# Patient Record
Sex: MALE | Race: WHITE | NOT HISPANIC OR LATINO | Employment: FULL TIME | URBAN - METROPOLITAN AREA
[De-identification: names, ages, dates, MRNs, and addresses within clinical notes are randomized per-mention and may not be internally consistent; named-entity substitution may affect disease eponyms.]

---

## 2017-04-20 DIAGNOSIS — M10.9 ACUTE GOUTY ARTHRITIS: ICD-10-CM

## 2017-04-20 NOTE — TELEPHONE ENCOUNTER
Pt is calling and states that he is having a gout flare up in his left foot and asking if he can get med's over the phone, he did set up appt for tomorrow with clifford, but looking to get something today, please triage.     Karen Cronin, Station

## 2017-04-20 NOTE — TELEPHONE ENCOUNTER
Patient would like medication today still .   Sairasting request to SAUMYA Delaney as patient is requesting another provider look at the request .     Once ordered please call patient on [phone] or with any other questions.     Marah Carmona [facility]

## 2017-04-20 NOTE — TELEPHONE ENCOUNTER
Routing refill request to provider for review/approval because:[employer]  Drug not on the [facility] refill protocol or controlled substance  GABRIEL Henson  Clinic  RN/Joel Canales

## 2017-04-21 ENCOUNTER — TELEPHONE (OUTPATIENT)
Dept: LAB | Facility: CLINIC | Age: 52
End: 2017-04-21

## 2017-04-21 DIAGNOSIS — M1A.9XX0 CHRONIC GOUT WITHOUT TOPHUS, UNSPECIFIED CAUSE, UNSPECIFIED SITE: Primary | ICD-10-CM

## 2017-04-21 RX ORDER — HYDROCODONE BITARTRATE AND ACETAMINOPHEN 5; 325 MG/1; MG/1
1 TABLET ORAL EVERY 6 HOURS PRN
Qty: 25 TABLET | Refills: 0 | Status: CANCELLED | OUTPATIENT
Start: 2017-04-21

## 2017-04-21 NOTE — TELEPHONE ENCOUNTER
Appointment with Dr. Urena canceled. Now has appointment with Dr. Stringer 4/26/17.    Rama Donato CMA

## 2017-04-21 NOTE — PROGRESS NOTES
SUBJECTIVE:     CC: Favio Jacinto is an 51 year old male who presents for preventative health visit.     Healthy Habits:    Do you get at least three servings of calcium containing foods daily (dairy, green leafy vegetables, etc.)? yes    Amount of exercise or daily activities, outside of work: 2 day(s) per week    Problems taking medications regularly not applicable    Medication side effects: No    Have you had an eye exam in the past two years? no    Do you see a dentist twice per year? yes    Do you have sleep apnea, excessive snoring or daytime drowsiness?no    - Brother passed away 2 weeks ago, he had liver cancer. I had seen his brother in clinic. Reviewed the course of his illness, discussed grieving and coping with loss.    - Check spot on lip. Patient chews tobacco. He notes a white spot on the mid lower lip, left side that comes and goes, typically monthly. He will scrape off the lesion, it turns dark, then seems to resolve. No bleeding.    - Gout flare up. History of gout. Reviewed preventative treatment of gout using allopurinol, this is been deferred by the patient in the past.    -  Requesting PSA drawn today.    -  Patient says that he has numbness on both feet on the outside toes. The patient thinks it is neuropathy. He has seen podiatry in the past.    - History of pulmonary nodules, last CT was done 3 years ago. Denies any hemoptysis, chronic cough.    - History of a bicuspid aortic valve, an incidental finding on echocardiogram. Denies any symptoms consistent with heart failure.    - I reviewed stress testing for ischemic heart disease. His CT angiographic showed no evidence of calcification, 0%. Stress echocardiogram showed no evidence of ischemic heart disease. Advised that these tests were excellent and or further tests chest stress test be done.          Today's PHQ-2 Score:   PHQ-2 ( 1999 Pfizer) 4/26/2017 10/31/2016   Q1: Little interest or pleasure in doing things 1 0   Q2: Feeling  down, depressed or hopeless 0 0   PHQ-2 Score 1 0       Abuse: Current or Past(Physical, Sexual or Emotional)- No  Do you feel safe in your environment - Yes    Social History   Substance Use Topics     Smoking status: Former Smoker     Types: Dip, chew, snus or snuff     Quit date: 1/1/2007     Smokeless tobacco: Former User     Types: Chew     Alcohol use Yes      Comment: occasional     The patient does not drink >3 drinks per day nor >7 drinks per week.    Last PSA: No results found for: PSA    Recent Labs   Lab Test  02/15/13   1108  04/13/10   0615   CHOL  222*  183   HDL  64  57   LDL  139*  96   TRIG  96  148   CHOLHDLRATIO  3.0  3.0       Reviewed orders with patient. Reviewed health maintenance and updated orders accordingly - Yes    Reviewed and updated as needed this visit by clinical staff  Tobacco  Allergies  Meds  Med Hx  Surg Hx  Fam Hx  Soc Hx        Reviewed and updated as needed this visit by Provider            ROS:  C: NEGATIVE for fever, chills, change in weight  I: NEGATIVE for worrisome rashes, moles or lesions  E: NEGATIVE for vision changes or irritation  ENT: NEGATIVE for ear, mouth and throat problems  R: NEGATIVE for significant cough or SOB  CV: NEGATIVE for chest pain, palpitations or peripheral edema  GI: NEGATIVE for nausea, abdominal pain, heartburn, or change in bowel habits   male: NEGATIVE for dysuria, hematuria, decreased urinary stream, erectile dysfunction, urethral discharge  M: POSITIVE for gout in bilateral feet. NEGATIVE for other significant arthralgias or myalgia  N: POSITIVE for lateral feet numbness. NEGATIVE for other weakness, dizziness or paresthesias  P: NEGATIVE for changes in mood or affect    This document serves as a record of the services and decisions personally performed and made by Leah Stringer MD. It was created on his behalf by Andrea Garcia, a trained medical scribe. The creation of this document is based the provider's statements to the  medical scribe.  Andrea Garcia 8:02 AM April 26, 2017    Problem list, Medication list, Allergies, and Medical/Social/Surgical histories reviewed in [facility] and updated as appropriate.  OBJECTIVE:     BP 90/70  Pulse 68  Temp 98.4  F (36.9  C) (Tympanic)  Ht 6' (1.829 m)  Wt 185 lb 4 oz (84 kg)  BMI 25.12 kg/m2     EXAM:  GENERAL: healthy, alert and no distress  EYES: Eyes grossly normal to inspection, conjunctivae and sclerae normal  HENT: ear canals and TM's normal, nose and mouth without ulcers or lesions  RESP: lungs clear to auscultation - no rales, rhonchi or wheezes  CV: regular rate and rhythm, normal S1 S2, no murmur  ABDOMEN: soft, nontender  MS: no gross musculoskeletal defects noted, no edema  NEURO: Normal strength and tone, mentation intact and speech normal  PSYCH: mentation appears normal, affect normal/bright      Results for orders placed or performed in visit on 04/26/17   Hemoglobin A1c   Result Value Ref Range    Hemoglobin A1C 5.2 4.3 - 6.0 %       ASSESSMENT/PLAN:       (Z00.00) Routine general medical examination at a health care facility  (primary encounter diagnosis)  Comment: Reviewed lifestyle, current conditions, medications, routine screenings, labs.  Plan: Annual physical in 1 year, sooner for other health maintenance.     (Z13.6) CARDIOVASCULAR SCREENING; LDL GOAL LESS THAN 160  Comment: Routine check. Lab results pending.   Plan: CANCELED: Lipid Profile with reflex to direct         LDL          (R73.09) Elevated glucose  Comment: A1c is 5.2 today. Patient is not on any medications.   Plan: Hemoglobin A1c            (Z12.5) Screening for prostate cancer  Comment: Per patient request, will screen today.  Plan: PSA, screen    (Z11.59) Need for hepatitis C screening test  Comment: Routine screen based off age.   Plan: **Hepatitis C Screen Reflex to RNA FUTURE         Anytime      (Q23.1) Bicuspid aortic valve  Comment: Discussed best way to manage bicuspid aortic valve. I told the  patient the best way would be to get ECG.   Plan: Echocardiogram Complete      (M1A.9XX0) Chronic gout without tophus, unspecified cause, unspecified site  Comment: Recent flare up. Will check uric acid levels today. Lab results pending. Discussed starting preventative daily medication. Will prescribe preventative medication and medication to treat current symptoms.   Plan: Uric Acid, allopurinol (ZYLOPRIM) 300 MG tablet,         indomethacin (INDOCIN) 25 MG capsule    (M79.671,  M79.672) Pain in both feet  Comment: Likely secondary to gout. Patient is also experiencing numbness in feet. Refer to podiatry.   Plan: PODIATRY/FOOT & ANKLE SURGERY REFERRAL            (R91.8) Pulmonary nodules  Comment: Repeat CT to ensure stability.  Plan: CT Chest w/o Contrast       Await test results     Addendum: CT negative for any change in pulmonary nodules. No further imaging needed.    Patient Instructions     Preventive Health Recommendations  Male Ages 50 - 64     *    Will check cholesterol, kidney function, PSA, uric acid for gout, blood sugar.     *    For gout, consider taking a daily medication to prevent gout: allopurinol. I'll send a prescription.     *    For the foot pain and numbness, see Dr. Hutson. [phone].    *    Call about setting up a echocardiogram. [phone].     *     For the spot on your lip, when it occurs, come in for biopsy.     *      For the chest, call about setting up a repeat CT scan: [phone].     *     Your heart scan from 2014 was perfect, no signs of blockage.         COUNSELING:  Reviewed preventive health counseling, as reflected in patient instructions       Regular exercise       Healthy diet/nutrition       Consider Hep C screening for patients born between 1945 and 1965       Prostate cancer screening         reports that he quit smoking about 10 years ago. His smoking use included Dip, chew, snus or snuff. He has quit using smokeless tobacco. His smokeless tobacco use  included Chew.  Tobacco Cessation Action Plan: Self help information given to patient  Estimated body mass index is 25.12 kg/(m^2) as calculated from the following:    Height as of this encounter: 6' (1.829 m).    Weight as of this encounter: 185 lb 4 oz (84 kg).       Counseling Resources:  ATP IV Guidelines  Pooled Cohorts Equation Calculator  FRAX Risk Assessment  ICSI Preventive Guidelines  Dietary Guidelines for Americans, 2010  USDA's MyPlate  ASA Prophylaxis  Lung CA Screening    The information in this document, created by a scribe for me, accurately reflects the services I personally performed and the decisions made by me. I have reviewed and approved this document for accuracy. 8:01 AM 4/26/2017    Leah Stringer MD  [facility]

## 2017-04-21 NOTE — PATIENT INSTRUCTIONS
Preventive Health Recommendations  Male Ages 50   64     *    Will check cholesterol, kidney function, PSA, uric acid for gout, blood sugar.     *    For gout, consider taking a daily medication to prevent gout: allopurinol. I'll send a prescription.     *    For the foot pain and numbness, see Dr. Hutson. [phone].    *    Call about setting up a echocardiogram. [phone].     *     For the spot on your lip, when it occurs, come in for biopsy.     *      For the chest, call about setting up a repeat CT scan: [phone].     *     Your heart scan from 2014 was perfect, no signs of blockage.       Yearly exam:             See your health care provider every year in order to  o   Review health changes.   o   Discuss preventive care.    o   Review your medicines if your doctor has prescribed any.     Have a cholesterol test every 5 years, or more frequently if you are at risk for high cholesterol/heart disease.     Have a diabetes test (fasting glucose) every three years. If you are at risk for diabetes, you should have this test more often.     Have a colonoscopy at age 50, or have a yearly FIT test (stool test). These exams will check for colon cancer.      Talk with your health care provider about whether or not a prostate cancer screening test (PSA) is right for you.    You should be tested each year for STDs (sexually transmitted diseases), if you re at risk.     Shots: Get a flu shot each year. Get a tetanus shot every 10 years.     Nutrition:    Eat at least 5 servings of fruits and vegetables daily.     Eat whole-grain bread, whole-wheat pasta and brown rice instead of white grains and rice.     Talk to your provider about Calcium and Vitamin D.     Lifestyle    Exercise for at least 150 minutes a week (30 minutes a day, 5 days a week). This will help you control your weight and prevent disease.     Limit alcohol to one drink per day.     No smoking.     Wear sunscreen to prevent skin cancer.      See your dentist every six months for an exam and cleaning.     See your eye doctor every 1 to 2 years.

## 2017-04-21 NOTE — TELEPHONE ENCOUNTER
Patient coming in for lab work on Wednesday, 04/26/17.         Patient will be fasting.             Please place future orders. Thanks

## 2017-04-21 NOTE — TELEPHONE ENCOUNTER
Spoke with patient regarding medication requests.  Patient states that steroids do not work for him and he would like the indomethacin and norco combination that Dr. Stringer always prescribes for him. He gets a gout flare once every year or once every 2 years and this is the only thing that works for him.  Patient would like to keep appointment as scheduled for 1:40 today with Dr. Urena.    Cece Martinez RN

## 2017-04-26 ENCOUNTER — OFFICE VISIT (OUTPATIENT)
Dept: FAMILY MEDICINE | Facility: CLINIC | Age: 52
End: 2017-04-26
Payer: COMMERCIAL

## 2017-04-26 VITALS
HEART RATE: 68 BPM | TEMPERATURE: 98.4 F | HEIGHT: 72 IN | DIASTOLIC BLOOD PRESSURE: 70 MMHG | SYSTOLIC BLOOD PRESSURE: 90 MMHG | WEIGHT: 185.25 LBS | BODY MASS INDEX: 25.09 KG/M2

## 2017-04-26 DIAGNOSIS — M79.671 PAIN IN BOTH FEET: ICD-10-CM

## 2017-04-26 DIAGNOSIS — M1A.9XX0 CHRONIC GOUT WITHOUT TOPHUS, UNSPECIFIED CAUSE, UNSPECIFIED SITE: ICD-10-CM

## 2017-04-26 DIAGNOSIS — Z13.6 CARDIOVASCULAR SCREENING; LDL GOAL LESS THAN 160: ICD-10-CM

## 2017-04-26 DIAGNOSIS — M79.672 PAIN IN BOTH FEET: ICD-10-CM

## 2017-04-26 DIAGNOSIS — Z11.59 NEED FOR HEPATITIS C SCREENING TEST: ICD-10-CM

## 2017-04-26 DIAGNOSIS — Z72.0 TOBACCO ABUSE: ICD-10-CM

## 2017-04-26 DIAGNOSIS — R91.8 PULMONARY NODULES: ICD-10-CM

## 2017-04-26 DIAGNOSIS — Z00.00 ROUTINE GENERAL MEDICAL EXAMINATION AT A HEALTH CARE FACILITY: Primary | ICD-10-CM

## 2017-04-26 DIAGNOSIS — Q23.81 BICUSPID AORTIC VALVE: ICD-10-CM

## 2017-04-26 DIAGNOSIS — K13.0 LIP LESION: ICD-10-CM

## 2017-04-26 DIAGNOSIS — R73.09 ELEVATED GLUCOSE: ICD-10-CM

## 2017-04-26 DIAGNOSIS — Z12.5 SCREENING FOR PROSTATE CANCER: ICD-10-CM

## 2017-04-26 LAB
ANION GAP SERPL CALCULATED.3IONS-SCNC: 7 MMOL/L (ref 3–14)
BUN SERPL-MCNC: 16 MG/DL (ref 7–30)
CALCIUM SERPL-MCNC: 8.6 MG/DL (ref 8.5–10.1)
CHLORIDE SERPL-SCNC: 105 MMOL/L (ref 94–109)
CHOLEST SERPL-MCNC: 204 MG/DL
CO2 SERPL-SCNC: 27 MMOL/L (ref 20–32)
CREAT SERPL-MCNC: 1.14 MG/DL (ref 0.66–1.25)
GFR SERPL CREATININE-BSD FRML MDRD: 67 ML/MIN/1.7M2
GLUCOSE SERPL-MCNC: 93 MG/DL (ref 70–99)
HBA1C MFR BLD: 5.2 % (ref 4.3–6)
HDLC SERPL-MCNC: 64 MG/DL
LDLC SERPL CALC-MCNC: 102 MG/DL
NONHDLC SERPL-MCNC: 140 MG/DL
POTASSIUM SERPL-SCNC: 4.1 MMOL/L (ref 3.4–5.3)
PSA SERPL-ACNC: 0.77 UG/L (ref 0–4)
SODIUM SERPL-SCNC: 139 MMOL/L (ref 133–144)
TRIGL SERPL-MCNC: 189 MG/DL
URATE SERPL-MCNC: 8.3 MG/DL (ref 3.5–7.2)

## 2017-04-26 PROCEDURE — 36415 COLL VENOUS BLD VENIPUNCTURE: CPT | Performed by: FAMILY MEDICINE

## 2017-04-26 PROCEDURE — 83036 HEMOGLOBIN GLYCOSYLATED A1C: CPT | Performed by: FAMILY MEDICINE

## 2017-04-26 PROCEDURE — G0103 PSA SCREENING: HCPCS | Performed by: FAMILY MEDICINE

## 2017-04-26 PROCEDURE — 80048 BASIC METABOLIC PNL TOTAL CA: CPT | Performed by: FAMILY MEDICINE

## 2017-04-26 PROCEDURE — 84550 ASSAY OF BLOOD/URIC ACID: CPT | Performed by: FAMILY MEDICINE

## 2017-04-26 PROCEDURE — 80061 LIPID PANEL: CPT | Performed by: FAMILY MEDICINE

## 2017-04-26 PROCEDURE — 99396 PREV VISIT EST AGE 40-64: CPT | Performed by: FAMILY MEDICINE

## 2017-04-26 PROCEDURE — 99213 OFFICE O/P EST LOW 20 MIN: CPT | Mod: 25 | Performed by: FAMILY MEDICINE

## 2017-04-26 RX ORDER — INDOMETHACIN 25 MG/1
25 CAPSULE ORAL 2 TIMES DAILY WITH MEALS
Qty: 42 CAPSULE | Refills: 1 | Status: SHIPPED | OUTPATIENT
Start: 2017-04-26 | End: 2017-06-07

## 2017-04-26 RX ORDER — ALLOPURINOL 300 MG/1
300 TABLET ORAL DAILY
Qty: 90 TABLET | Refills: 3 | Status: SHIPPED | OUTPATIENT
Start: 2017-04-26 | End: 2018-05-08

## 2017-04-26 NOTE — NURSING NOTE
Chief Complaint   Patient presents with     Physical       Initial BP 90/70  Pulse 68  Temp 98.4  F (36.9  C) (Tympanic)  Ht 6' (1.829 m)  Wt 185 lb 4 oz (84 kg)  BMI 25.12 kg/m2 Estimated body mass index is 25.12 kg/(m^2) as calculated from the following:    Height as of this encounter: 6' (1.829 m).    Weight as of this encounter: 185 lb 4 oz (84 kg).  Medication Reconciliation: complete

## 2017-04-26 NOTE — MR AVS SNAPSHOT
After Visit Summary   4/26/2017    Favio Jacinto    MRN: [id number]           Patient Information     Date Of Birth          1965        Visit Information        Provider Department      4/26/2017 7:40 AM Leah tSringer MD [facility]        Today's Diagnoses     Routine general medical examination at a health care facility    -  1    CARDIOVASCULAR SCREENING; LDL GOAL LESS THAN 160        Elevated glucose        Screening for prostate cancer        Need for hepatitis C screening test        Bicuspid aortic valve        Chronic gout without tophus, unspecified cause, unspecified site        Pain in both feet        Pulmonary nodules          Care Instructions      Preventive Health Recommendations  Male Ages 50 - 64     *    Will check cholesterol, kidney function, PSA, uric acid for gout, blood sugar.     *    For gout, consider taking a daily medication to prevent gout: allopurinol. I'll send a prescription.     *    For the foot pain and numbness, see Dr. Hutson. [phone].    *    Call about setting up a echocardiogram. [phone].     *     For the spot on your lip, when it occurs, come in for biopsy.     *      For the chest, call about setting up a repeat CT scan: [phone].     *     Your heart scan from 2014 was perfect, no signs of blockage.       Yearly exam:             See your health care provider every year in order to  o   Review health changes.   o   Discuss preventive care.    o   Review your medicines if your doctor has prescribed any.     Have a cholesterol test every 5 years, or more frequently if you are at risk for high cholesterol/heart disease.     Have a diabetes test (fasting glucose) every three years. If you are at risk for diabetes, you should have this test more often.     Have a colonoscopy at age 50, or have a yearly FIT test (stool test). These exams will check for colon cancer.      Talk with your health care provider about  whether or not a prostate cancer screening test (PSA) is right for you.    You should be tested each year for STDs (sexually transmitted diseases), if you re at risk.     Shots: Get a flu shot each year. Get a tetanus shot every 10 years.     Nutrition:    Eat at least 5 servings of fruits and vegetables daily.     Eat whole-grain bread, whole-wheat pasta and brown rice instead of white grains and rice.     Talk to your provider about Calcium and Vitamin D.     Lifestyle    Exercise for at least 150 minutes a week (30 minutes a day, 5 days a week). This will help you control your weight and prevent disease.     Limit alcohol to one drink per day.     No smoking.     Wear sunscreen to prevent skin cancer.     See your dentist every six months for an exam and cleaning.     See your eye doctor every 1 to 2 years.          Follow-ups after your visit        Additional Services     PODIATRY/FOOT & ANKLE SURGERY REFERRAL       Your provider has referred you to: [facility]: [facility] [phone]   [email or web address]    Please be aware that coverage of these services is subject to the terms and limitations of your health insurance plan.  Call member services at your health plan with any benefit or coverage questions.      Please bring the following to your appointment:  >>   Any x-rays, CTs or MRIs which have been performed.  Contact the facility where they were done to arrange for  prior to your scheduled appointment.    >>   List of current medications   >>   This referral request   >>   Any documents/labs given to you for this referral                  Future tests that were ordered for you today     Open Future Orders        Priority Expected Expires Ordered    CT Chest w/o Contrast Routine  4/26/2018 4/26/2017    Echocardiogram Complete Routine  4/26/2018 4/26/2017    **Hepatitis C Screen Reflex to RNA FUTURE anytime Routine 4/26/2017 4/26/2018 4/26/2017             Who to contact     Normal or non-critical lab and imaging results will be communicated to you by [employer], letter or phone within 4 business days after the clinic has received the results. If you do not hear from us within 7 days, please contact the clinic through [employer] or phone. If you have a critical or abnormal lab result, we will notify you by phone as soon as possible.  Submit refill requests through [facility] or call your pharmacy and they will forward the refill request to us. Please allow 3 business days for your refill to be completed.          If you need to speak with a  for additional information , please call: [phone]           Additional Information About Your Visit        [facility] Information     [facility] gives you secure access to your electronic health record. If you see a primary care provider, you can also send messages to your care team and make appointments. If you have questions, please call your primary care clinic.  If you do not have a primary care provider, please call [phone] and they will assist you.        Care EveryWhere ID     This is your Care EveryWhere ID. This could be used by other organizations to access your [facility] medical records  [id number]        Your Vitals Were     Pulse Temperature Height BMI (Body Mass Index)          68 98.4  F (36.9  C) (Tympanic) 6' (1.829 m) 25.12 kg/m2         Blood Pressure from Last 3 Encounters:   04/26/17 90/70   11/07/16 118/77   10/31/16 110/70    Weight from Last 3 Encounters:   04/26/17 185 lb 4 oz (84 kg)   11/07/16 170 lb (77.1 kg)   10/31/16 185 lb (83.9 kg)              We Performed the Following     Hemoglobin A1c     PODIATRY/FOOT & ANKLE SURGERY REFERRAL     PSA, screen          Today's Medication Changes          These changes are accurate as of: 4/26/17  8:30 AM.  If you have any questions, ask your nurse or doctor.               Start taking these medicines.        Dose/Directions    allopurinol 300 MG  tablet   Commonly known as:  ZYLOPRIM   Used for:  Chronic gout without tophus, unspecified cause, unspecified site   Started by:  Leah Stringer MD        Dose:  300 mg   Take 1 tablet (300 mg) by mouth daily   Quantity:  90 tablet   Refills:  3       indomethacin 25 MG capsule   Commonly known as:  INDOCIN   Used for:  Chronic gout without tophus, unspecified cause, unspecified site   Started by:  Leah Stringer MD        Dose:  25 mg   Take 1 capsule (25 mg) by mouth 2 times daily (with meals)   Quantity:  42 capsule   Refills:  1            Where to get your medicines      These medications were sent to [facility] [address]  [address] [address]     Phone:  [phone]     allopurinol 300 MG tablet    indomethacin 25 MG capsule                Primary Care Provider Office Phone # Fax #    Leah Stringer [phone]       [facility] [address] [address]        Thank you!     Thank you for choosing [facility]  for your care. Our goal is always to provide you with excellent care. Hearing back from our patients is one way we can continue to improve our services. Please take a few minutes to complete the written survey that you may receive in the mail after your visit with us. Thank you!             Your Updated Medication List - Protect others around you: Learn how to safely use, store and throw away your medicines at www.disposemymeds.org.          This list is accurate as of: 4/26/17  8:30 AM.  Always use your most recent med list.                   Brand Name Dispense Instructions for use    allopurinol 300 MG tablet    ZYLOPRIM    90 tablet    Take 1 tablet (300 mg) by mouth daily       fluticasone 50 MCG/ACT spray    FLONASE    16 g    Spray 1-2 sprays into both nostrils daily       indomethacin 25 MG capsule    INDOCIN    42 capsule    Take 1 capsule (25 mg) by mouth 2  times daily (with meals)

## 2017-05-01 ENCOUNTER — HOSPITAL ENCOUNTER (OUTPATIENT)
Dept: CT IMAGING | Facility: CLINIC | Age: 52
Discharge: HOME | End: 2017-05-01
Attending: FAMILY MEDICINE | Admitting: FAMILY MEDICINE
Payer: COMMERCIAL

## 2017-05-01 DIAGNOSIS — R91.8 PULMONARY NODULES: ICD-10-CM

## 2017-05-01 PROCEDURE — 71250 CT THORAX DX C-: CPT

## 2017-05-08 ENCOUNTER — HOSPITAL ENCOUNTER (OUTPATIENT)
Dept: CARDIOLOGY | Facility: CLINIC | Age: 52
Discharge: HOME | End: 2017-05-08
Attending: FAMILY MEDICINE | Admitting: FAMILY MEDICINE
Payer: COMMERCIAL

## 2017-05-08 DIAGNOSIS — Q23.81 BICUSPID AORTIC VALVE: ICD-10-CM

## 2017-05-08 PROCEDURE — 93306 TTE W/DOPPLER COMPLETE: CPT

## 2017-05-08 PROCEDURE — 93306 TTE W/DOPPLER COMPLETE: CPT | Mod: 26 | Performed by: INTERNAL MEDICINE

## 2017-05-12 ENCOUNTER — OFFICE VISIT (OUTPATIENT)
Dept: FAMILY MEDICINE | Facility: CLINIC | Age: 52
End: 2017-05-12
Payer: COMMERCIAL

## 2017-05-12 VITALS
WEIGHT: 182.25 LBS | DIASTOLIC BLOOD PRESSURE: 74 MMHG | SYSTOLIC BLOOD PRESSURE: 112 MMHG | HEART RATE: 80 BPM | HEIGHT: 72 IN | BODY MASS INDEX: 24.69 KG/M2

## 2017-05-12 DIAGNOSIS — Q23.81 BICUSPID AORTIC VALVE: Primary | ICD-10-CM

## 2017-05-12 DIAGNOSIS — L98.9 SKIN LESION: ICD-10-CM

## 2017-05-12 DIAGNOSIS — M1A.9XX0 CHRONIC GOUT WITHOUT TOPHUS, UNSPECIFIED CAUSE, UNSPECIFIED SITE: ICD-10-CM

## 2017-05-12 DIAGNOSIS — R91.8 PULMONARY NODULES: ICD-10-CM

## 2017-05-12 PROCEDURE — 17000 DESTRUCT PREMALG LESION: CPT | Performed by: FAMILY MEDICINE

## 2017-05-12 PROCEDURE — 99214 OFFICE O/P EST MOD 30 MIN: CPT | Mod: 25 | Performed by: FAMILY MEDICINE

## 2017-05-12 RX ORDER — COLCHICINE 0.6 MG/1
0.6 TABLET ORAL 2 TIMES DAILY
Qty: 60 TABLET | Refills: 3 | Status: SHIPPED | OUTPATIENT
Start: 2017-05-12 | End: 2018-05-08

## 2017-05-12 NOTE — PROGRESS NOTES
SUBJECTIVE:                                                    Favio Jacinto is a 51 year old male who presents to clinic today for the following health issues:      - Patient has returned to have removal of spot on lower left side of lip (see encounter from 4/26/2017). Spot returns q 3 weeks or so.     - Reviewed chronic gout. Started allopurinol and indomethacin 1-2 weeks ago. Continues to be symptomatic.    - Review results from echocardiogram completed 5/8/2017 due to history of bicuspid aortic valve (incidental finding on previous echocardiogram) and possible aortic stenosis. Sister-in-law found she had AAA, patient wondering if he should be concerned about his risk for AAA.    Echocardiogram (5/8/2017) - Interpretation Summary     Left ventricular systolic function is normal.  The visual ejection fraction is estimated at 65-70%.  The mean AoV pressure gradient is 25mmHg.  Mild to moderate valvular aortic stenosis.  The ascending aorta is Mildly dilated.  Compared to 2014, no significant change. The discrepancy in TRISH between the  two studies is related to underestimation of the LVOT diameter on the prior  study.  Suggest fu echo in 1 year to reassess aorrtic stenosis if clinically  appropriate.  A cardiology consult would be appropriate. The study was technically adequate.    Interpretation per Leonardo Valentine MD    - Reviewed results from chest CT completed 5/1/2017 due to history of pulmonary nodules. Per Alexi Tobias MD, chest CT shows no significant change from CT on 10/23/2014.      Social History: Former smoker (quit 2007). Current user of chewing tobacco.        Reviewed and updated as needed this visit by clinical staff  Tobacco  Allergies  Meds  Med Hx  Surg Hx  Fam Hx  Soc Hx      Reviewed and updated as needed this visit by Provider         ROS:  C: NEGATIVE for fever, chills, change in weight  INTEGUMENTARY/SKIN: POSITIVE for skin lesion on lip  R: NEGATIVE for significant cough or SOB  CV:  NEGATIVE for chest pain, palpitations or peripheral edema  MS: POSITIVE for gout      This document serves as a record of the services and decisions personally performed and made by Leah Stringer MD. It was created on his behalf by Alycia Baca, a trained medical scribe. The creation of this document is based the provider's statements to the medical scribe.  Alycia Baca 3:32 PM May 12, 2017    OBJECTIVE:                                                    /74  Pulse 80  Ht 6' (1.829 m)  Wt 182 lb 4 oz (82.7 kg)  BMI 24.72 kg/m2  Body mass index is 24.72 kg/(m^2).     GENERAL: healthy, alert and no distress  RESP: lungs clear to auscultation - no rales, rhonchi or wheezes  CV: regular rate and rhythm, normal S1 S2, no murmur  SKIN: there is a 2 mm circular area of crusty erythema on the lateral left lower lip    Procedure Note: LN2 x 3 was applied to the above lesion without immediate complications.       ASSESSMENT/PLAN:                                                      (Q23.1) Bicuspid aortic valve  (primary encounter diagnosis)  Comment: Incidental finding on previous echocardiogram. Follow-up echo shows mild aortic stenosis, but was otherwise normal.  Plan: Follow-up echo in 1 year. May consider AAA screening at that time. No cardiology referral at this time. Monitor closely for high blood pressure.    (M1A.9XX0) Chronic gout without tophus, unspecified cause, unspecified site  Comment: Continues to be symptomatic on Indomethacin. Recently started preventative allopurinol.  Plan: colchicine (COLCRYS) 0.6 MG tablet - Will discontinue Indomethacin and start Colchicine to manage gout before allopurinol starts working. Reviewed potential side effects, including diarrhea. Continue daily Allopurinol.    (R91.8) Pulmonary nodules  Comment: Recent CT showed no change from 2014.  Plan: No further imaging needed.    (L98.9) Skin lesion  Comment: See procedure note. Patient tolerated procedure well.  Plan: If  lesion reoccurs, advise biopsy.        Patient will follow up in 12 months or sooner, PRN. Patient instructed to call with any questions or concerns.      Patient Instructions   *   The CT scan of your lungs is okay. No change in the nodules, no cancer.     *    The echocardigram shows mild aortic stenosis from the bicuspid valve. Nothing to worry about, but repeat echocardiogram in one year.     *    For the gout, try another medication until the pain goes away: colchicine. Stay on the preventative allopurinol.     *    Will freeze the spot on your lip. If it comes back, will need to biopsy it.     The information in this document, created by a scribe for me, accurately reflects the services I personally performed and the decisions made by me. I have reviewed and approved this document for accuracy.  3:45 PM May 12, 2017    Leah Stringer MD  [facility]

## 2017-05-12 NOTE — NURSING NOTE
No chief complaint on file.      Initial /74  Pulse 80  Ht 6' (1.829 m)  Wt 182 lb 4 oz (82.7 kg)  BMI 24.72 kg/m2 Estimated body mass index is 24.72 kg/(m^2) as calculated from the following:    Height as of this encounter: 6' (1.829 m).    Weight as of this encounter: 182 lb 4 oz (82.7 kg).  Medication Reconciliation: complete

## 2017-05-12 NOTE — MR AVS SNAPSHOT
After Visit Summary   5/12/2017    Favio Jacinto    MRN: [id number]           Patient Information     Date Of Birth          1965        Visit Information        Provider Department      5/12/2017 3:20 PM Leah Stringer MD [facility]        Today's Diagnoses     Bicuspid aortic valve    -  1    Chronic gout without tophus, unspecified cause, unspecified site        Pulmonary nodules        Skin lesion          Care Instructions    *   The CT scan of your lungs is okay. No change in the nodules, no cancer.     *    The echocardigram shows mild aortic stenosis from the bicuspid valve. Nothing to worry about, but repeat echocardiogram in one year.     *    For the gout, try another medication until the pain goes away: colchicine. Stay on the preventative allopurinol.     *    Will freeze the spot on your lip. If it comes back, will need to biopsy it.         Follow-ups after your visit        Who to contact     Normal or non-critical lab and imaging results will be communicated to you by [employer], letter or phone within 4 business days after the clinic has received the results. If you do not hear from us within 7 days, please contact the clinic through [employer] or phone. If you have a critical or abnormal lab result, we will notify you by phone as soon as possible.  Submit refill requests through [employer] or call your pharmacy and they will forward the refill request to us. Please allow 3 business days for your refill to be completed.          If you need to speak with a  for additional information , please call: [phone]           Additional Information About Your Visit        [employer] Information     [employer] gives you secure access to your electronic health record. If you see a primary care provider, you can also send messages to your care team and make appointments. If you have questions, please call your primary care clinic.  If you do not have a primary  care provider, please call [phone] and they will assist you.        Care EveryWhere ID     This is your Care EveryWhere ID. This could be used by other organizations to access your [place] medical records  [id number]        Your Vitals Were     Pulse Height BMI (Body Mass Index)             80 6' (1.829 m) 24.72 kg/m2          Blood Pressure from Last 3 Encounters:   05/12/17 112/74   04/26/17 90/70   11/07/16 118/77    Weight from Last 3 Encounters:   05/12/17 182 lb 4 oz (82.7 kg)   04/26/17 185 lb 4 oz (84 kg)   11/07/16 170 lb (77.1 kg)              We Performed the Following     DESTRUCT PREMALIGNANT LESION, FIRST          Today's Medication Changes          These changes are accurate as of: 5/12/17 11:59 PM.  If you have any questions, ask your nurse or doctor.               Start taking these medicines.        Dose/Directions    colchicine 0.6 MG tablet   Commonly known as:  COLCRYS   Used for:  Chronic gout without tophus, unspecified cause, unspecified site   Started by:  Leah Stringer MD        Dose:  0.6 mg   Take 1 tablet (0.6 mg) by mouth 2 times daily   Quantity:  60 tablet   Refills:  3            Where to get your medicines      These medications were sent to [facility] [address] [address]     Phone:  [phone]     colchicine 0.6 MG tablet                Primary Care Provider Office Phone # Fax #    Leah Stringer [phone]       [address] [address]        Thank you!     Thank you for choosing [facility]  for your care. Our goal is always to provide you with excellent care. Hearing back from our patients is one way we can continue to improve our services. Please take a few minutes to complete the written survey that you may receive in the mail after your visit with us. Thank you!             Your Updated Medication List - Protect  others around you: Learn how to safely use, store and throw away your medicines at www.disposemymeds.org.          This list is accurate as of: 5/12/17 11:59 PM.  Always use your most recent med list.                   Brand Name Dispense Instructions for use    allopurinol 300 MG tablet    ZYLOPRIM    90 tablet    Take 1 tablet (300 mg) by mouth daily       colchicine 0.6 MG tablet    COLCRYS    60 tablet    Take 1 tablet (0.6 mg) by mouth 2 times daily       fluticasone 50 MCG/ACT spray    FLONASE    16 g    Spray 1-2 sprays into both nostrils daily       indomethacin 25 MG capsule    INDOCIN    42 capsule    Take 1 capsule (25 mg) by mouth 2 times daily (with meals)

## 2017-05-12 NOTE — PATIENT INSTRUCTIONS
*   The CT scan of your lungs is okay. No change in the nodules, no cancer.     *    The echocardigram shows mild aortic stenosis from the bicuspid valve. Nothing to worry about, but repeat echocardiogram in one year.     *    For the gout, try another medication until the pain goes away: colchicine. Stay on the preventative allopurinol.     *    Will freeze the spot on your lip. If it comes back, will need to biopsy it.

## 2017-06-07 ENCOUNTER — TELEPHONE (OUTPATIENT)
Dept: FAMILY MEDICINE | Facility: CLINIC | Age: 52
End: 2017-06-07

## 2017-06-07 DIAGNOSIS — M10.9 ACUTE GOUTY ARTHRITIS: ICD-10-CM

## 2017-06-07 RX ORDER — HYDROCODONE BITARTRATE AND ACETAMINOPHEN 5; 325 MG/1; MG/1
1 TABLET ORAL EVERY 6 HOURS PRN
Qty: 20 TABLET | Refills: 0 | Status: SHIPPED | OUTPATIENT
Start: 2017-06-07 | End: 2017-07-13

## 2017-06-07 RX ORDER — PREDNISONE 20 MG/1
40 TABLET ORAL DAILY
Qty: 10 TABLET | Refills: 0 | Status: SHIPPED | OUTPATIENT
Start: 2017-06-07 | End: 2017-08-09

## 2017-06-07 NOTE — TELEPHONE ENCOUNTER
Patient called and would like something for the pain that he is having from the gout and wants it sent to the [facility] today if possible.    Lety Martini, [place]

## 2017-06-07 NOTE — TELEPHONE ENCOUNTER
Will send an rx for prednisone. This should help within 24 hours. Please update patient. Thank you.  I also refilled the pain medications.

## 2017-07-13 ENCOUNTER — OFFICE VISIT (OUTPATIENT)
Dept: PODIATRY | Facility: CLINIC | Age: 52
End: 2017-07-13
Payer: COMMERCIAL

## 2017-07-13 VITALS — HEIGHT: 72 IN | BODY MASS INDEX: 24.65 KG/M2 | WEIGHT: 182 LBS

## 2017-07-13 DIAGNOSIS — M54.10 RADICULOPATHY WITH LOWER EXTREMITY SYMPTOMS: Primary | ICD-10-CM

## 2017-07-13 PROCEDURE — 99213 OFFICE O/P EST LOW 20 MIN: CPT | Performed by: PODIATRIST

## 2017-07-13 NOTE — PROGRESS NOTES
PATIENT HISTORY:  Favio Jacinto is a 51 year old male who presents to clinic for a painful left foot .  The patient describes the pain as pins and needles with burning.  The patient relates pain is located along the lateral aspect of the left foot and ankle.  The patient relates the pain has been present for the past several weeks.  The patient relates pain with ambulation.  The patient has tried soaking with little relief.         REVIEW OF SYSTEMS:  Constitutional, HEENT, cardiovascular, pulmonary, GI, , musculoskeletal, neuro, skin, endocrine and psych systems are negative, except as otherwise noted.     PAST MEDICAL HISTORY:   Past Medical History:   Diagnosis Date     Sinusitis         PAST SURGICAL HISTORY:   Past Surgical History:   Procedure Laterality Date     HC EXC HIP PELVIS LES SC 3 CM/>       HERNIA REPAIR, INGUINAL RT/LT          MEDICATIONS:   Current Outpatient Prescriptions:      colchicine (COLCRYS) 0.6 MG tablet, Take 1 tablet (0.6 mg) by mouth 2 times daily, Disp: 60 tablet, Rfl: 3     allopurinol (ZYLOPRIM) 300 MG tablet, Take 1 tablet (300 mg) by mouth daily, Disp: 90 tablet, Rfl: 3     fluticasone (FLONASE) 50 MCG/ACT nasal spray, Spray 1-2 sprays into both nostrils daily, Disp: 16 g, Rfl: 1  No current facility-administered medications for this visit.     Facility-Administered Medications Ordered in Other Visits:      sodium chloride (PF) 0.9% PF flush 5-10 mL, 5-10 mL, Intravenous, Q5 Min PRN, Leonardo Valentine MD     sodium chloride bacteriostatic 0.9 % flush 0-1 mL, 0-1 mL, Intradermal, Once PRN, Leonardo Valentine MD     sodium chloride 0.9 % BOLUS 100 mL, 100 mL, Intravenous, Q5 Min PRN, Leonardo Valentine MD, 100 mL at 07/08/14 1006     iopamidol (ISOVUE-370) 76% solution 5-200 mL, 5-200 mL, Intravenous, Q5 Min PRN, Leonardo Valentine MD, 105 mL at 07/08/14 1005     metoprolol (LOPRESSOR) tablet  mg,  mg, Oral, Once PRN, Leonardo Valentine MD      metoprolol (LOPRESSOR) injection 5-15 mg, 5-15 mg, Intravenous, Q3 Min PRN, Leonardo Valentine MD, 10 mg at 14 0949     lidocaine (cardiac) (XYLOCAINE) injection 100 mg, 100 mg, Intravenous, Once PRN, Leonardo Valentine MD     nitroglycerin (NITROSTAT) SL tablet 0.4 mg, 0.4 mg, Sublingual, Q15 Min PRN, Leonardo Valentine MD, 0.4 mg at 14 0950     ALLERGIES:    Allergies   Allergen Reactions     Penicillins         SOCIAL HISTORY:   Social History     Social History     Marital status: Single     Spouse name: N/A     Number of children: N/A     Years of education: N/A     Occupational History     Not on file.     Social History Main Topics     Smoking status: Former Smoker     Types: Dip, chew, snus or snuff     Quit date: 2007     Smokeless tobacco: Former User     Types: Chew     Alcohol use Yes      Comment: occasional     Drug use: No     Sexual activity: Not Currently     Other Topics Concern     Not on file     Social History Narrative        FAMILY HISTORY:   Family History   Problem Relation Age of Onset     CANCER Father       at 60, smoker lung     CANCER Brother      testicular cancer, doing well. Skin cancer     Other Cancer Brother      Liver and lung     Respiratory Mother      asthma-uses Advair        EXAM:Vitals: Ht 1.829 m (6')  Wt 82.6 kg (182 lb)  BMI 24.68 kg/m2  BMI= Body mass index is 24.68 kg/(m^2).          General appearance: Patient is alert and fully cooperative with history & exam.  No sign of distress is noted during the visit.     Psychiatric: Affect is pleasant & appropriate.  Patient appears motivated to improve health.     Respiratory: Breathing is regular & unlabored while sitting.     HEENT: Hearing is intact to spoken word.  Speech is clear.  No gross evidence of visual impairment that would impact ambulation.     Dermatologic: Skin is intact to both lower extremities without significant lesions, rash or abrasion.  No paronychia or evidence of soft  tissue infection is noted.     Vascular: DP & PT pulses are intact & regular bilaterally.  No significant edema or varicosities noted.  CFT and skin temperature is normal to both lower extremities.     Neurologic: Lower extremity sensation is intact to light touch.  No evidence of weakness or contracture in the lower extremities.  No evidence of neuropathy.     Musculoskeletal: Patient is ambulatory without assistive device or brace.  No gross ankle deformity noted.  No foot or ankle joint effusion is noted.  Positive straight leg raise test on the left lower extremity.         ASSESSMENT / PLAN:   ICD-10-CM    1. Radiculopathy with lower extremity symptoms M54.10 SPORTS MEDICINE REFERRAL       I have explained to Favio  about the conditions.  We discussed the nature of the condition as well as the treatment plan and expected length of recovery.  At this time, the patient was referred to [facility] for further evaluation of lower lumbar radiculopathy.      Disclaimer: This note consists of symbols derived from keyboarding, dictation and/or voice recognition software. As a result, there may be errors in the script that have gone undetected. Please consider this when interpreting information found in this chart.       DAY Hutson D.P.M., FLILLI.F.A.S.

## 2017-07-13 NOTE — PATIENT INSTRUCTIONS
GOUT  Gout is a disorder that results from the build-up of uric acid in the tissues or a joint. It most often affects the joint of the big toe.    CAUSES  Gout attacks are caused by deposits of crystallized uric acid in the joint. Uric acid is present in the blood and eliminated in the urine, but in people who have gout, uric acid accumulates and crystallizes in the joints. Uric acid is the result of the breakdown of purines, chemicals that are found naturally in our bodies and in food. Some people develop gout because their kidneys have difficulty eliminating normal amounts of uric acid, while others produce too much uric acid.  Gout occurs most commonly in the big toe because uric acid is sensitive to temperature changes. At cooler temperatures, uric acid turns into crystals. Since the toe is the part of the body that is farthest from the heart, it s also the coolest part of the body - and, thus, the most likely target of gout. However, gout can affect any joint in the body.  The tendency to accumulate uric acid is often inherited. Other factors that put a person at risk for developing gout include: high blood pressure, diabetes, obesity, surgery, chemotherapy, stress, and certain medications and vitamins. For example, the body s ability to remove uric acid can be negatively affected by taking aspirin, some diuretic medications ( water pills ), and the vitamin niacin (also called nicotinic acid). While gout is more common in men aged 40 to 60 years, it can occur in younger men as well as in women.  Consuming foods and beverages that contain high levels of purines can trigger an attack of gout. Some foods contain more purines than others and have been associated with an increase of uric acid, which leads to gout. You may be able to reduce your chances of getting a gout attack by limiting or avoiding shellfish, organ meats (kidney, liver, etc.), red wine, beer, and red meat.  Other Triggers include but are not  limited to:  Congestive heart failure, deep vein thrombosis, pneumonia, fasting, dehydration, trauma/surgery, psoriasis.  SYMTOMS  An attack of gout can be miserable, marked by the following symptoms:  Intense pain that comes on suddenly - often in the middle of the night or upon arising   Signs of inflammation such as redness, swelling, and warmth over the joint.   DIAGNOSIS  To diagnose gout, the foot and ankle surgeon will ask questions about your personal and family medical history, followed by an examination of the affected joint. Laboratory tests and x-rays are sometimes ordered to determine if the inflammation is caused by something other than gout.  TREATMENT  Initial treatment of an attack of gout typically includes the following:  Medications. Prescription medications or injections are used to treat the pain, swelling, and inflammation.   Dietary restrictions. Foods and beverages that are high in purines should be avoided, since purines are converted in the body to uric acid.   Fluids. Drink plenty of water and other fluids each day, while also avoiding alcoholic beverages, which cause dehydration. Cherry Juice works well to help decrease pain.  Immobilize and elevate the foot. Avoid standing and walking to give your foot a rest. Also, elevate your foot (level with or slightly above the heart) to help reduce swelling.   The symptoms of gout and the inflammatory process usually resolve in three to ten days with treatment. If gout symptoms continue despite the initial treatment, or if repeated attacks occur, see your primary care physician for maintenance treatment that may involve daily medication. In cases of repeated episodes, the underlying problem must be addressed, as the build-up of uric acid over time can cause arthritic damage to the joint.  DIET CHANGE  A gout diet reduces your intake of foods that are high in purines, which helps control your body's production of uric acid. If you're overweight or  obese, lose weight. However, avoid fasting and rapid weight loss because these can promote a gout attack. Drink plenty of fluids to help flush uric acid from your body. Also avoid high-protein diets, which can cause you to produce too much uric acid (hyperuricemia).   To follow the diet:   Limit meat, poultry and fish. Animal proteins are high in purine. Avoid or severely limit high-purine foods, such as organ meats, herring, anchovies and mackerel. Red meat (beef, pork and lamb), fatty fish and seafood (tuna, shrimp, lobster and scallops) are associated with increased risk of gout. Because all meat, poultry and fish contain purines, limit your intake to 4 to 6 ounces (113 to 170 grams) daily.   Eat more plant-based proteins. You can increase your protein by including more plant-based sources, such as beans and legumes. This switch will also help you cut down on saturated fats, which may indirectly contribute to obesity and gout.   Limit or avoid alcohol. Alcohol interferes with the elimination of uric acid from your body. Drinking beer, in particular, has been linked to gout attacks. If you're having an attack, avoid alcohol. However, when you're not having an attack, drinking one or two 5-ounce (148 milliliter) servings a day of wine is not likely to increase your risk.   Drink plenty of fluids, particularly water. Fluids can help remove uric acid from your body. Aim for eight to 16 8-ounce (237 milliliter) glasses a day.   Choose low-fat or fat-free dairy products. Some studies have shown that drinking skim or low-fat milk and eating foods made with them, such as yogurt, help reduce the risk of gout. Aim for adequate dairy intake of 16 to 24 fluid ounces (473 to 710 milliliters) daily.   Choose complex carbohydrates. Eat more whole grains and fruits and vegetables and fewer refined carbohydrates, such as white bread, cakes and candy.   Limit or avoid sugar. Too many sweets can leave you with no room for  plant-based proteins and low-fat or fat-free dairy products -- the foods you need to avoid gout. Sugary foods also tend to be high in calories, so they make it easier to eat more than you're likely to burn off. Although there's debate about whether sugar has a direct effect on uric acid levels, sweets are definitely linked to overweight and obesity.   There's also some evidence that drinking four to six cups of coffee a day lowers gout risk in men.   RESULTS  Following a gout diet can help you limit your body's uric acid production and increase its elimination. It's not likely to lower the uric acid concentration in your blood enough to treat your gout without medication, but it may help decrease the number of attacks and limit their severity. Following the gout diet and limiting your calories -- particularly if you also add in moderate daily exercise, such as brisk walking -- also can improve your overall health by helping you achieve and maintain a healthy weight.

## 2017-07-13 NOTE — LETTER
7/13/2017         RE: Favio Jacinto  [address] [address]        Dear Colleague,    Thank you for referring your patient, Favio Jacinto, to the [place] [facility]. Please see a copy of my visit note below.    PATIENT HISTORY:  Favio Jacinto is a 51 year old male who presents to clinic for a painful left foot .  The patient describes the pain as pins and needles with burning.  The patient relates pain is located along the lateral aspect of the left foot and ankle.  The patient relates the pain has been present for the past several weeks.  The patient relates pain with ambulation.  The patient has tried soaking with little relief.         REVIEW OF SYSTEMS:  Constitutional, HEENT, cardiovascular, pulmonary, GI, , musculoskeletal, neuro, skin, endocrine and psych systems are negative, except as otherwise noted.     PAST MEDICAL HISTORY:   Past Medical History:   Diagnosis Date     Sinusitis         PAST SURGICAL HISTORY:   Past Surgical History:   Procedure Laterality Date     HC EXC HIP PELVIS LES SC 3 CM/>       HERNIA REPAIR, INGUINAL RT/LT          MEDICATIONS:   Current Outpatient Prescriptions:      colchicine (COLCRYS) 0.6 MG tablet, Take 1 tablet (0.6 mg) by mouth 2 times daily, Disp: 60 tablet, Rfl: 3     allopurinol (ZYLOPRIM) 300 MG tablet, Take 1 tablet (300 mg) by mouth daily, Disp: 90 tablet, Rfl: 3     fluticasone (FLONASE) 50 MCG/ACT nasal spray, Spray 1-2 sprays into both nostrils daily, Disp: 16 g, Rfl: 1  No current facility-administered medications for this visit.     Facility-Administered Medications Ordered in Other Visits:      sodium chloride (PF) 0.9% PF flush 5-10 mL, 5-10 mL, Intravenous, Q5 Min PRN, Leonardo Valentine MD     sodium chloride bacteriostatic 0.9 % flush 0-1 mL, 0-1 mL, Intradermal, Once PRN, Leonardo Valentine MD     sodium chloride 0.9 % BOLUS 100 mL, 100 mL, Intravenous, Q5 Min PRN, Leonardo Valentine MD,  100 mL at 14 1006     iopamidol (ISOVUE-370) 76% solution 5-200 mL, 5-200 mL, Intravenous, Q5 Min PRN, Leonardo Valentine MD, 105 mL at 14 1005     metoprolol (LOPRESSOR) tablet  mg,  mg, Oral, Once PRN, Leonardo Valentine MD     metoprolol (LOPRESSOR) injection 5-15 mg, 5-15 mg, Intravenous, Q3 Min PRN, Leonardo Valentine MD, 10 mg at 14 0949     lidocaine (cardiac) (XYLOCAINE) injection 100 mg, 100 mg, Intravenous, Once PRN, Leonardo Valentine MD     nitroglycerin (NITROSTAT) SL tablet 0.4 mg, 0.4 mg, Sublingual, Q15 Min PRN, Leonardo Valentine MD, 0.4 mg at 14 0950     ALLERGIES:    Allergies   Allergen Reactions     Penicillins         SOCIAL HISTORY:   Social History     Social History     Marital status: Single     Spouse name: N/A     Number of children: N/A     Years of education: N/A     Occupational History     Not on file.     Social History Main Topics     Smoking status: Former Smoker     Types: Dip, chew, snus or snuff     Quit date: 2007     Smokeless tobacco: Former User     Types: Chew     Alcohol use Yes      Comment: occasional     Drug use: No     Sexual activity: Not Currently     Other Topics Concern     Not on file     Social History Narrative        FAMILY HISTORY:   Family History   Problem Relation Age of Onset     CANCER Father       at 60, smoker lung     CANCER Brother      testicular cancer, doing well. Skin cancer     Other Cancer Brother      Liver and lung     Respiratory Mother      asthma-uses Advair        EXAM:Vitals: Ht 1.829 m (6')  Wt 82.6 kg (182 lb)  BMI 24.68 kg/m2  BMI= Body mass index is 24.68 kg/(m^2).          General appearance: Patient is alert and fully cooperative with history & exam.  No sign of distress is noted during the visit.     Psychiatric: Affect is pleasant & appropriate.  Patient appears motivated to improve health.     Respiratory: Breathing is regular & unlabored while sitting.     HEENT:  Hearing is intact to spoken word.  Speech is clear.  No gross evidence of visual impairment that would impact ambulation.     Dermatologic: Skin is intact to both lower extremities without significant lesions, rash or abrasion.  No paronychia or evidence of soft tissue infection is noted.     Vascular: DP & PT pulses are intact & regular bilaterally.  No significant edema or varicosities noted.  CFT and skin temperature is normal to both lower extremities.     Neurologic: Lower extremity sensation is intact to light touch.  No evidence of weakness or contracture in the lower extremities.  No evidence of neuropathy.     Musculoskeletal: Patient is ambulatory without assistive device or brace.  No gross ankle deformity noted.  No foot or ankle joint effusion is noted.  Positive straight leg raise test on the left lower extremity.         ASSESSMENT / PLAN:   ICD-10-CM    1. Radiculopathy with lower extremity symptoms M54.10 SPORTS MEDICINE REFERRAL       I have explained to Favio  about the conditions.  We discussed the nature of the condition as well as the treatment plan and expected length of recovery.  At this time, the patient was referred to [facility] for further evaluation of lower lumbar radiculopathy.      Disclaimer: This note consists of symbols derived from keyboarding, dictation and/or voice recognition software. As a result, there may be errors in the script that have gone undetected. Please consider this when interpreting information found in this chart.       ERWIN Salazar.P.MARCIA., F.A.C.F.A.S.        Again, thank you for allowing me to participate in the care of your patient.        Sincerely,        Marco Hutson DPM

## 2017-07-13 NOTE — MR AVS SNAPSHOT
After Visit Summary   7/13/2017    Favio Jacinto    MRN: [id number]           Patient Information     Date Of Birth          1965        Visit Information        Provider Department      7/13/2017 2:00 PM Marco Hutson DPM [facility]        Today's Diagnoses     Radiculopathy with lower extremity symptoms    -  1      Care Instructions    GOUT  Gout is a disorder that results from the build-up of uric acid in the tissues or a joint. It most often affects the joint of the big toe.    CAUSES  Gout attacks are caused by deposits of crystallized uric acid in the joint. Uric acid is present in the blood and eliminated in the urine, but in people who have gout, uric acid accumulates and crystallizes in the joints. Uric acid is the result of the breakdown of purines, chemicals that are found naturally in our bodies and in food. Some people develop gout because their kidneys have difficulty eliminating normal amounts of uric acid, while others produce too much uric acid.  Gout occurs most commonly in the big toe because uric acid is sensitive to temperature changes. At cooler temperatures, uric acid turns into crystals. Since the toe is the part of the body that is farthest from the heart, it s also the coolest part of the body - and, thus, the most likely target of gout. However, gout can affect any joint in the body.  The tendency to accumulate uric acid is often inherited. Other factors that put a person at risk for developing gout include: high blood pressure, diabetes, obesity, surgery, chemotherapy, stress, and certain medications and vitamins. For example, the body s ability to remove uric acid can be negatively affected by taking aspirin, some diuretic medications ( water pills ), and the vitamin niacin (also called nicotinic acid). While gout is more common in men aged 40 to 60 years, it can occur in younger men as well as in women.  Consuming foods and  beverages that contain high levels of purines can trigger an attack of gout. Some foods contain more purines than others and have been associated with an increase of uric acid, which leads to gout. You may be able to reduce your chances of getting a gout attack by limiting or avoiding shellfish, organ meats (kidney, liver, etc.), red wine, beer, and red meat.  Other Triggers include but are not limited to:  Congestive heart failure, deep vein thrombosis, pneumonia, fasting, dehydration, trauma/surgery, psoriasis.  SYMTOMS  An attack of gout can be miserable, marked by the following symptoms:  Intense pain that comes on suddenly - often in the middle of the night or upon arising   Signs of inflammation such as redness, swelling, and warmth over the joint.   DIAGNOSIS  To diagnose gout, the foot and ankle surgeon will ask questions about your personal and family medical history, followed by an examination of the affected joint. Laboratory tests and x-rays are sometimes ordered to determine if the inflammation is caused by something other than gout.  TREATMENT  Initial treatment of an attack of gout typically includes the following:  Medications. Prescription medications or injections are used to treat the pain, swelling, and inflammation.   Dietary restrictions. Foods and beverages that are high in purines should be avoided, since purines are converted in the body to uric acid.   Fluids. Drink plenty of water and other fluids each day, while also avoiding alcoholic beverages, which cause dehydration. Cherry Juice works well to help decrease pain.  Immobilize and elevate the foot. Avoid standing and walking to give your foot a rest. Also, elevate your foot (level with or slightly above the heart) to help reduce swelling.   The symptoms of gout and the inflammatory process usually resolve in three to ten days with treatment. If gout symptoms continue despite the initial treatment, or if repeated attacks occur, see your  primary care physician for maintenance treatment that may involve daily medication. In cases of repeated episodes, the underlying problem must be addressed, as the build-up of uric acid over time can cause arthritic damage to the joint.  DIET CHANGE  A gout diet reduces your intake of foods that are high in purines, which helps control your body's production of uric acid. If you're overweight or obese, lose weight. However, avoid fasting and rapid weight loss because these can promote a gout attack. Drink plenty of fluids to help flush uric acid from your body. Also avoid high-protein diets, which can cause you to produce too much uric acid (hyperuricemia).   To follow the diet:   Limit meat, poultry and fish. Animal proteins are high in purine. Avoid or severely limit high-purine foods, such as organ meats, herring, anchovies and mackerel. Red meat (beef, pork and lamb), fatty fish and seafood (tuna, shrimp, lobster and scallops) are associated with increased risk of gout. Because all meat, poultry and fish contain purines, limit your intake to 4 to 6 ounces (113 to 170 grams) daily.   Eat more plant-based proteins. You can increase your protein by including more plant-based sources, such as beans and legumes. This switch will also help you cut down on saturated fats, which may indirectly contribute to obesity and gout.   Limit or avoid alcohol. Alcohol interferes with the elimination of uric acid from your body. Drinking beer, in particular, has been linked to gout attacks. If you're having an attack, avoid alcohol. However, when you're not having an attack, drinking one or two 5-ounce (148 milliliter) servings a day of wine is not likely to increase your risk.   Drink plenty of fluids, particularly water. Fluids can help remove uric acid from your body. Aim for eight to 16 8-ounce (237 milliliter) glasses a day.   Choose low-fat or fat-free dairy products. Some studies have shown that drinking skim or low-fat  milk and eating foods made with them, such as yogurt, help reduce the risk of gout. Aim for adequate dairy intake of 16 to 24 fluid ounces (473 to 710 milliliters) daily.   Choose complex carbohydrates. Eat more whole grains and fruits and vegetables and fewer refined carbohydrates, such as white bread, cakes and candy.   Limit or avoid sugar. Too many sweets can leave you with no room for plant-based proteins and low-fat or fat-free dairy products -- the foods you need to avoid gout. Sugary foods also tend to be high in calories, so they make it easier to eat more than you're likely to burn off. Although there's debate about whether sugar has a direct effect on uric acid levels, sweets are definitely linked to overweight and obesity.   There's also some evidence that drinking four to six cups of coffee a day lowers gout risk in men.   RESULTS  Following a gout diet can help you limit your body's uric acid production and increase its elimination. It's not likely to lower the uric acid concentration in your blood enough to treat your gout without medication, but it may help decrease the number of attacks and limit their severity. Following the gout diet and limiting your calories -- particularly if you also add in moderate daily exercise, such as brisk walking -- also can improve your overall health by helping you achieve and maintain a healthy weight.                   Follow-ups after your visit        Additional Services     SPORTS MEDICINE REFERRAL       Your provider has referred you to:  [facility]: [facility] [phone]   [email or web address]  Aleks Landa, DO  Please be aware that coverage of these services is subject to the terms and limitations of your health insurance plan.  Call member services at your health plan with any benefit or coverage questions.      Please bring the following to  your appointment:    >>   Any x-rays, CTs or MRIs which have been performed.  Contact the facility where they were done to arrange for  prior to your scheduled appointment.  Any new CT, MRI or other procedures ordered by your specialist must be performed at a [facility] facility or coordinated by your clinic's referral office.    >>   List of current medications   >>   This referral request   >>   Any documents/labs given to you for this referral                  Follow-up notes from your care team     Return in about 4 weeks (around 8/10/2017).      Who to contact     If you have questions or need follow up information about today's clinic visit or your schedule please contact [facility] directly at [phone].  Normal or non-critical lab and imaging results will be communicated to you by [employer], letter or phone within 4 business days after the clinic has received the results. If you do not hear from us within 7 days, please contact the clinic through [employer] or phone. If you have a critical or abnormal lab result, we will notify you by phone as soon as possible.  Submit refill requests through [employer] or call your pharmacy and they will forward the refill request to us. Please allow 3 business days for your refill to be completed.          Additional Information About Your Visit        [employer] Information     [employer] gives you secure access to your electronic health record. If you see a primary care provider, you can also send messages to your care team and make appointments. If you have questions, please call your primary care clinic.  If you do not have a primary care provider, please call [phone] and they will assist you.        Care EveryWhere ID     This is your Care EveryWhere ID. This could be used by other organizations to access your [facility] medical records  [id number]        Your Vitals Were     Height BMI (Body Mass Index)                1.829 m (6')  24.68 kg/m2           Blood Pressure from Last 3 Encounters:   05/12/17 112/74   04/26/17 90/70   11/07/16 118/77    Weight from Last 3 Encounters:   07/13/17 82.6 kg (182 lb)   05/12/17 82.7 kg (182 lb 4 oz)   04/26/17 84 kg (185 lb 4 oz)              We Performed the Following     SPORTS MEDICINE REFERRAL        Primary Care Provider Office Phone # Fax #    Leah Stringer [phone]       [place] [address] [place] [address]        Equal Access to Services     [facility]: Hadii aad ku hadasho Soomaali, waaxda luqadaha, qaybta kaalmada adeegyada, waxay idiin hayaan adeeg khjeremías arcos . So [facility] [phone].    ATENCIÓN: Si habla español, tiene a horne disposición servicios gratuitos de asistencia lingüística. [facility] [phone].    We comply with applicable federal civil rights laws and [place] laws. We do not discriminate on the basis of race, color, national origin, age, disability sex, sexual orientation or gender identity.            Thank you!     Thank you for choosing [facility]  for your care. Our goal is always to provide you with excellent care. Hearing back from our patients is one way we can continue to improve our services. Please take a few minutes to complete the written survey that you may receive in the mail after your visit with us. Thank you!             Your Updated Medication List - Protect others around you: Learn how to safely use, store and throw away your medicines at www.disposemymeds.org.          This list is accurate as of: 7/13/17  2:35 PM.  Always use your most recent med list.                   Brand Name Dispense Instructions for use Diagnosis    allopurinol 300 MG tablet    ZYLOPRIM    90 tablet    Take 1 tablet (300 mg) by mouth daily    Chronic gout without tophus, unspecified cause, unspecified site       colchicine 0.6 MG tablet    COLCRYS    60 tablet    Take 1 tablet (0.6 mg) by mouth 2  times daily    Chronic gout without tophus, unspecified cause, unspecified site       fluticasone 50 MCG/ACT spray    FLONASE    16 g    Spray 1-2 sprays into both nostrils daily    Chronic rhinitis

## 2017-08-09 ENCOUNTER — TELEPHONE (OUTPATIENT)
Dept: FAMILY MEDICINE | Facility: CLINIC | Age: 52
End: 2017-08-09

## 2017-08-09 DIAGNOSIS — Z11.59 NEED FOR HEPATITIS C SCREENING TEST: ICD-10-CM

## 2017-08-09 DIAGNOSIS — M10.9 ACUTE GOUTY ARTHRITIS: ICD-10-CM

## 2017-08-09 PROCEDURE — 86803 HEPATITIS C AB TEST: CPT | Performed by: FAMILY MEDICINE

## 2017-08-09 PROCEDURE — 84550 ASSAY OF BLOOD/URIC ACID: CPT | Performed by: FAMILY MEDICINE

## 2017-08-09 PROCEDURE — 36415 COLL VENOUS BLD VENIPUNCTURE: CPT | Performed by: FAMILY MEDICINE

## 2017-08-09 RX ORDER — PREDNISONE 20 MG/1
40 TABLET ORAL DAILY
Qty: 10 TABLET | Refills: 0 | Status: SHIPPED | OUTPATIENT
Start: 2017-08-09 | End: 2018-05-08

## 2017-08-09 RX ORDER — HYDROCODONE BITARTRATE AND ACETAMINOPHEN 5; 325 MG/1; MG/1
1 TABLET ORAL EVERY 6 HOURS PRN
Qty: 20 TABLET | Refills: 0 | Status: SHIPPED | OUTPATIENT
Start: 2017-08-09 | End: 2017-08-28

## 2017-08-09 NOTE — TELEPHONE ENCOUNTER
Called and spoke with patient, his flare of his right foot big toe started Monday night and has gotten progressively worse.  Is taking allopurinol as order every day   Is requesting prednisone and pain medication   pharmacy pended   Chart routed to [place] to address  [facility]  Clinic  RN/Joel Canales

## 2017-08-09 NOTE — TELEPHONE ENCOUNTER
Pt is calling and states that he is on gout medication and he is having a gout flare up on his right foot, he states its pretty bad.  he is asking if he can get prednisone or indomethacin, and pain med refill of Vicodin, he is really uncomfortable.      Please triage. Thank you     Karen Cronin, Station

## 2017-08-10 LAB
HCV AB SERPL QL IA: NORMAL
URATE SERPL-MCNC: 5.2 MG/DL (ref 3.5–7.2)

## 2017-08-17 ENCOUNTER — TELEPHONE (OUTPATIENT)
Dept: FAMILY MEDICINE | Facility: CLINIC | Age: 52
End: 2017-08-17

## 2017-08-17 NOTE — TELEPHONE ENCOUNTER
Pt is having a gout flare up and is asking for  Prednisone and indomethicin and or any thing else, please call to triage.     Karen Cronin, [place]

## 2017-08-28 ENCOUNTER — TELEPHONE (OUTPATIENT)
Dept: FAMILY MEDICINE | Facility: CLINIC | Age: 52
End: 2017-08-28

## 2017-08-28 DIAGNOSIS — M10.9 ACUTE GOUTY ARTHRITIS: ICD-10-CM

## 2017-08-28 RX ORDER — HYDROCODONE BITARTRATE AND ACETAMINOPHEN 5; 325 MG/1; MG/1
1 TABLET ORAL EVERY 6 HOURS PRN
Qty: 20 TABLET | Refills: 0 | Status: SHIPPED | OUTPATIENT
Start: 2017-08-28 | End: 2018-05-08

## 2017-08-28 RX ORDER — INDOMETHACIN 50 MG/1
50 CAPSULE ORAL
Qty: 42 CAPSULE | Refills: 1 | Status: SHIPPED | OUTPATIENT
Start: 2017-08-28 | End: 2018-05-08

## 2017-08-28 NOTE — TELEPHONE ENCOUNTER
Routing refill request to provider for review/approval because:  Drug not on the [facility] refill protocol or controlled substance  PRachel Henson  Clinic  RN/Joel Canales

## 2018-02-02 ENCOUNTER — TRANSFERRED RECORDS (OUTPATIENT)
Dept: HEALTH INFORMATION MANAGEMENT | Facility: CLINIC | Age: 53
End: 2018-02-02

## 2018-05-08 ENCOUNTER — OFFICE VISIT (OUTPATIENT)
Dept: FAMILY MEDICINE | Facility: CLINIC | Age: 53
End: 2018-05-08
Payer: COMMERCIAL

## 2018-05-08 VITALS
HEART RATE: 60 BPM | WEIGHT: 187.13 LBS | SYSTOLIC BLOOD PRESSURE: 112 MMHG | BODY MASS INDEX: 25.35 KG/M2 | HEIGHT: 72 IN | DIASTOLIC BLOOD PRESSURE: 74 MMHG

## 2018-05-08 DIAGNOSIS — Z00.00 ROUTINE GENERAL MEDICAL EXAMINATION AT A HEALTH CARE FACILITY: Primary | ICD-10-CM

## 2018-05-08 DIAGNOSIS — L98.9 SKIN LESION: ICD-10-CM

## 2018-05-08 DIAGNOSIS — I35.0 NONRHEUMATIC AORTIC VALVE STENOSIS: ICD-10-CM

## 2018-05-08 DIAGNOSIS — Z13.6 CARDIOVASCULAR SCREENING; LDL GOAL LESS THAN 160: ICD-10-CM

## 2018-05-08 DIAGNOSIS — K21.9 GASTROESOPHAGEAL REFLUX DISEASE, ESOPHAGITIS PRESENCE NOT SPECIFIED: ICD-10-CM

## 2018-05-08 DIAGNOSIS — M1A.9XX0 CHRONIC GOUT WITHOUT TOPHUS, UNSPECIFIED CAUSE, UNSPECIFIED SITE: ICD-10-CM

## 2018-05-08 PROCEDURE — 36415 COLL VENOUS BLD VENIPUNCTURE: CPT | Performed by: FAMILY MEDICINE

## 2018-05-08 PROCEDURE — 99396 PREV VISIT EST AGE 40-64: CPT | Mod: 25 | Performed by: FAMILY MEDICINE

## 2018-05-08 PROCEDURE — 80053 COMPREHEN METABOLIC PANEL: CPT | Performed by: FAMILY MEDICINE

## 2018-05-08 PROCEDURE — 84550 ASSAY OF BLOOD/URIC ACID: CPT | Performed by: FAMILY MEDICINE

## 2018-05-08 PROCEDURE — 17110 DESTRUCTION B9 LES UP TO 14: CPT | Performed by: FAMILY MEDICINE

## 2018-05-08 PROCEDURE — 80061 LIPID PANEL: CPT | Performed by: FAMILY MEDICINE

## 2018-05-08 NOTE — MR AVS SNAPSHOT
After Visit Summary   5/8/2018    Favio Jacinto    MRN: [id number]           Patient Information     Date Of Birth          1965        Visit Information        Provider Department      5/8/2018 4:00 PM Leah Stringer MD [facility]        Today's Diagnoses     Routine general medical examination at a health care facility    -  1    CARDIOVASCULAR SCREENING; LDL GOAL LESS THAN 160        Chronic gout without tophus, unspecified cause, unspecified site        Skin lesion        Nonrheumatic aortic valve stenosis        Gastroesophageal reflux disease, esophagitis presence not specified          Care Instructions      Preventive Health Recommendations  Male Ages 50 - 64    *   The spot looks like a keratosis, not cancer but, will freeze today. If it doesn't go away, come back in one month if it's still there.     *   Call about setting up an echocardiogram; [phone].     *    Will check uric acid levels for gout.     *    Careful with the alcohol.     *    Time to complete quit chewing.         Yearly exam:             See your health care provider every year in order to  o   Review health changes.   o   Discuss preventive care.    o   Review your medicines if your doctor has prescribed any.     Have a cholesterol test every 5 years, or more frequently if you are at risk for high cholesterol/heart disease.     Have a diabetes test (fasting glucose) every three years. If you are at risk for diabetes, you should have this test more often.     Have a colonoscopy at age 50, or have a yearly FIT test (stool test). These exams will check for colon cancer.      Talk with your health care provider about whether or not a prostate cancer screening test (PSA) is right for you.    You should be tested each year for STDs (sexually transmitted diseases), if you re at risk.     Shots: Get a flu shot each year. Get a tetanus shot every 10 years.     Nutrition:    Eat at least 5 servings  of fruits and vegetables daily.     Eat whole-grain bread, whole-wheat pasta and brown rice instead of white grains and rice.     Talk to your provider about Calcium and Vitamin D.     Lifestyle    Exercise for at least 150 minutes a week (30 minutes a day, 5 days a week). This will help you control your weight and prevent disease.     Limit alcohol to one drink per day.     No smoking.     Wear sunscreen to prevent skin cancer.     See your dentist every six months for an exam and cleaning.     See your eye doctor every 1 to 2 years.            Follow-ups after your visit        Future tests that were ordered for you today     Open Future Orders        Priority Expected Expires Ordered    Echocardiogram Complete Routine  5/8/2019 5/8/2018            Who to contact     Normal or non-critical lab and imaging results will be communicated to you by Canaryt, letter or phone within 4 business days after the clinic has received the results. If you do not hear from us within 7 days, please contact the clinic through Canaryt or phone. If you have a critical or abnormal lab result, we will notify you by phone as soon as possible.  Submit refill requests through Neolane or call your pharmacy and they will forward the refill request to us. Please allow 3 business days for your refill to be completed.          If you need to speak with a  for additional information , please call: [phone]           Additional Information About Your Visit        Neolane Information     Neolane gives you secure access to your electronic health record. If you see a primary care provider, you can also send messages to your care team and make appointments. If you have questions, please call your primary care clinic.  If you do not have a primary care provider, please call [phone] and they will assist you.        Care EveryWhere ID     This is your Care EveryWhere ID. This could be used by other organizations to access your  [place] medical records  [id number]        Your Vitals Were     Pulse Height BMI (Body Mass Index)             60 6' (1.829 m) 25.38 kg/m2          Blood Pressure from Last 3 Encounters:   05/08/18 112/74   05/12/17 112/74   04/26/17 90/70    Weight from Last 3 Encounters:   05/08/18 187 lb 2 oz (84.9 kg)   07/13/17 182 lb (82.6 kg)   05/12/17 182 lb 4 oz (82.7 kg)              We Performed the Following     Comprehensive metabolic panel (BMP + Alb, Alk Phos, ALT, AST, Total. Bili, TP)     Lipid Profile (Chol, Trig, HDL, LDL calc)          Today's Medication Changes          These changes are accurate as of 5/8/18  4:21 PM.  If you have any questions, ask your nurse or doctor.               Stop taking these medicines if you haven't already. Please contact your care team if you have questions.     allopurinol 300 MG tablet   Commonly known as:  ZYLOPRIM   Stopped by:  Leah Stringer MD           colchicine 0.6 MG tablet   Commonly known as:  COLCRYS   Stopped by:  Leah Stringer MD           fluticasone 50 MCG/ACT spray   Commonly known as:  FLONASE   Stopped by:  Leah Stringer MD           HYDROcodone-acetaminophen 5-325 MG per tablet   Commonly known as:  NORCO   Stopped by:  Leah Stringer MD           indomethacin 50 MG capsule   Commonly known as:  INDOCIN   Stopped by:  Leah Stringer MD           predniSONE 20 MG tablet   Commonly known as:  DELTASONE   Stopped by:  Leah Stringer MD                    Primary Care Provider Office Phone # Fax #    Leah Stringer [phone] [address] [place] [address]        Equal Access to Services     [facility] Center[facility] AH: Hadii rakan marc Sokarina, waaxda luqadaha, qaybta kaalmada adeegyada, sadie greenberg. So [facility] [phone].    ATENCIÓN: Si habla español, tiene a horne disposición servicios gratuitos de asistencia lingüística. Llame al [phone].    We comply with applicable federal  civil rights laws and [place] laws. We do not discriminate on the basis of race, color, national origin, age, disability, sex, sexual orientation, or gender identity.            Thank you!     Thank you for choosing [facility]  for your care. Our goal is always to provide you with excellent care. Hearing back from our patients is one way we can continue to improve our services. Please take a few minutes to complete the written survey that you may receive in the mail after your visit with us. Thank you!             Your Updated Medication List - Protect others around you: Learn how to safely use, store and throw away your medicines at www.disposemymeds.org.      Notice  As of 5/8/2018  4:21 PM    You have not been prescribed any medications.

## 2018-05-08 NOTE — PROGRESS NOTES
SUBJECTIVE:   CC: Favio Jacinto is an 52 year old male who presents for preventative health visit.     - Colonoscopy with hyperplastic polyps 8/11/14. No family history of colorectal cancer in first-degree relative. On q 10 year colonoscopy schedule.      Healthy Habits:    Do you get at least three servings of calcium containing foods daily (dairy, green leafy vegetables, etc.)? NO    Amount of exercise or daily activities, outside of work: 3 day(s) per week    Problems taking medications regularly not applicable    Medication side effects: No    Have you had an eye exam in the past two years? yes    Do you see a dentist twice per year? yes    Do you have sleep apnea, excessive snoring or daytime drowsiness?no       Abdomen: Patient endorses intermittent L sided abdominal pain.     Skin: He was in [place] a few months ago and then noticed a spot on his R clavicle and would like this checked.     Medication: Patient stopped taking gout medication because it had side effects and caused a gout flare.     Diet: Patient has quit drinking beer and wine.       Today's PHQ-2 Score:   PHQ-2 ( 1999 Pf[employer]) 5/8/2018 4/26/2017   Q1: Little interest or pleasure in doing things 0 1   Q2: Feeling down, depressed or hopeless 0 0   PHQ-2 Score 0 1     Abuse: Current or Past(Physical, Sexual or Emotional)- No  Do you feel safe in your environment - Yes    Social History   Substance Use Topics     Smoking status: Former Smoker     Types: Dip, chew, snus or snuff     Quit date: 1/1/2007     Smokeless tobacco: Former User     Types: Chew     Alcohol use Yes      Comment: occasional      If you drink alcohol do you typically have >3 drinks per day or >7 drinks per week? No                      Last PSA:   PSA   Date Value Ref Range Status   04/26/2017 0.77 0 - 4 ug/L Final     Comment:     Assay Method:  Chemiluminescence using Siemens Vista analyzer       Reviewed orders with patient. Reviewed health maintenance and updated orders  accordingly - Yes    Problem list, Medication list, Allergies, and Medical/Social/Surgical/Family histories reviewed in [facility] and updated as appropriate.  Labs reviewed in EPIC      ROS:  C: NEGATIVE for fever, chills, change in weight  I: NEGATIVE for worrisome rashes, moles or lesions. POSITIVE for lesion to R clavicle  E: NEGATIVE for vision changes or irritation  ENT: NEGATIVE for ear, mouth and throat problems  R: NEGATIVE for significant cough or SOB  CV: NEGATIVE for chest pain, palpitations or peripheral edema  GI: POSITIVE for intermittent left sided abdominal pain. NEGATIVE for nausea, heartburn, or change in bowel habits   male: negative for dysuria, hematuria, decreased urinary stream, erectile dysfunction, urethral discharge  M: NEGATIVE for significant arthralgias or myalgia  N: NEGATIVE for weakness, dizziness or paresthesias  P: NEGATIVE for changes in mood or affect    This document serves as a record of the services and decisions personally performed and made by Leah Stringer MD. It was created on his behalf by Selina Narvaez, a trained medical scribe. The creation of this document is based the provider's statements to the medical scribe.    Selina Narvaez May 8, 2018 4:07 PM  OBJECTIVE:   /74  Pulse 60  Ht 1.829 m (6')  Wt 84.9 kg (187 lb 2 oz)  BMI 25.38 kg/m2  EXAM:  GENERAL: Healthy, alert and no distress  EYES: Eyes grossly normal to inspection, conjunctivae and sclerae normal  HENT: Ear canals and TM's normal, nose and mouth without ulcers or lesions  NECK: No adenopathy or thyromegaly  RESP: Lungs clear to auscultation - no rales, rhonchi or wheezes  CV: Regular rate and rhythm, normal S1 S2, no murmur  ABDOMEN: Soft, nontender  MS: No gross musculoskeletal defects noted, no edema  SKIN: raised 3 mm firm tan nodule just superior to the mid R clavicle. No suspicious lesions or rashes  NEURO: Normal strength and tone, mentation intact and speech normal  PSYCH: Mentation appears  normal, affect normal/bright    .Procedure:  Skin lesion frozen x2 using liquid nitrogen    ASSESSMENT/PLAN:   (Z00.00) Routine general medical examination at a health care facility  (primary encounter diagnosis)  Comment: Reviewed lifestyle, current conditions, medications, routine screenings, labs.  Plan: Follow up in one year for annual physical, sooner to address other conditions.    (Z13.6) CARDIOVASCULAR SCREENING; LDL GOAL LESS THAN 160  Comment: Routine check. Lab results pending.  Plan: Lipid Profile (Chol, Trig, HDL, LDL calc),         CANCELED: Lipid panel reflex to direct LDL         Fasting          (M1A.9XX0) Chronic gout without tophus, unspecified cause, unspecified site  Comment: Intolerant to preventative medications. Improved with dietary modifications such as stopping fermented beverages like wine and beer.   Plan: Comprehensive metabolic panel (BMP + Alb, Alk         Phos, ALT, AST, Total. Bili, TP)        Will check uric acid level and monitor or symptoms.     (L98.9) Skin lesion  Comment: Frozen x2 using liquid nitrogen  Plan: If the lesion does not resolve follow up for biopsy.    (I35.0) Nonrheumatic aortic valve stenosis  Comment: Mild to moderate, BP normal, will follow with serial echocardiograms.   Plan: Echocardiogram Complete          (K21.9) Gastroesophageal reflux disease, esophagitis presence not specified  Comment: No current symptoms. Not on medications.   Plan: Continue present management.      COUNSELING:  Reviewed preventive health counseling, as reflected in patient instructions       Regular exercise       Healthy diet/nutrition       Colon cancer screening     reports that he quit smoking about 11 years ago. His smoking use included Dip, chew, snus or snuff. He has quit using smokeless tobacco. His smokeless tobacco use included Chew.    Estimated body mass index is 25.38 kg/(m^2) as calculated from the following:    Height as of this encounter: 1.829 m (6').    Weight as of  this encounter: 84.9 kg (187 lb 2 oz).   Weight management plan: Discussed healthy diet and exercise guidelines and patient will follow up in 12 months in clinic to re-evaluate.    Counseling Resources:  ATP IV Guidelines  Pooled Cohorts Equation Calculator  FRAX Risk Assessment  ICSI Preventive Guidelines  Dietary Guidelines for Americans, 2010  USDA's MyPlate  ASA Prophylaxis  Lung CA Screening    Patient Instructions     Preventive Health Recommendations  Male Ages 50 - 64    *   The spot looks like a keratosis, not cancer but, will freeze today. If it doesn't go away, come back in one month if it's still there.     *   Call about setting up an echocardiogram; [phone].     *    Will check uric acid levels for gout.     *    Careful with the alcohol.     *    Time to complete quit chewing.         Yearly exam:             See your health care provider every year in order to  o   Review health changes.   o   Discuss preventive care.    o   Review your medicines if your doctor has prescribed any.     Have a cholesterol test every 5 years, or more frequently if you are at risk for high cholesterol/heart disease.     Have a diabetes test (fasting glucose) every three years. If you are at risk for diabetes, you should have this test more often.     Have a colonoscopy at age 50, or have a yearly FIT test (stool test). These exams will check for colon cancer.      Talk with your health care provider about whether or not a prostate cancer screening test (PSA) is right for you.    You should be tested each year for STDs (sexually transmitted diseases), if you re at risk.     Shots: Get a flu shot each year. Get a tetanus shot every 10 years.     Nutrition:    Eat at least 5 servings of fruits and vegetables daily.     Eat whole-grain bread, whole-wheat pasta and brown rice instead of white grains and rice.     Talk to your provider about Calcium and Vitamin D.     Lifestyle    Exercise for at least 150 minutes a  week (30 minutes a day, 5 days a week). This will help you control your weight and prevent disease.     Limit alcohol to one drink per day.     No smoking.     Wear sunscreen to prevent skin cancer.     See your dentist every six months for an exam and cleaning.     See your eye doctor every 1 to 2 years.      The information in this documents, created by a scribe for me, accurately reflects the services I personally performed and the decisions made by me. I have reviewed and approved this document for accuracy. 4:23 PM May 8, 2018      Leah Stringer MD  [facility]

## 2018-05-08 NOTE — PATIENT INSTRUCTIONS
Preventive Health Recommendations  Male Ages 50   64    *   The spot looks like a keratosis, not cancer but, will freeze today. If it doesn't go away, come back in one month if it's still there.     *   Call about setting up an echocardiogram; [phone].     *    Will check uric acid levels for gout.     *    Careful with the alcohol.     *    Time to complete quit chewing.         Yearly exam:             See your health care provider every year in order to  o   Review health changes.   o   Discuss preventive care.    o   Review your medicines if your doctor has prescribed any.     Have a cholesterol test every 5 years, or more frequently if you are at risk for high cholesterol/heart disease.     Have a diabetes test (fasting glucose) every three years. If you are at risk for diabetes, you should have this test more often.     Have a colonoscopy at age 50, or have a yearly FIT test (stool test). These exams will check for colon cancer.      Talk with your health care provider about whether or not a prostate cancer screening test (PSA) is right for you.    You should be tested each year for STDs (sexually transmitted diseases), if you re at risk.     Shots: Get a flu shot each year. Get a tetanus shot every 10 years.     Nutrition:    Eat at least 5 servings of fruits and vegetables daily.     Eat whole-grain bread, whole-wheat pasta and brown rice instead of white grains and rice.     Talk to your provider about Calcium and Vitamin D.     Lifestyle    Exercise for at least 150 minutes a week (30 minutes a day, 5 days a week). This will help you control your weight and prevent disease.     Limit alcohol to one drink per day.     No smoking.     Wear sunscreen to prevent skin cancer.     See your dentist every six months for an exam and cleaning.     See your eye doctor every 1 to 2 years.

## 2018-05-08 NOTE — LETTER
May 14, 2018      Favio Jacinto  [address] [address]        Dear ,    We are writing to inform you of your test results.    Your blood tests show that you have normal kidney and liver function, and there is no signs of diabetes. Please see enclosed copies.     The uric acid level, the crystals is performed gout, are elevated. If you having ongoing joint pain, restarting medication for gout might help. Please let me know.     Your cholesterol is acceptable, at this time, there is no strong reason to take a cholesterol-lowering medication.     Resulted Orders   Lipid Profile (Chol, Trig, HDL, LDL calc)   Result Value Ref Range    Cholesterol 170 <200 mg/dL    Triglycerides 201 (H) <150 mg/dL      Comment:      Borderline high:  150-199 mg/dl  High:             200-499 mg/dl  Very high:       >499 mg/dl  Non Fasting      HDL Cholesterol 52 >39 mg/dL    LDL Cholesterol Calculated 78 <100 mg/dL      Comment:      Desirable:       <100 mg/dl    Non HDL Cholesterol 118 <130 mg/dL   Comprehensive metabolic panel (BMP + Alb, Alk Phos, ALT, AST, Total. Bili, TP)   Result Value Ref Range    Sodium 137 133 - 144 mmol/L    Potassium 3.6 3.4 - 5.3 mmol/L    Chloride 107 94 - 109 mmol/L    Carbon Dioxide 24 20 - 32 mmol/L    Anion Gap 6 3 - 14 mmol/L    Glucose 93 70 - 99 mg/dL      Comment:      Non Fasting    Urea Nitrogen 19 7 - 30 mg/dL    Creatinine 0.96 0.66 - 1.25 mg/dL    GFR Estimate 82 >60 mL/min/1.7m2      Comment:      Non  GFR Calc    GFR Estimate If Black >90 >60 mL/min/1.7m2      Comment:       GFR Calc    Calcium 8.4 (L) 8.5 - 10.1 mg/dL    Bilirubin Total 0.4 0.2 - 1.3 mg/dL    Albumin 3.9 3.4 - 5.0 g/dL    Protein Total 7.2 6.8 - 8.8 g/dL    Alkaline Phosphatase 56 40 - 150 U/L    ALT 24 0 - 70 U/L    AST 20 0 - 45 U/L   Uric acid   Result Value Ref Range    Uric Acid 7.4 (H) 3.5 - 7.2 mg/dL       If you have any questions or concerns, please  call the clinic at the number listed above.       Sincerely,        Leah Stringer MD / lorri

## 2018-05-09 LAB
ALBUMIN SERPL-MCNC: 3.9 G/DL (ref 3.4–5)
ALP SERPL-CCNC: 56 U/L (ref 40–150)
ALT SERPL W P-5'-P-CCNC: 24 U/L (ref 0–70)
ANION GAP SERPL CALCULATED.3IONS-SCNC: 6 MMOL/L (ref 3–14)
AST SERPL W P-5'-P-CCNC: 20 U/L (ref 0–45)
BILIRUB SERPL-MCNC: 0.4 MG/DL (ref 0.2–1.3)
BUN SERPL-MCNC: 19 MG/DL (ref 7–30)
CALCIUM SERPL-MCNC: 8.4 MG/DL (ref 8.5–10.1)
CHLORIDE SERPL-SCNC: 107 MMOL/L (ref 94–109)
CHOLEST SERPL-MCNC: 170 MG/DL
CO2 SERPL-SCNC: 24 MMOL/L (ref 20–32)
CREAT SERPL-MCNC: 0.96 MG/DL (ref 0.66–1.25)
GFR SERPL CREATININE-BSD FRML MDRD: 82 ML/MIN/1.7M2
GLUCOSE SERPL-MCNC: 93 MG/DL (ref 70–99)
HDLC SERPL-MCNC: 52 MG/DL
LDLC SERPL CALC-MCNC: 78 MG/DL
NONHDLC SERPL-MCNC: 118 MG/DL
POTASSIUM SERPL-SCNC: 3.6 MMOL/L (ref 3.4–5.3)
PROT SERPL-MCNC: 7.2 G/DL (ref 6.8–8.8)
SODIUM SERPL-SCNC: 137 MMOL/L (ref 133–144)
TRIGL SERPL-MCNC: 201 MG/DL
URATE SERPL-MCNC: 7.4 MG/DL (ref 3.5–7.2)

## 2018-07-05 ENCOUNTER — HOSPITAL ENCOUNTER (OUTPATIENT)
Dept: CARDIOLOGY | Facility: CLINIC | Age: 53
Discharge: HOME | End: 2018-07-05
Attending: FAMILY MEDICINE | Admitting: FAMILY MEDICINE
Payer: COMMERCIAL

## 2018-07-05 DIAGNOSIS — I35.0 NONRHEUMATIC AORTIC VALVE STENOSIS: ICD-10-CM

## 2018-07-05 PROCEDURE — 93306 TTE W/DOPPLER COMPLETE: CPT | Mod: 26 | Performed by: INTERNAL MEDICINE

## 2018-07-05 PROCEDURE — 93306 TTE W/DOPPLER COMPLETE: CPT

## 2018-10-31 ENCOUNTER — OFFICE VISIT (OUTPATIENT)
Dept: FAMILY MEDICINE | Facility: CLINIC | Age: 53
End: 2018-10-31
Payer: COMMERCIAL

## 2018-10-31 VITALS
HEIGHT: 72 IN | WEIGHT: 183.38 LBS | HEART RATE: 64 BPM | DIASTOLIC BLOOD PRESSURE: 70 MMHG | SYSTOLIC BLOOD PRESSURE: 118 MMHG | BODY MASS INDEX: 24.84 KG/M2

## 2018-10-31 DIAGNOSIS — L57.0 BENIGN KERATOSIS: Primary | ICD-10-CM

## 2018-10-31 DIAGNOSIS — M1A.9XX0 CHRONIC GOUT WITHOUT TOPHUS, UNSPECIFIED CAUSE, UNSPECIFIED SITE: ICD-10-CM

## 2018-10-31 DIAGNOSIS — I48.0 PAROXYSMAL ATRIAL FIBRILLATION (H): ICD-10-CM

## 2018-10-31 DIAGNOSIS — K21.9 GASTROESOPHAGEAL REFLUX DISEASE, ESOPHAGITIS PRESENCE NOT SPECIFIED: ICD-10-CM

## 2018-10-31 DIAGNOSIS — Q23.81 BICUSPID AORTIC VALVE: ICD-10-CM

## 2018-10-31 PROBLEM — I48.91 ATRIAL FIBRILLATION (H): Status: ACTIVE | Noted: 2018-10-31

## 2018-10-31 PROCEDURE — 99214 OFFICE O/P EST MOD 30 MIN: CPT | Performed by: FAMILY MEDICINE

## 2018-10-31 NOTE — MR AVS SNAPSHOT
After Visit Summary   10/31/2018    Favio Jacinto    MRN: [id number]           Patient Information     Date Of Birth          1965        Visit Information        Provider Department      10/31/2018 3:20 PM Leah Stringer MD [facility]        Today's Diagnoses     Benign keratosis    -  1    Paroxysmal atrial fibrillation (H)        Bicuspid aortic valve        Gastroesophageal reflux disease, esophagitis presence not specified        Chronic gout without tophus, unspecified cause, unspecified site          Care Instructions    *   The spot is a harmless keratosis. Just watch for changes    *    Sorry about the wine.     *    For the bicuspid aortic valve, repeat the echocardiogram in July 2019. Call [phone].     *    Call about about setting up a scope of your stomach. [phone].               Follow-ups after your visit        Additional Services     GASTROENTEROLOGY ADULT REF PROCEDURE ONLY [facility] [phone]; [facility] Surgeon       Last Lab Result: Creatinine (mg/dL)       Date                     Value                 05/08/2018               0.96             ----------  Body mass index is 24.87 kg/(m^2).     Needed:  No  Language:  English    Patient will be contacted to schedule procedure.     Please be aware that coverage of these services is subject to the terms and limitations of your health insurance plan.  Call member services at your health plan with any benefit or coverage questions.  Any procedures must be performed at a [facility] facility OR coordinated by your clinic's referral office.    Please bring the following with you to your appointment:    (1) Any X-Rays, CTs or MRIs which have been performed.  Contact the facility where they were done to arrange for  prior to your scheduled appointment.    (2) List of current medications   (3) This referral request   (4) Any documents/labs given to you for this  referral                  Follow-up notes from your care team     Return in about 1 year (around 10/31/2019) for Physical Exam.      Future tests that were ordered for you today     Open Future Orders        Priority Expected Expires Ordered    Echocardiogram Complete Routine  10/31/2019 10/31/2018            Who to contact     Normal or non-critical lab and imaging results will be communicated to you by [employer], letter or phone within 4 business days after the clinic has received the results. If you do not hear from us within 7 days, please contact the clinic through [employer] or phone. If you have a critical or abnormal lab result, we will notify you by phone as soon as possible.  Submit refill requests through [employer] or call your pharmacy and they will forward the refill request to us. Please allow 3 business days for your refill to be completed.          If you need to speak with a  for additional information , please call: [phone]           Additional Information About Your Visit        [employer] Information     [employer] gives you secure access to your electronic health record. If you see a primary care provider, you can also send messages to your care team and make appointments. If you have questions, please call your primary care clinic.  If you do not have a primary care provider, please call [phone] and they will assist you.        Care EveryWhere ID     This is your Care EveryWhere ID. This could be used by other organizations to access your [facility] medical records  [id number]        Your Vitals Were     Pulse Height BMI (Body Mass Index)             64 6' (1.829 m) 24.87 kg/m2          Blood Pressure from Last 3 Encounters:   10/31/18 118/70   05/08/18 112/74   05/12/17 112/74    Weight from Last 3 Encounters:   10/31/18 183 lb 6 oz (83.2 kg)   05/08/18 187 lb 2 oz (84.9 kg)   07/13/17 182 lb (82.6 kg)              We Performed the Following     GASTROENTEROLOGY ADULT REF  PROCEDURE ONLY [facility] [phone]; [place] [facility] Surgeon        Primary Care Provider Office Phone # Fax #    Leah Stringer [phone] [address] [place] [address]        Equal Access to Services     [facility] JATIN AH: Hadii aad ku hadasho Soomaali, waaxda luqadaha, qaybta kaalmada adeegyada, waxay idiin hayaan adeeg kharash la'aan ah. So [facility] [phone].    ATENCIÓN: Si habla español, tiene a horne disposición servicios gratuitos de asistencia lingüística. Llame al [phone].    We comply with applicable federal civil rights laws and [place] laws. We do not discriminate on the basis of race, color, national origin, age, disability, sex, sexual orientation, or gender identity.            Thank you!     Thank you for choosing [facility]  for your care. Our goal is always to provide you with excellent care. Hearing back from our patients is one way we can continue to improve our services. Please take a few minutes to complete the written survey that you may receive in the mail after your visit with us. Thank you!             Your Updated Medication List - Protect others around you: Learn how to safely use, store and throw away your medicines at www.disposemymeds.org.      Notice  As of 10/31/2018  3:44 PM    You have not been prescribed any medications.

## 2018-10-31 NOTE — PROGRESS NOTES
SUBJECTIVE:   Favio Jacinto is a 53 year old male who presents to clinic today for the following health issues:      Skin Concerns  Patient states he noticed a dark raised spot on right side of groin x 3-4 months. Area is itching. This has not changed in color, shape or size. No fevers. No pain.    Paroxsymal Atrial Fibrillation  Reviewed with patient.    Bicuspid Aortic Valve   Reviewed with patient.    07/05/2018  Procedure  Complete Echo Adult.     Interpretation Summary     1. The left ventricle is normal in structure, function and size. The visual  ejection fraction is estimated at 60%.  2. The right ventricle is normal in structure, function and size.  3. The aortic valve is bicuspid. Moderate valvular aortic stenosis. Mean  27mmHg, Vmax 3.4m/s, TRISH 1.5cm2, DI 0.35.  4. The ascending aorta is Mildly dilated (4.1cm).     No significant change from 5/2017, previously AS with mean 25mmHg, Vmax  3.1m/s, DI 0.33, aorta 4.2cm.  Problem list and histories reviewed & adjusted, as indicated.  Additional history: as documented    Gout  Hx of gout.  Currently not taking any medications at this time.    GERD  Hx of GERD.  Reviewed with patient.    Labs reviewed in EPIC    Reviewed and updated as needed this visit by clinical staff  Tobacco  Allergies  Meds  Med Hx  Surg Hx  Fam Hx  Soc Hx      Reviewed and updated as needed this visit by Provider         ROS:  CONSTITUTIONAL: NEGATIVE for fever, chills, change in weight  INTEGUMENTARY/SKIN: POSITIVE for lesion groin right side  ENT/MOUTH: NEGATIVE for ear, mouth and throat problems  RESP: NEGATIVE for significant cough or SOB  CV: NEGATIVE for chest pain, palpitations or peripheral edema  GI: POSITIVE for Hx GERD  MS: POSITIVE for Hx GOUT  PSYCHIATRIC: NEGATIVE for changes in mood or affect    OBJECTIVE:                                                    /70   Pulse 64   Ht 1.829 m (6')   Wt 83.2 kg (183 lb 6 oz)   BMI 24.87 kg/m    Body mass index is  24.87 kg/m .   GENERAL: healthy, alert, well nourished, well hydrated, no distress  HENT: ear canals and TMs normal, nose and mouth normal without ulcers or lesions  NECK: no tenderness, no adenopathy  RESP: lungs clear to auscultation - no rales, no rhonchi, no wheezes  CV: regular rates and rhythm, normal S1 S2  ABDOMEN: soft, no tenderness  SKIN: Raised scaly tan lesion noted.  PSY: alert, pleasant, mood and affect appropriate.       Uric Acid   Date Value Ref Range Status   05/08/2018 7.4 (H) 3.5 - 7.2 mg/dL Final        ASSESSMENT/PLAN:                                                    (L82.1) Benign keratosis  (primary encounter diagnosis)  Comment: Reviewed the harmless nature of these lesions.  Plan: Can be treated if symptomatic.    (I48.0) Paroxysmal atrial fibrillation (H)  Comment: Appears resolved.  Plan: Monitor.    (Q23.1) Bicuspid aortic valve  Comment: Noted on echocardiogram.  Will follow with serial echocardiograms until stability is documented.  Plan: Echocardiogram Complete        Future order placed.    (K21.9) Gastroesophageal reflux disease, esophagitis presence not specified  Comment: Ongoing symptoms.  Will refer for upper endoscopy.  Plan: GASTROENTEROLOGY ADULT REF PROCEDURE ONLY [facility] [phone]; [facility]         Surgeon      (M1A.9XX0) Chronic gout without tophus, unspecified cause, unspecified site  Comment: Advised hydration, avoiding high-protein foods and alcohol.  Plan: Monitor.  Call with any concerns or questions.    Patient Instructions   *   The spot is a harmless keratosis. Just watch for changes    *    Sorry about the wine.     *    For the bicuspid aortic valve, repeat the echocardiogram in July 2019. Call [phone].     *    Call about about setting up a scope of your stomach. [phone].         Leah Stringer MD  [facility]

## 2018-10-31 NOTE — LETTER
October 31, 2018      Favio Jacinto  [address] [address]        Favio Jacinto is a patient at our clinic. He does NOT need antibiotics prior to dental visits.             Leah Stringer MD

## 2018-10-31 NOTE — PATIENT INSTRUCTIONS
*   The spot is a harmless keratosis. Just watch for changes    *    Sorry about the wine.     *    For the bicuspid aortic valve, repeat the echocardiogram in July 2019. Call [phone].     *    Call about about setting up a scope of your stomach. [phone].

## 2018-11-16 ENCOUNTER — TELEPHONE (OUTPATIENT)
Dept: FAMILY MEDICINE | Facility: CLINIC | Age: 53
End: 2018-11-16

## 2018-11-16 NOTE — TELEPHONE ENCOUNTER
Reason for Call:  Procedure  has reached out to patient to schedule diagnostic upper gi endoscopy    Detailed comments: patient has failed to return calls    Phone Number Patient can be reached at: Home number on file [phone] (home)    Best Time: NA    Can we leave a detailed message on this number? Not Applicable    Call taken on 11/16/2018 at 1:44 PM by Petty Diehl

## 2019-05-29 ENCOUNTER — TELEPHONE (OUTPATIENT)
Dept: FAMILY MEDICINE | Facility: CLINIC | Age: 54
End: 2019-05-29

## 2019-05-29 DIAGNOSIS — M10.9 ACUTE GOUTY ARTHRITIS: Primary | ICD-10-CM

## 2019-05-29 RX ORDER — PREDNISONE 10 MG/1
TABLET ORAL
Status: CANCELLED | OUTPATIENT
Start: 2019-05-29

## 2019-05-29 RX ORDER — PREDNISONE 20 MG/1
40 TABLET ORAL DAILY
Qty: 10 TABLET | Refills: 0 | Status: SHIPPED | OUTPATIENT
Start: 2019-05-29 | End: 2019-08-01

## 2019-05-29 RX ORDER — HYDROCODONE BITARTRATE AND ACETAMINOPHEN 5; 325 MG/1; MG/1
1 TABLET ORAL EVERY 6 HOURS PRN
Qty: 18 TABLET | Refills: 0 | Status: SHIPPED | OUTPATIENT
Start: 2019-05-29 | End: 2019-08-01

## 2019-05-29 NOTE — TELEPHONE ENCOUNTER
"  Called and spoke with patient    Gout  Duration: started last night, came on suddenly   \"knows sx\"    Description   Location: big toe and foot - right  Joint Swelling: YES  Redness: YES  warm to touch   Pain intensity:  moderate  Accompanying signs and symptoms: None  History  Previous history of gout: last flare 2 years   Trauma to the area: no   Precipitating factors:   Alcohol usage: Occassional  Has stopped wine and beer , does eat meat     Diuretic use: no   Recent illness: no   Therapies tried and outcome: None    Uric Acid   Date Value Ref Range Status   05/08/2018 7.4 (H) 3.5 - 7.2 mg/dL Final     Has used prednisone  Burst in past worked well,  [facility]  Advised IBUP 600mg TID for pain     routed to [facility] to review and address   GABRIEL Hutchison RN/Joel Canales         "

## 2019-05-29 NOTE — TELEPHONE ENCOUNTER
I called the patient, it's been 2 years since his last flair. He tried allopurinol, noted side effects. Will treat with a short burst of prednisone and some pain pills. Rx's at our pharmacy.

## 2019-05-29 NOTE — TELEPHONE ENCOUNTER
Pt is calling and states that gout has hit him last night in his right foot and he is looking  For prednisone and something for the pain, please call to triage.     Karen Cronin, [facility]

## 2019-07-12 ENCOUNTER — HOSPITAL ENCOUNTER (OUTPATIENT)
Dept: CARDIOLOGY | Facility: CLINIC | Age: 54
Discharge: HOME | End: 2019-07-12
Attending: FAMILY MEDICINE | Admitting: FAMILY MEDICINE
Payer: COMMERCIAL

## 2019-07-12 DIAGNOSIS — Q23.81 BICUSPID AORTIC VALVE: ICD-10-CM

## 2019-07-12 PROCEDURE — 93306 TTE W/DOPPLER COMPLETE: CPT | Mod: 26 | Performed by: INTERNAL MEDICINE

## 2019-07-12 PROCEDURE — 93306 TTE W/DOPPLER COMPLETE: CPT

## 2019-08-01 ENCOUNTER — TELEPHONE (OUTPATIENT)
Dept: FAMILY MEDICINE | Facility: CLINIC | Age: 54
End: 2019-08-01

## 2019-08-01 ENCOUNTER — OFFICE VISIT (OUTPATIENT)
Dept: FAMILY MEDICINE | Facility: CLINIC | Age: 54
End: 2019-08-01
Payer: COMMERCIAL

## 2019-08-01 VITALS
DIASTOLIC BLOOD PRESSURE: 70 MMHG | BODY MASS INDEX: 25.65 KG/M2 | HEART RATE: 64 BPM | WEIGHT: 189.4 LBS | RESPIRATION RATE: 12 BRPM | HEIGHT: 72 IN | SYSTOLIC BLOOD PRESSURE: 104 MMHG | TEMPERATURE: 98.7 F

## 2019-08-01 DIAGNOSIS — H61.22 IMPACTED CERUMEN OF LEFT EAR: Primary | ICD-10-CM

## 2019-08-01 DIAGNOSIS — H60.392 INFECTIVE OTITIS EXTERNA, LEFT: ICD-10-CM

## 2019-08-01 PROCEDURE — 99213 OFFICE O/P EST LOW 20 MIN: CPT | Performed by: NURSE PRACTITIONER

## 2019-08-01 RX ORDER — NEOMYCIN SULFATE, POLYMYXIN B SULFATE AND HYDROCORTISONE 10; 3.5; 1 MG/ML; MG/ML; [USP'U]/ML
3 SUSPENSION/ DROPS AURICULAR (OTIC) 4 TIMES DAILY
Qty: 10 ML | Refills: 0 | Status: SHIPPED | OUTPATIENT
Start: 2019-08-01 | End: 2019-09-25

## 2019-08-01 ASSESSMENT — MIFFLIN-ST. JEOR: SCORE: 1732.11

## 2019-08-01 ASSESSMENT — PAIN SCALES - GENERAL: PAINLEVEL: NO PAIN (0)

## 2019-08-01 NOTE — TELEPHONE ENCOUNTER
Per [facility] patient needs to be triage before we determine he has to be seen today or it can wait.    Lety Martini [facility]

## 2019-08-01 NOTE — NURSING NOTE
Initial /70 (BP Location: Left arm, Patient Position: Chair, Cuff Size: Adult Large)   Pulse 64   Temp 98.7  F (37.1  C) (Tympanic)   Resp 12   Ht 1.829 m (6')   Wt 85.9 kg (189 lb 6.4 oz)   BMI 25.69 kg/m   Estimated body mass index is 25.69 kg/m  as calculated from the following:    Height as of this encounter: 1.829 m (6').    Weight as of this encounter: 85.9 kg (189 lb 6.4 oz). .    Latrice Mackay CMA ([facility])

## 2019-08-01 NOTE — NURSING NOTE
Left ear was irrigated with warm water and liquid colace, per Deborah Juárez NP, until clean. Patient tolerated procedure well.     Latrice Mackay CMA ([facility])

## 2019-08-01 NOTE — TELEPHONE ENCOUNTER
He will need an appointment with a physician. That is the new policy. Patients must see a physician within 3 months of getting an ear irrigation. Maybe schedule with Elaina? Suyapa Turner RN

## 2019-08-01 NOTE — TELEPHONE ENCOUNTER
"Called and spoke with patient    Mason is a  53 year old male complains of pain in Left ear  for past week worse last week,  No fever or URI symptoms. Has been trying home remedies  Baby oil, hydrogen  oxide   Q tips  \"digging in ear   States left ear completely plugged and right ear decreased hearing   Denies dizzness,or bleeding from ear   Advised appt to ck for possible infection   Made for today   GABRIEL Henson  Clinic  SHAYLA/Joel Canales        "

## 2019-08-01 NOTE — PATIENT INSTRUCTIONS
The 10-year ASCVD risk score (Pooja GARCIA Jr., et al., 2013) is: 3.1%    Values used to calculate the score:      Age: 53 years      Sex: Male      Is Non- : No      Diabetic: No      Tobacco smoker: No      Systolic Blood Pressure: 104 mmHg      Is BP treated: No      HDL Cholesterol: 52 mg/dL      Total Cholesterol: 170 mg/dL      No Q-tips.   Stay active,       The left ear canal is a little red.  - this is probably from the trauma . If the ear become painful  the drops and start to use them

## 2019-08-01 NOTE — PROGRESS NOTES
"Subjective     Favio Jacinto is a 53 year old male who presents to clinic today for the following health issues:    HPI     ENT Symptoms             Symptoms: cc Present Absent Comment   Fever/Chills   x    Fatigue   x    Muscle Aches   x    Eye Irritation   x    Sneezing   x    Nasal Ludwig/Drg   x    Sinus Pressure/Pain   x    Loss of smell   x    Dental pain   x    Sore Throat   x    Swollen Glands  x  Possible swelling today   Ear Pain/Fullness  x  Left ear pain, plugged and decreased/muffled hearing, right ear starting to feel \"funny\" too   Cough   x    Wheeze   x    Chest Pain   x    Shortness of breath   x    Rash   x    Other  x  Acid reflux feeling     Symptom duration:  1 week   Symptom severity:  Not getting better, moderate   Treatments tried:  None - did flush ears with baby oil and peroxide    Contacts:  None       Had been trying wash his ear with  Hydrogen peroxide .  In the shower. But then he used a Q-tip and that plugged up the ear     Patient Active Problem List   Diagnosis     Bicuspid aortic valve     Aortic stenosis     GERD (gastroesophageal reflux disease)     Ex-smoker     CARDIOVASCULAR SCREENING; LDL GOAL LESS THAN 160     Gout, chronic     Polyp of colon, hyperplastic     Advanced directives, counseling/discussion     Atrial fibrillation (H)     Past Surgical History:   Procedure Laterality Date     HC ARTHROTOMY W/OPEN MENISCUS REPAIR       HERNIA REPAIR, INGUINAL RT/LT         Social History     Tobacco Use     Smoking status: Former Smoker     Types: Dip, chew, snus or snuff     Last attempt to quit: 2007     Years since quittin.5     Smokeless tobacco: Former User     Types: Chew   Substance Use Topics     Alcohol use: Yes     Comment: occasional     Family History   Problem Relation Age of Onset     Cancer Father          at 60, smoker lung     Cancer Brother         testicular cancer, doing well. Skin cancer     Other Cancer Brother         Liver and lung     " Respiratory Mother         asthma-uses Advair         No current outpatient medications on file.     Allergies   Allergen Reactions     Penicillins      BP Readings from Last 3 Encounters:   08/01/19 104/70   10/31/18 118/70   05/08/18 112/74    Wt Readings from Last 3 Encounters:   08/01/19 85.9 kg (189 lb 6.4 oz)   10/31/18 83.2 kg (183 lb 6 oz)   05/08/18 84.9 kg (187 lb 2 oz)                      Reviewed and updated as needed this visit by Provider  Tobacco  Allergies  Meds  Med Hx  Surg Hx  Fam Hx  Soc Hx        Review of Systems   ROS COMP: CONSTITUTIONAL: NEGATIVE for fever, chills, change in weight  ENT/MOUTH: POSITIVE for ear pain left,  The left ear is completely plugged and the right ear is starting to feel the same way   RESP: NEGATIVE for significant cough or SOB  CV: NEGATIVE for chest pain, palpitations or peripheral edema      Objective    /70 (BP Location: Left arm, Patient Position: Chair, Cuff Size: Adult Large)   Pulse 64   Temp 98.7  F (37.1  C) (Tympanic)   Resp 12   Ht 1.829 m (6')   Wt 85.9 kg (189 lb 6.4 oz)   BMI 25.69 kg/m    Body mass index is 25.69 kg/m .  Physical Exam   GENERAL: healthy, alert and no distress  HENT: right ear: clear effusion and moderate  Cerumen in the ear that was easily removed , left ear: occluded with wax, was removed with irrigation , there is moderate amount  Of  Fluid behind the TM,  This should clear. ,  The ear canal does have an abrasion at  6 o'clock, but is not painful nose and mouth without ulcers or lesions, oropharynx clear, oral mucous membranes moist and sinuses: not tender  NECK: no adenopathy, no asymmetry, masses, or scars and thyroid normal to palpation  RESP: lungs clear to auscultation - no rales, rhonchi or wheezes  CV: regular rate and rhythm, normal S1 S2, no S3 or S4, no murmur, click or rub, no peripheral edema and peripheral pulses strong    Diagnostic Test Results:  Labs reviewed in Epic, cholesterol is good.   none        ASSESSMENT/PLAN:      ICD-10-CM    1. Impacted cerumen of left ear H61.22    2. Infective otitis externa, left H60.392 neomycin-polymyxin-hydrocortisone (CORTISPORIN) 3.5-06852-4 otic suspension       Patient Instructions   The 10-year ASCVD risk score (Pooja GARCIA Jr., et al., 2013) is: 3.1%    Values used to calculate the score:      Age: 53 years      Sex: Male      Is Non- : No      Diabetic: No      Tobacco smoker: No      Systolic Blood Pressure: 104 mmHg      Is BP treated: No      HDL Cholesterol: 52 mg/dL      Total Cholesterol: 170 mg/dL      No Q-tips.   Stay active,       The left ear canal is a little red.  - this is probably from the trauma . If the ear become painful  the drops and start to use them

## 2019-08-01 NOTE — TELEPHONE ENCOUNTER
Patient called and LM that he would like his ears cleaned out.  He has issues with them being plugged.    Lety Martini, [facility]

## 2019-09-13 ENCOUNTER — TELEPHONE (OUTPATIENT)
Dept: FAMILY MEDICINE | Facility: CLINIC | Age: 54
End: 2019-09-13

## 2019-09-13 NOTE — TELEPHONE ENCOUNTER
"Patient requesting order from [facility] to \"get his throat scoped.\"     Yuli Domínguez, [facility] [place]    "

## 2019-09-13 NOTE — TELEPHONE ENCOUNTER
Could you contact the patient regarding this?  I'm not sure if he's referring to an EGD or nasolaryngoscopy.  Thank you.

## 2019-09-16 NOTE — TELEPHONE ENCOUNTER
"Spoke with  Pt  He has GERD but does not take anything for it, he just thought that he wanted to have his \"stomach\" looked at again  Apparently has done abut 15 years ago and thought is should just be done, discussed since no change in sx and not taking medication , advised appt to assess sx and need for correct imaging   appt made next week   PRachel Hutchison  RN/Joel Canales    "

## 2019-09-25 ENCOUNTER — OFFICE VISIT (OUTPATIENT)
Dept: FAMILY MEDICINE | Facility: CLINIC | Age: 54
End: 2019-09-25
Payer: COMMERCIAL

## 2019-09-25 VITALS
WEIGHT: 188.38 LBS | BODY MASS INDEX: 25.55 KG/M2 | DIASTOLIC BLOOD PRESSURE: 70 MMHG | TEMPERATURE: 97.7 F | SYSTOLIC BLOOD PRESSURE: 100 MMHG | HEART RATE: 62 BPM | RESPIRATION RATE: 12 BRPM

## 2019-09-25 DIAGNOSIS — R10.13 EPIGASTRIC PAIN: Primary | ICD-10-CM

## 2019-09-25 DIAGNOSIS — M1A.9XX0 CHRONIC GOUT WITHOUT TOPHUS, UNSPECIFIED CAUSE, UNSPECIFIED SITE: ICD-10-CM

## 2019-09-25 DIAGNOSIS — I48.0 PAROXYSMAL ATRIAL FIBRILLATION (H): ICD-10-CM

## 2019-09-25 LAB
ALBUMIN SERPL-MCNC: 4.1 G/DL (ref 3.4–5)
ALP SERPL-CCNC: 53 U/L (ref 40–150)
ALT SERPL W P-5'-P-CCNC: 23 U/L (ref 0–70)
ANION GAP SERPL CALCULATED.3IONS-SCNC: 5 MMOL/L (ref 3–14)
AST SERPL W P-5'-P-CCNC: 21 U/L (ref 0–45)
BILIRUB SERPL-MCNC: 0.3 MG/DL (ref 0.2–1.3)
BUN SERPL-MCNC: 18 MG/DL (ref 7–30)
CALCIUM SERPL-MCNC: 8.7 MG/DL (ref 8.5–10.1)
CHLORIDE SERPL-SCNC: 104 MMOL/L (ref 94–109)
CO2 SERPL-SCNC: 25 MMOL/L (ref 20–32)
CREAT SERPL-MCNC: 0.99 MG/DL (ref 0.66–1.25)
ERYTHROCYTE [DISTWIDTH] IN BLOOD BY AUTOMATED COUNT: 12.1 % (ref 10–15)
GFR SERPL CREATININE-BSD FRML MDRD: 84 ML/MIN/{1.73_M2}
GLUCOSE SERPL-MCNC: 99 MG/DL (ref 70–99)
HCT VFR BLD AUTO: 44.4 % (ref 40–53)
HGB BLD-MCNC: 15.1 G/DL (ref 13.3–17.7)
LIPASE SERPL-CCNC: 262 U/L (ref 73–393)
MCH RBC QN AUTO: 30.6 PG (ref 26.5–33)
MCHC RBC AUTO-ENTMCNC: 34 G/DL (ref 31.5–36.5)
MCV RBC AUTO: 90 FL (ref 78–100)
PLATELET # BLD AUTO: 217 10E9/L (ref 150–450)
POTASSIUM SERPL-SCNC: 3.8 MMOL/L (ref 3.4–5.3)
PROT SERPL-MCNC: 7.5 G/DL (ref 6.8–8.8)
RBC # BLD AUTO: 4.93 10E12/L (ref 4.4–5.9)
SODIUM SERPL-SCNC: 134 MMOL/L (ref 133–144)
URATE SERPL-MCNC: 8.8 MG/DL (ref 3.5–7.2)
WBC # BLD AUTO: 6.9 10E9/L (ref 4–11)

## 2019-09-25 PROCEDURE — 80053 COMPREHEN METABOLIC PANEL: CPT | Performed by: FAMILY MEDICINE

## 2019-09-25 PROCEDURE — 99214 OFFICE O/P EST MOD 30 MIN: CPT | Performed by: FAMILY MEDICINE

## 2019-09-25 PROCEDURE — 84550 ASSAY OF BLOOD/URIC ACID: CPT | Performed by: FAMILY MEDICINE

## 2019-09-25 PROCEDURE — 36415 COLL VENOUS BLD VENIPUNCTURE: CPT | Performed by: FAMILY MEDICINE

## 2019-09-25 PROCEDURE — 83690 ASSAY OF LIPASE: CPT | Performed by: FAMILY MEDICINE

## 2019-09-25 PROCEDURE — 85027 COMPLETE CBC AUTOMATED: CPT | Performed by: FAMILY MEDICINE

## 2019-09-25 ASSESSMENT — PAIN SCALES - GENERAL: PAINLEVEL: NO PAIN (1)

## 2019-09-25 NOTE — PROGRESS NOTES
"Subjective     Favio Jacinto is a 53 year old male who presents to clinic today for the following health issues:    HPI     GERD  Patient reports he is not on any current medications for this. He does have burning in his throat when this happens. No chest pain or feelings that food gets stuck. He is unaware of any foods that flare this up more. He would like to discuss getting referral for upper GI endoscopy.    Abdominal Pain LLQ  Patient reports left lower abdomen pain x years which has worsened over the past 6 months. Describes pain as an intermittent \"cramping feeling\". No lumps or masses, no urinary sx.       Reviewed and updated as needed this visit by Provider         Review of Systems   ROS COMP: CONSTITUTIONAL:NEGATIVE for fever, chills, change in weight  INTEGUMENTARY/SKIN: POSITIVE for lesion on right lower extremity  RESP:NEGATIVE for significant cough or SOB  CV: NEGATIVE for chest pain   GI: POSITIVE for abdominal pain LLQ, gas or bloating and heartburn or reflux  MUSCULOSKELETAL: NEGATIVE for significant arthralgias or myalgia  PSYCHIATRIC: NEGATIVE for changes in mood or affect        Objective    /70   Pulse 62   Temp 97.7  F (36.5  C) (Tympanic)   Resp 12   Wt 85.4 kg (188 lb 6 oz)   BMI 25.55 kg/m    Body mass index is 25.55 kg/m .  Physical Exam   GENERAL: alert, pleasant and no distress  RESP: lungs clear to auscultation - no rales, rhonchi or wheezes  CV: regular rate and rhythm, normal S1 S2  ABDOMEN: soft, nontender  MS: no gross musculoskeletal defects noted, no edema  SKIN: 1 cm raised tan scaly lesion, anterior right lower thigh, no ulceration, confluent in color  PSYCH: mentation appears normal, affect normal/bright    Diagnostic Test Results:    Results for orders placed or performed in visit on 09/25/19   Comprehensive metabolic panel (BMP + Alb, Alk Phos, ALT, AST, Total. Bili, TP)   Result Value Ref Range    Sodium 134 133 - 144 mmol/L    Potassium 3.8 3.4 - 5.3 mmol/L    " Chloride 104 94 - 109 mmol/L    Carbon Dioxide 25 20 - 32 mmol/L    Anion Gap 5 3 - 14 mmol/L    Glucose 99 70 - 99 mg/dL    Urea Nitrogen 18 7 - 30 mg/dL    Creatinine 0.99 0.66 - 1.25 mg/dL    GFR Estimate 84 >60 mL/min/[1.73_m2]    GFR Estimate If Black >90 >60 mL/min/[1.73_m2]    Calcium 8.7 8.5 - 10.1 mg/dL    Bilirubin Total 0.3 0.2 - 1.3 mg/dL    Albumin 4.1 3.4 - 5.0 g/dL    Protein Total 7.5 6.8 - 8.8 g/dL    Alkaline Phosphatase 53 40 - 150 U/L    ALT 23 0 - 70 U/L    AST 21 0 - 45 U/L   Lipase   Result Value Ref Range    Lipase 262 73 - 393 U/L   CBC with platelets   Result Value Ref Range    WBC 6.9 4.0 - 11.0 10e9/L    RBC Count 4.93 4.4 - 5.9 10e12/L    Hemoglobin 15.1 13.3 - 17.7 g/dL    Hematocrit 44.4 40.0 - 53.0 %    MCV 90 78 - 100 fl    MCH 30.6 26.5 - 33.0 pg    MCHC 34.0 31.5 - 36.5 g/dL    RDW 12.1 10.0 - 15.0 %    Platelet Count 217 150 - 450 10e9/L   Uric acid   Result Value Ref Range    Uric Acid 8.8 (H) 3.5 - 7.2 mg/dL           Assessment & Plan     (R10.13) Epigastric pain  (primary encounter diagnosis)  Comment: Etiology unclear, will refer for upper endoscopy.  Plan: GASTROENTEROLOGY ADULT REF PROCEDURE ONLY [facility] [phone], Comprehensive         metabolic panel (BMP + Alb, Alk Phos, ALT, AST,        Total. Bili, TP), Lipase, CBC with platelets,         ranitidine (ZANTAC) 150 MG tablet      (I48.0) Paroxysmal atrial fibrillation (H)  Comment: Appears resolved.  Plan:  no strong indication for anticoagulation.    (M1A.9XX0) Chronic gout without tophus, unspecified cause, unspecified site  Comment: Uric acid above recommended guidelines of 6.0. Discussed preventative medication, deferred per patient.   Plan: Uric acid        Monitor.    Patient Instructions   *    Call about setting up a scope of your stomach:  [phone].     *    Blood tests for liver and pancreas.     *    Sounds like acid reflux. Take a medication called Rantidine twice daily for one month.      *     See our dermatologist: [phone].     *    The spot on your leg is okay.      BMI:   Estimated body mass index is 25.55 kg/m  as calculated from the following:    Height as of 8/1/19: 1.829 m (6').    Weight as of this encounter: 85.4 kg (188 lb 6 oz).         Return in about 1 week (around 10/2/2019).    Leah Stringer MD  [facility]

## 2019-09-25 NOTE — PATIENT INSTRUCTIONS
*   Call about setting up a scope of your stomach:  [phone].     *    Blood tests for liver and pancreas.     *    Sounds like acid reflux. Take a medication called Rantidine twice daily for one month.     *     See our dermatologist: [phone].     *    The spot on your leg is okay.

## 2019-09-25 NOTE — LETTER
September 30, 2019      Favio Jacinto  [address] [facility] [address]        Dear ,    We are writing to inform you of your test results.    Your recent blood test show that you have normal kidney and liver function.  There is no signs of inflammation to your pancreas or infection.  Nothing here to explain any abdominal pain.  Also, your uric acid level, the crystals that causes gout, is elevated.  Taking a daily medication to reduce your uric acid levels may help prevent gout attacks.  Let me know if he like to consider this option.  As we talked about, I would recommend a scope of your stomach to further evaluate your abdominal pain.  Please call, or use [employer], with any questions or concerns.     Resulted Orders   Comprehensive metabolic panel (BMP + Alb, Alk Phos, ALT, AST, Total. Bili, TP)   Result Value Ref Range    Sodium 134 133 - 144 mmol/L    Potassium 3.8 3.4 - 5.3 mmol/L    Chloride 104 94 - 109 mmol/L    Carbon Dioxide 25 20 - 32 mmol/L    Anion Gap 5 3 - 14 mmol/L    Glucose 99 70 - 99 mg/dL      Comment:      Non Fasting    Urea Nitrogen 18 7 - 30 mg/dL    Creatinine 0.99 0.66 - 1.25 mg/dL    GFR Estimate 84 >60 mL/min/[1.73_m2]      Comment:      Non  GFR Calc  Starting 12/18/2018, serum creatinine based estimated GFR (eGFR) will be   calculated using the Chronic Kidney Disease Epidemiology Collaboration   (CKD-EPI) equation.      GFR Estimate If Black >90 >60 mL/min/[1.73_m2]      Comment:       GFR Calc  Starting 12/18/2018, serum creatinine based estimated GFR (eGFR) will be   calculated using the Chronic Kidney Disease Epidemiology Collaboration   (CKD-EPI) equation.      Calcium 8.7 8.5 - 10.1 mg/dL    Bilirubin Total 0.3 0.2 - 1.3 mg/dL    Albumin 4.1 3.4 - 5.0 g/dL    Protein Total 7.5 6.8 - 8.8 g/dL    Alkaline Phosphatase 53 40 - 150 U/L    ALT 23 0 - 70 U/L    AST 21 0 - 45 U/L   Lipase   Result Value Ref Range    Lipase 262 73 -  393 U/L   CBC with platelets   Result Value Ref Range    WBC 6.9 4.0 - 11.0 10e9/L    RBC Count 4.93 4.4 - 5.9 10e12/L    Hemoglobin 15.1 13.3 - 17.7 g/dL    Hematocrit 44.4 40.0 - 53.0 %    MCV 90 78 - 100 fl    MCH 30.6 26.5 - 33.0 pg    MCHC 34.0 31.5 - 36.5 g/dL    RDW 12.1 10.0 - 15.0 %    Platelet Count 217 150 - 450 10e9/L   Uric acid   Result Value Ref Range    Uric Acid 8.8 (H) 3.5 - 7.2 mg/dL       If you have any questions or concerns, please call the clinic at the number listed above.       Sincerely,        Leah Stringer MD/am

## 2019-10-01 ENCOUNTER — ANESTHESIA EVENT (OUTPATIENT)
Dept: GASTROENTEROLOGY | Facility: CLINIC | Age: 54
End: 2019-10-01
Payer: COMMERCIAL

## 2019-10-01 ASSESSMENT — ENCOUNTER SYMPTOMS: DYSRHYTHMIAS: 1

## 2019-10-01 ASSESSMENT — LIFESTYLE VARIABLES: TOBACCO_USE: 1

## 2019-10-01 NOTE — ANESTHESIA PREPROCEDURE EVALUATION
Anesthesia Pre-Procedure Evaluation    Patient: Favio Jacinto   MRN: [id number] : 1965          Preoperative Diagnosis: * No pre-op diagnosis entered *    Procedure(s):  ESOPHAGOGASTRODUODENOSCOPY (EGD)    Past Medical History:   Diagnosis Date     Sinusitis      Past Surgical History:   Procedure Laterality Date     HC ARTHROTOMY W/OPEN MENISCUS REPAIR       HERNIA REPAIR, INGUINAL RT/LT         Anesthesia Evaluation     . Pt has had prior anesthetic. Type: General and MAC    No history of anesthetic complications          ROS/MED HX    ENT/Pulmonary:     (+)tobacco use, Past use , . .    Neurologic:  - neg neurologic ROS     Cardiovascular:     (+) ----. : . . . :. dysrhythmias a-fib, valvular problems/murmurs (Bicuspid aortic valve) type: AS . Previous cardiac testing Echodate:[email or web address]:Interpretation Summary    Bicuspid aortic valve with a complete LCC/RCC raphe.  Moderate valvular aortic stenosis. The degree of aortic stenosis appears  similar to 2018.  The visual ejection fraction is estimated at 55-60%.  Left ventricular systolic function is normal.  The ascending aorta is Mildly dilated.    date: results: date: results: date: results:          METS/Exercise Tolerance:  >4 METS   Hematologic:  - neg hematologic  ROS       Musculoskeletal:  - neg musculoskeletal ROS (+)  other musculoskeletal- Chronic gout      GI/Hepatic:     (+) GERD       Renal/Genitourinary:  - ROS Renal section negative       Endo:  - neg endo ROS       Psychiatric:  - neg psychiatric ROS       Infectious Disease:  - neg infectious disease ROS       Malignancy:      - no malignancy   Other:    - neg other ROS                      Physical Exam  Normal systems: cardiovascular, pulmonary and dental    Airway   Mallampati: I  TM distance: >3 FB  Neck ROM: full    Dental     Cardiovascular   Rhythm and rate: regular and normal      Pulmonary    breath sounds clear to auscultation            Lab Results   Component Value Date     WBC 6.9 09/25/2019    HGB 15.1 09/25/2019    HCT 44.4 09/25/2019     09/25/2019     09/25/2019    POTASSIUM 3.8 09/25/2019    CHLORIDE 104 09/25/2019    CO2 25 09/25/2019    BUN 18 09/25/2019    CR 0.99 09/25/2019    GLC 99 09/25/2019    GRACE 8.7 09/25/2019    ALBUMIN 4.1 09/25/2019    PROTTOTAL 7.5 09/25/2019    ALT 23 09/25/2019    AST 21 09/25/2019    ALKPHOS 53 09/25/2019    BILITOTAL 0.3 09/25/2019    LIPASE 262 09/25/2019    PTT 26 04/12/2010    INR 0.9 08/13/2014    TSH 2.0 08/12/2014       Preop Vitals  BP Readings from Last 3 Encounters:   09/25/19 100/70   08/01/19 104/70   10/31/18 118/70    Pulse Readings from Last 3 Encounters:   09/25/19 62   08/01/19 64   10/31/18 64      Resp Readings from Last 3 Encounters:   09/25/19 12   08/01/19 12   08/11/14 16    SpO2 Readings from Last 3 Encounters:   09/13/16 100%   08/11/14 99%   07/03/14 98%      Temp Readings from Last 1 Encounters:   09/25/19 36.5  C (97.7  F) (Tympanic)    Ht Readings from Last 1 Encounters:   08/01/19 1.829 m (6')      Wt Readings from Last 1 Encounters:   09/25/19 85.4 kg (188 lb 6 oz)    Estimated body mass index is 25.55 kg/m  as calculated from the following:    Height as of 8/1/19: 1.829 m (6').    Weight as of 9/25/19: 85.4 kg (188 lb 6 oz).       Anesthesia Plan      History & Physical Review  History and physical reviewed and following examination; no interval change.    ASA Status:  2 .    NPO Status:  > 8 hours    Plan for MAC Reason for MAC:  Procedure to face, neck, head or breast and Deep or markedly invasive procedure (G8)         Postoperative Care      Consents  Anesthetic plan, risks, benefits and alternatives discussed with:  Patient..                 Barry Ribera

## 2019-10-03 ENCOUNTER — HOSPITAL ENCOUNTER (OUTPATIENT)
Facility: CLINIC | Age: 54
Discharge: HOME | End: 2019-10-03
Attending: SURGERY | Admitting: SURGERY
Payer: COMMERCIAL

## 2019-10-03 ENCOUNTER — ANESTHESIA (OUTPATIENT)
Dept: GASTROENTEROLOGY | Facility: CLINIC | Age: 54
End: 2019-10-03
Payer: COMMERCIAL

## 2019-10-03 VITALS
BODY MASS INDEX: 25.47 KG/M2 | SYSTOLIC BLOOD PRESSURE: 100 MMHG | HEIGHT: 72 IN | RESPIRATION RATE: 1 BRPM | DIASTOLIC BLOOD PRESSURE: 72 MMHG | OXYGEN SATURATION: 98 % | WEIGHT: 188 LBS | TEMPERATURE: 98.3 F

## 2019-10-03 LAB — UPPER GI ENDOSCOPY: NORMAL

## 2019-10-03 PROCEDURE — 25000125 ZZHC RX 250: Performed by: SURGERY

## 2019-10-03 PROCEDURE — 25000128 H RX IP 250 OP 636: Performed by: NURSE ANESTHETIST, CERTIFIED REGISTERED

## 2019-10-03 PROCEDURE — 88305 TISSUE EXAM BY PATHOLOGIST: CPT | Mod: 26 | Performed by: SURGERY

## 2019-10-03 PROCEDURE — 37000008 ZZH ANESTHESIA TECHNICAL FEE, 1ST 30 MIN: Performed by: SURGERY

## 2019-10-03 PROCEDURE — 88305 TISSUE EXAM BY PATHOLOGIST: CPT | Performed by: SURGERY

## 2019-10-03 PROCEDURE — 25000125 ZZHC RX 250: Performed by: NURSE ANESTHETIST, CERTIFIED REGISTERED

## 2019-10-03 PROCEDURE — 43239 EGD BIOPSY SINGLE/MULTIPLE: CPT | Performed by: SURGERY

## 2019-10-03 PROCEDURE — 25800030 ZZH RX IP 258 OP 636: Performed by: SURGERY

## 2019-10-03 RX ORDER — SODIUM CHLORIDE, SODIUM LACTATE, POTASSIUM CHLORIDE, CALCIUM CHLORIDE 600; 310; 30; 20 MG/100ML; MG/100ML; MG/100ML; MG/100ML
INJECTION, SOLUTION INTRAVENOUS CONTINUOUS
Status: DISCONTINUED | OUTPATIENT
Start: 2019-10-03 | End: 2019-10-03 | Stop reason: HOSPADM

## 2019-10-03 RX ORDER — LIDOCAINE 40 MG/G
CREAM TOPICAL
Status: DISCONTINUED | OUTPATIENT
Start: 2019-10-03 | End: 2019-10-03 | Stop reason: HOSPADM

## 2019-10-03 RX ORDER — LIDOCAINE HYDROCHLORIDE 10 MG/ML
INJECTION, SOLUTION INFILTRATION; PERINEURAL PRN
Status: DISCONTINUED | OUTPATIENT
Start: 2019-10-03 | End: 2019-10-03

## 2019-10-03 RX ORDER — ONDANSETRON 2 MG/ML
4 INJECTION INTRAMUSCULAR; INTRAVENOUS
Status: DISCONTINUED | OUTPATIENT
Start: 2019-10-03 | End: 2019-10-03 | Stop reason: HOSPADM

## 2019-10-03 RX ORDER — PROPOFOL 10 MG/ML
INJECTION, EMULSION INTRAVENOUS CONTINUOUS PRN
Status: DISCONTINUED | OUTPATIENT
Start: 2019-10-03 | End: 2019-10-03

## 2019-10-03 RX ORDER — PROPOFOL 10 MG/ML
INJECTION, EMULSION INTRAVENOUS PRN
Status: DISCONTINUED | OUTPATIENT
Start: 2019-10-03 | End: 2019-10-03

## 2019-10-03 RX ADMIN — PROPOFOL 100 MG: 10 INJECTION, EMULSION INTRAVENOUS at 09:36

## 2019-10-03 RX ADMIN — SODIUM CHLORIDE, POTASSIUM CHLORIDE, SODIUM LACTATE AND CALCIUM CHLORIDE 1000 ML: 600; 310; 30; 20 INJECTION, SOLUTION INTRAVENOUS at 08:23

## 2019-10-03 RX ADMIN — PROPOFOL 100 MCG/KG/MIN: 10 INJECTION, EMULSION INTRAVENOUS at 09:38

## 2019-10-03 RX ADMIN — LIDOCAINE HYDROCHLORIDE 50 MG: 10 INJECTION, SOLUTION INFILTRATION; PERINEURAL at 09:36

## 2019-10-03 ASSESSMENT — MIFFLIN-ST. JEOR: SCORE: 1725.76

## 2019-10-03 NOTE — H&P
53 year old year old male here for upper endoscopy for epigastric pain        Patient Active Problem List   Diagnosis     Bicuspid aortic valve     Aortic stenosis     GERD (gastroesophageal reflux disease)     Ex-smoker     CARDIOVASCULAR SCREENING; LDL GOAL LESS THAN 160     Gout, chronic     Polyp of colon, hyperplastic     Advanced directives, counseling/discussion     Atrial fibrillation (H)       Past Medical History:   Diagnosis Date     Sinusitis        Past Surgical History:   Procedure Laterality Date     HC ARTHROTOMY W/OPEN MENISCUS REPAIR       HERNIA REPAIR, INGUINAL RT/LT         Family History   Problem Relation Age of Onset     Cancer Father          at 60, smoker lung     Cancer Brother         testicular cancer, doing well. Skin cancer     Other Cancer Brother         Liver and lung     Respiratory Mother         asthma-uses Advair       No current outpatient medications on file.       Allergies   Allergen Reactions     Penicillins        Pt reports that he quit smoking about 12 years ago. His smoking use included dip, chew, snus or snuff. He has quit using smokeless tobacco.  His smokeless tobacco use included chew. He reports current alcohol use. He reports that he does not use drugs.    Exam:    Awake, Alert OX3  Lungs - CTA bilaterally  CV - RRR, no murmurs, distal pulses intact  Abd - soft, non-distended, non-tender, +BS  Extr - No cyanosis or edema    A/P 53 year old year old male in need of upper endoscopy for epigastric pain. Risks, benefits, alternatives, and complications were discussed including the possibility of perforation and the patient agreed to proceed.    Favio Devries MD

## 2019-10-03 NOTE — ANESTHESIA POSTPROCEDURE EVALUATION
Patient: Favio Jacinto    Procedure(s):  ESOPHAGOGASTRODUODENOSCOPY, WITH BIOPSY    Diagnosis:* No pre-op diagnosis entered *  Diagnosis Additional Information: No value filed.    Anesthesia Type:  MAC    Note:  Anesthesia Post Evaluation    Patient location during evaluation: Phase 2 and Bedside  Patient participation: Able to fully participate in evaluation  Level of consciousness: awake and alert  Pain management: adequate  Airway patency: patent  Cardiovascular status: acceptable  Respiratory status: acceptable  Hydration status: acceptable  PONV: none     Anesthetic complications: None          Last vitals:  Vitals:    10/03/19 0755 10/03/19 0945 10/03/19 0959   BP: 120/81 100/67 100/67   Resp: 16 14 16   Temp: 36.8  C (98.3  F)     SpO2: 99%  96%         Electronically Signed By: Levy Posey CRNA, APRN CRNA  October 3, 2019  10:09 AM

## 2019-10-03 NOTE — ANESTHESIA CARE TRANSFER NOTE
Patient: Favio Jacinto    Procedure(s):  ESOPHAGOGASTRODUODENOSCOPY, WITH BIOPSY    Diagnosis: * No pre-op diagnosis entered *  Diagnosis Additional Information: No value filed.    Anesthesia Type:   MAC     Note:  Airway :Nasal Cannula  Patient transferred to:Phase II  Handoff Report: Identifed the Patient, Identified the Reponsible Provider, Reviewed the pertinent medical history, Discussed the surgical course, Reviewed Intra-OP anesthesia mangement and issues during anesthesia, Set expectations for post-procedure period and Allowed opportunity for questions and acknowledgement of understanding      Vitals: (Last set prior to Anesthesia Care Transfer)    CRNA VITALS  10/3/2019 0915 - 10/3/2019 0945      10/3/2019             Pulse:  62    Ht Rate:  61    SpO2:  100 %                Electronically Signed By: Levy Posey CRNA, APRN CRNA  October 3, 2019  9:45 AM

## 2019-10-07 LAB — COPATH REPORT: NORMAL

## 2019-11-02 ENCOUNTER — HEALTH MAINTENANCE LETTER (OUTPATIENT)
Age: 54
End: 2019-11-02

## 2020-05-22 ENCOUNTER — NURSE TRIAGE (OUTPATIENT)
Dept: NURSING | Facility: CLINIC | Age: 55
End: 2020-05-22

## 2020-05-22 DIAGNOSIS — M10.9 ACUTE GOUTY ARTHRITIS: Primary | ICD-10-CM

## 2020-05-22 RX ORDER — HYDROCODONE BITARTRATE AND ACETAMINOPHEN 5; 325 MG/1; MG/1
1 TABLET ORAL EVERY 6 HOURS PRN
Qty: 10 TABLET | Refills: 0 | Status: SHIPPED | OUTPATIENT
Start: 2020-05-22 | End: 2020-08-04

## 2020-05-22 RX ORDER — PREDNISONE 20 MG/1
40 TABLET ORAL DAILY
Qty: 10 TABLET | Refills: 0 | Status: SHIPPED | OUTPATIENT
Start: 2020-05-22 | End: 2020-08-04

## 2020-05-22 NOTE — TELEPHONE ENCOUNTER
"Clinic Action Needed:Yes, patient call back .  Reason for Call:Patient calling requesting to leave a message for Dr Stringer to request refill of steroid and pain medication for gout. Patient reporting Dr Stringer was familiar with recurrent symptoms.  \"I deal with gout every 2 years.\" Symptoms starting 5/21/20 with swollen great toe. Toe is red with \"severe pain.\" Afebrile.   Last telephone encounter related to gout 8/28/2017.  Caller verbalized understanding. Denies further questions.    Sharmaine Sánchez RN  [facility]          Additional Information    Negative: Foot is cool or blue in comparison to other foot    Negative: Purple or black skin on toe  (Exception: simple recalled injury with bruise)    Negative: [1] Looks infected (e.g., spreading redness, red streak, pus) AND [2] fever    Negative: Patient sounds very sick or weak to the triager    [1] SEVERE pain (e.g., excruciating, unable to do any normal activities) AND [2] not improved after 2 hours of pain medicine    Protocols used: TOE PAIN-A-AH      "

## 2020-05-22 NOTE — TELEPHONE ENCOUNTER
Pt treated for gout flare over the phone 5/2019 as well.  Has had 2 clinic visits within the last year.    Please confirm that this is a typical flare for him.  No fevers/chills    Script for 5 day burst of prednisone and some pain medication sent to pharmacy consistent with previous treatment.    Fatuma Urena DO

## 2020-05-22 NOTE — TELEPHONE ENCOUNTER
Patient reports that this is a typical flare. He has not had one in about 2 years. He has changed his diet to help with the flare. Informed patient of medications that were sent.  Vanessa Roberto RN

## 2020-08-03 ENCOUNTER — TELEPHONE (OUTPATIENT)
Dept: FAMILY MEDICINE | Facility: CLINIC | Age: 55
End: 2020-08-03

## 2020-08-03 NOTE — TELEPHONE ENCOUNTER
Reason for Call:  Other prescription    Detailed comments: Pt is calling and stating he has a flare up of gout on left foot ankle and looking for refills of Prednesone, oxycodone?  [facility]    Phone Number Patient can be reached at: Cell number on file:    Telephone Information:   Mobile [phone]       Best Time: any    Can we leave a detailed message on this number? YES    Call taken on 8/3/2020 at 9:19 AM by Laxmi Carmona

## 2020-08-04 ENCOUNTER — OFFICE VISIT (OUTPATIENT)
Dept: FAMILY MEDICINE | Facility: CLINIC | Age: 55
End: 2020-08-04
Payer: COMMERCIAL

## 2020-08-04 VITALS
SYSTOLIC BLOOD PRESSURE: 147 MMHG | HEART RATE: 90 BPM | TEMPERATURE: 98.6 F | HEIGHT: 72 IN | RESPIRATION RATE: 15 BRPM | BODY MASS INDEX: 25.47 KG/M2 | WEIGHT: 188 LBS | DIASTOLIC BLOOD PRESSURE: 92 MMHG

## 2020-08-04 DIAGNOSIS — M10.9 ACUTE GOUTY ARTHRITIS: ICD-10-CM

## 2020-08-04 PROCEDURE — 99214 OFFICE O/P EST MOD 30 MIN: CPT | Performed by: PHYSICIAN ASSISTANT

## 2020-08-04 RX ORDER — PREDNISONE 20 MG/1
40 TABLET ORAL DAILY
Qty: 10 TABLET | Refills: 0 | Status: SHIPPED | OUTPATIENT
Start: 2020-08-04 | End: 2020-08-13

## 2020-08-04 RX ORDER — HYDROCODONE BITARTRATE AND ACETAMINOPHEN 5; 325 MG/1; MG/1
1 TABLET ORAL EVERY 6 HOURS PRN
Qty: 10 TABLET | Refills: 0 | Status: SHIPPED | OUTPATIENT
Start: 2020-08-04 | End: 2020-08-13

## 2020-08-04 ASSESSMENT — PAIN SCALES - GENERAL: PAINLEVEL: EXTREME PAIN (8)

## 2020-08-04 ASSESSMENT — MIFFLIN-ST. JEOR: SCORE: 1720.76

## 2020-08-04 NOTE — TELEPHONE ENCOUNTER
Please schedule either a video visit or office visit for this (given the degree of pain, new location).  Again, ok to double book this today (now/whenever)  Reba Cooper PA-C

## 2020-08-04 NOTE — TELEPHONE ENCOUNTER
Called pt back and have set up an appt for him to be seen today at 1:20 in clinic with ana .     Karen Cronin, Station

## 2020-08-04 NOTE — PROGRESS NOTES
Subjective     Favio Jacinto is a 54 year old male who presents to clinic today for the following health issues:    HPI       RN triage note:     Spoke with patient reports the swelling of back of left ankle and heal started 3 days ago.  Reports increased pain 10/10 last night, difficulty weight bearing  Reports this is a new location for him to get gout.  Denies fever or chills     Dr. Urena last prescribed prednisone and Norco on 20     Ltey Bowen RN            Has had gout in the past, however typically affects his great toe.  This time the pain is in the heel.   Pain started suddenly 2 days ago out of the blue (however he did admit to a weekend of heavy drinking and eating a lot of fish). No fever, chills or sweats.     He has tried allopurinol in the past, however when he started this, it flared his gout severely and he is not interested in trying this again.  He did not take preventative medication while starting allopurinol.  He gets about 1 flare up per year.             Patient Active Problem List   Diagnosis     Bicuspid aortic valve     Aortic stenosis     GERD (gastroesophageal reflux disease)     Ex-smoker     CARDIOVASCULAR SCREENING; LDL GOAL LESS THAN 160     Gout, chronic     Polyp of colon, hyperplastic     Advanced directives, counseling/discussion     Atrial fibrillation (H)     Past Surgical History:   Procedure Laterality Date     ESOPHAGOSCOPY, GASTROSCOPY, DUODENOSCOPY (EGD), COMBINED N/A 10/3/2019    Procedure: ESOPHAGOGASTRODUODENOSCOPY, WITH BIOPSY;  Surgeon: Favio Devries MD;  Location: [facility]     HC ARTHROTOMY W/OPEN MENISCUS REPAIR       HERNIA REPAIR, INGUINAL RT/LT         Social History     Tobacco Use     Smoking status: Former Smoker     Types: Dip, chew, snus or snuff     Last attempt to quit: 2007     Years since quittin.6     Smokeless tobacco: Former User     Types: Chew   Substance Use Topics     Alcohol use: Yes     Comment: occasional     Family History    Problem Relation Age of Onset     Cancer Father          at 60, smoker lung     Cancer Brother         testicular cancer, doing well. Skin cancer     Other Cancer Brother         Liver and lung     Respiratory Mother         asthma-uses Advair         Current Outpatient Medications   Medication Sig Dispense Refill     HYDROcodone-acetaminophen (NORCO) 5-325 MG tablet Take 1 tablet by mouth every 6 hours as needed for severe pain 10 tablet 0     predniSONE (DELTASONE) 20 MG tablet Take 2 tablets (40 mg) by mouth daily 10 tablet 0     ranitidine (ZANTAC) 150 MG tablet Take 1 tablet (150 mg) by mouth 2 times daily For stomach. (Patient not taking: Reported on 2020) 60 tablet 1     BP Readings from Last 3 Encounters:   20 (!) 147/92   10/03/19 100/72   19 100/70    Wt Readings from Last 3 Encounters:   20 85.3 kg (188 lb)   10/03/19 85.3 kg (188 lb)   19 85.4 kg (188 lb 6 oz)                    Reviewed and updated as needed this visit by Provider  Tobacco  Allergies  Meds  Problems  Med Hx  Surg Hx  Fam Hx         Review of Systems   Constitutional, HEENT, cardiovascular, pulmonary, GI, , musculoskeletal, neuro, skin, endocrine and psych systems are negative, except as otherwise noted.      Objective    BP (!) 147/92   Pulse 90   Temp 98.6  F (37  C) (Tympanic)   Resp 15   Ht 1.829 m (6')   Wt 85.3 kg (188 lb)   BMI 25.50 kg/m    Body mass index is 25.5 kg/m .  Physical Exam   GENERAL: healthy, alert and no distress  MS: no gross musculoskeletal defects noted, gait normal, slightly antalgic.  Full ROM of ankle and toes.    Foot examination performed.  No lesions or skin breakdown noted.   Dorsalis pedis and posterior tibialis pulses intact bilaterally.  No excessive warmth.  Posterior heal with mild swelling and tenderness.     SKIN: no suspicious lesions or rashes    Diagnostic Test Results:  none         Assessment & Plan     1. Acute gouty arthritis  Acute Gout  Flare    Please refer to today's diagnoses and orders for further details.  I briefly discussed the pathophysiology of Gout and outlined the expected course.  Patient is to limit alcohol, meats and high purine foods.  Patient education materials on gout were given to patient.     I discussed the warning symptoms and signs that indicate an atypical course that would need urgent follow up including findings that would require a call to 911. I also discussed strategies that could provide symptomatic relief.        - HYDROcodone-acetaminophen (NORCO) 5-325 MG tablet; Take 1 tablet by mouth every 6 hours as needed for severe pain  Dispense: 10 tablet; Refill: 0  - predniSONE (DELTASONE) 20 MG tablet; Take 2 tablets (40 mg) by mouth daily  Dispense: 10 tablet; Refill: 0     BMI:   Estimated body mass index is 25.5 kg/m  as calculated from the following:    Height as of this encounter: 1.829 m (6').    Weight as of this encounter: 85.3 kg (188 lb).               Return in about 2 weeks (around 8/18/2020) for a recheck if symptoms do not improve.    Reba Cooper PA-C  [facility]

## 2020-08-04 NOTE — TELEPHONE ENCOUNTER
Reba, pt made telephone appt with you at 1:20.     Hope your ok with the spot I put him in.     He said if dr horton fills this before appt , he would like to cancel it.     Karen Cronin, Station

## 2020-08-04 NOTE — TELEPHONE ENCOUNTER
Spoke with patient reports the swelling of back of left ankle and heal started 2 days ago.  Reports increased pain 10/10 last night, difficulty weight bearing  Reports this is a new location for him to get gout.  Denies fever or chills    Dr. Urena last prescribed prednisone and Norco on 5/22/20    Lety Bowen RN

## 2020-08-04 NOTE — TELEPHONE ENCOUNTER
Dr horton, please  Address this  Request.  Pt is calling and checking status, please advise.     Karen Cronin, [place]

## 2020-08-04 NOTE — TELEPHONE ENCOUNTER
Please call patient and add to a virtual provider's schedule or ok to add this on as a phone visit for me today if needed  Reba Cooper PA-C

## 2020-08-11 ENCOUNTER — TELEPHONE (OUTPATIENT)
Dept: FAMILY MEDICINE | Facility: CLINIC | Age: 55
End: 2020-08-11

## 2020-08-11 DIAGNOSIS — M10.9 ACUTE GOUTY ARTHRITIS: ICD-10-CM

## 2020-08-11 NOTE — TELEPHONE ENCOUNTER
patient was in to see Reba last week and is asking for another round of prednisone and Norco, he is still having a lot of pain in his foot.  Pharmacy selected.    Lety Martini [facility]

## 2020-08-12 NOTE — TELEPHONE ENCOUNTER
patient called again today asking if he can get his request for medication filled today, he is having a gout flare and its painful.    Lety Martini [facility]

## 2020-08-13 RX ORDER — PREDNISONE 20 MG/1
40 TABLET ORAL DAILY
Qty: 10 TABLET | Refills: 0 | Status: SHIPPED | OUTPATIENT
Start: 2020-08-13 | End: 2020-08-23

## 2020-08-13 RX ORDER — HYDROCODONE BITARTRATE AND ACETAMINOPHEN 5; 325 MG/1; MG/1
1 TABLET ORAL EVERY 6 HOURS PRN
Qty: 10 TABLET | Refills: 0 | Status: SHIPPED | OUTPATIENT
Start: 2020-08-13 | End: 2020-08-28

## 2020-08-13 NOTE — TELEPHONE ENCOUNTER
Message left on patient's cell phone, after confirming this was the patient's phone.  I stated I had refilled his pain medication and prednisone.  The prednisone prescription was extended for 10 days versus 5 days.  He is to contact our clinic with any questions or concerns.   [Adequate] : adequate

## 2020-08-13 NOTE — TELEPHONE ENCOUNTER
Pt. Called still having gout issues even after taking medication . Please call to advise what patient should do. Has been waiting for Dr. Stringer to call him about refilling his medication but not getting any communication.     Pls call patient to advise. [phone]

## 2020-08-27 ENCOUNTER — TELEPHONE (OUTPATIENT)
Dept: FAMILY MEDICINE | Facility: CLINIC | Age: 55
End: 2020-08-27

## 2020-08-27 NOTE — TELEPHONE ENCOUNTER
Pt is calling and states That his gout is really flared up in both his feet but mainly his right foot, on going for three weeks and was treated two weeks ago with prednisone  But nothing really seems to be helping, he wants to know if he can get some indomethacin and something for the pain, please call to triage.     Karen Cronin, [place]

## 2020-08-27 NOTE — TELEPHONE ENCOUNTER
Uric Acid   Date Value Ref Range Status   09/25/2019 8.8 (H) 3.5 - 7.2 mg/dL Final     Gout -     Onset: first onset 8/1/2020   27days ago     Saw Allison 8/4  Given NORCO /steroid    for pain /inflammation     Description:   Location: big toe knuckle  and foot  Bilateral   Joint Swelling: YES  Redness: YES  Pain: YES     9 of 10   History of trauma to the area: no  Fevers: no    History:        Previous history of gout: YES    Family history of gout: }[id number]}  Recent illness:  no  History of cancer: no  History of trauma to the area: no    Precipitating and/or Alleviating factors:    Diet-rich in: alcohol vodka --Mainly on week ends  Red meat   Alcohol use: ues  Weekend   Diuretic use: no    Progression of Symptoms: (better, worse, same): worse    Therapies tried and outcome: soaking feet  Epsom salt  with minor relief    Spoke with patient  He feels he had a bad time sx got worse when he took Allopurinol , not very sure he wants to take it again, discussed how gout acts and need to rcahel uric acid and take allourinol  For prevention   As high steroids and pain meds are not a preventive    Advised appt made for tomorrow at [facility]  Pt ok  Wear mask   PRachel Henson  Clinic  RN/Joel Canales

## 2020-08-28 ENCOUNTER — OFFICE VISIT (OUTPATIENT)
Dept: FAMILY MEDICINE | Facility: CLINIC | Age: 55
End: 2020-08-28
Payer: COMMERCIAL

## 2020-08-28 VITALS
SYSTOLIC BLOOD PRESSURE: 115 MMHG | HEART RATE: 82 BPM | DIASTOLIC BLOOD PRESSURE: 77 MMHG | WEIGHT: 186 LBS | BODY MASS INDEX: 25.23 KG/M2 | TEMPERATURE: 99.3 F

## 2020-08-28 DIAGNOSIS — Z13.220 LIPID SCREENING: ICD-10-CM

## 2020-08-28 DIAGNOSIS — M79.673 PAIN, FOOT, CHRONIC, UNSPECIFIED LATERALITY: ICD-10-CM

## 2020-08-28 DIAGNOSIS — K21.9 GASTROESOPHAGEAL REFLUX DISEASE, ESOPHAGITIS PRESENCE NOT SPECIFIED: ICD-10-CM

## 2020-08-28 DIAGNOSIS — M10.9 ACUTE GOUTY ARTHRITIS: Primary | ICD-10-CM

## 2020-08-28 DIAGNOSIS — G89.29 PAIN, FOOT, CHRONIC, UNSPECIFIED LATERALITY: ICD-10-CM

## 2020-08-28 LAB
ALBUMIN SERPL-MCNC: 3.6 G/DL (ref 3.4–5)
ALP SERPL-CCNC: 62 U/L (ref 40–150)
ALT SERPL W P-5'-P-CCNC: 26 U/L (ref 0–70)
ANION GAP SERPL CALCULATED.3IONS-SCNC: 5 MMOL/L (ref 3–14)
AST SERPL W P-5'-P-CCNC: 14 U/L (ref 0–45)
BILIRUB SERPL-MCNC: 0.6 MG/DL (ref 0.2–1.3)
BUN SERPL-MCNC: 11 MG/DL (ref 7–30)
CALCIUM SERPL-MCNC: 9 MG/DL (ref 8.5–10.1)
CHLORIDE SERPL-SCNC: 108 MMOL/L (ref 94–109)
CHOLEST SERPL-MCNC: 197 MG/DL
CO2 SERPL-SCNC: 28 MMOL/L (ref 20–32)
CREAT SERPL-MCNC: 0.99 MG/DL (ref 0.66–1.25)
GFR SERPL CREATININE-BSD FRML MDRD: 84 ML/MIN/{1.73_M2}
GLUCOSE SERPL-MCNC: 91 MG/DL (ref 70–99)
HDLC SERPL-MCNC: 53 MG/DL
LDLC SERPL CALC-MCNC: 104 MG/DL
NONHDLC SERPL-MCNC: 144 MG/DL
POTASSIUM SERPL-SCNC: 4.2 MMOL/L (ref 3.4–5.3)
PROT SERPL-MCNC: 7 G/DL (ref 6.8–8.8)
SODIUM SERPL-SCNC: 141 MMOL/L (ref 133–144)
TRIGL SERPL-MCNC: 201 MG/DL
URATE SERPL-MCNC: 8.2 MG/DL (ref 3.5–7.2)

## 2020-08-28 PROCEDURE — 36415 COLL VENOUS BLD VENIPUNCTURE: CPT | Performed by: NURSE PRACTITIONER

## 2020-08-28 PROCEDURE — 80053 COMPREHEN METABOLIC PANEL: CPT | Performed by: NURSE PRACTITIONER

## 2020-08-28 PROCEDURE — 80061 LIPID PANEL: CPT | Performed by: NURSE PRACTITIONER

## 2020-08-28 PROCEDURE — 99214 OFFICE O/P EST MOD 30 MIN: CPT | Performed by: NURSE PRACTITIONER

## 2020-08-28 PROCEDURE — 84550 ASSAY OF BLOOD/URIC ACID: CPT | Performed by: NURSE PRACTITIONER

## 2020-08-28 RX ORDER — PREDNISONE 10 MG/1
10 TABLET ORAL 2 TIMES DAILY
Qty: 20 TABLET | Refills: 0 | Status: SHIPPED | OUTPATIENT
Start: 2020-08-28 | End: 2021-04-15

## 2020-08-28 RX ORDER — HYDROCODONE BITARTRATE AND ACETAMINOPHEN 5; 325 MG/1; MG/1
1 TABLET ORAL EVERY 6 HOURS PRN
Qty: 18 TABLET | Refills: 0 | Status: SHIPPED | OUTPATIENT
Start: 2020-08-28 | End: 2020-08-31

## 2020-08-28 NOTE — PROGRESS NOTES
Subjective     Favio Jacinto is a 54 year old male who presents to clinic today for the following health issues:    HPI     * bilateral foot pain, redness, swelling for the last month        Review of Systems   CONSTITUTIONAL: NEGATIVE for fever, chills, change in weight  ENT/MOUTH: NEGATIVE for ear, mouth and throat problems  RESP: NEGATIVE for significant cough or SOB  CV: NEGATIVE for chest pain, palpitations or peripheral edema  MUSCULOSKELETAL: POSITIVE  for pain started in the left ankle, then the the big toe and the  Is now also having pain in the right foot   Has had gout for years.  Usually gets it once per year.  ;this year has had it twice   Has tried Allopurinol but had a bad gout attack.   Has used Prednisone in the past with success but this time he has not had resolution   He did have some old  Indomethacin and that seemed to help   Does watch diet,   Did go to [facility] ate fish and that did trigger a gout attack,   Did start drinking dark cherry juice that usually helps but this time it didn't help       Objective    /77   Pulse 82   Temp 99.3  F (37.4  C) (Tympanic)   Wt 84.4 kg (186 lb)   BMI 25.23 kg/m    Body mass index is 25.23 kg/m .  Physical Exam   GENERAL: healthy, alert and no distress  RESP: lungs clear to auscultation - no rales, rhonchi or wheezes  CV: regular rate and rhythm, normal S1 S2, no S3 or S4, no murmur, click or rub, no peripheral edema and peripheral pulses strong  MS: right foot does have swelling with the great toe and plantar side of the foot.    The left foot does also have some swelling of the great toe - is less painful    Pending         ASSESSMENT/PLAN:      ICD-10-CM    1. Acute gouty arthritis  M10.9 predniSONE (DELTASONE) 10 MG tablet     HYDROcodone-acetaminophen (NORCO) 5-325 MG tablet     Uric acid     Comprehensive metabolic panel   2. Pain, foot, chronic, unspecified laterality  M79.673 HYDROcodone-acetaminophen (NORCO) 5-325 MG tablet    G89.29     3. Gastroesophageal reflux disease, esophagitis presence not specified  K21.9 Comprehensive metabolic panel   4. Lipid screening  Z13.220 Lipid panel reflex to direct LDL Fasting     Comprehensive metabolic panel       Patient Instructions   I did order the   Prednisone that has worked for you in the past.     I did order some  Pain medication     Labs were also ordered as it has been a year since you have had your lab work done     Continue to take good care of your feet ,     Stay well hydrated     Avoid the causative foods for gout      Drink the cherry juice

## 2020-08-28 NOTE — NURSING NOTE
Initial /77   Pulse 82   Temp 99.3  F (37.4  C) (Tympanic)   Wt 84.4 kg (186 lb)   BMI 25.23 kg/m   Estimated body mass index is 25.23 kg/m  as calculated from the following:    Height as of 8/4/20: 1.829 m (6').    Weight as of this encounter: 84.4 kg (186 lb). .       Stable

## 2020-08-28 NOTE — PATIENT INSTRUCTIONS
I did order the   Prednisone that has worked for you in the past.     I did order some  Pain medication     Labs were also ordered as it has been a year since you have had your lab work done     Continue to take good care of your feet ,     Stay well hydrated     Avoid the causative foods for gout      Drink the cherry juice

## 2020-11-16 ENCOUNTER — HEALTH MAINTENANCE LETTER (OUTPATIENT)
Age: 55
End: 2020-11-16

## 2021-04-14 ENCOUNTER — TELEPHONE (OUTPATIENT)
Dept: FAMILY MEDICINE | Facility: CLINIC | Age: 56
End: 2021-04-14

## 2021-04-14 NOTE — TELEPHONE ENCOUNTER
Patient is asking for another round of prednisone and Norco, he is still having a lot of pain in his foot. Pharmacy selected.

## 2021-04-14 NOTE — TELEPHONE ENCOUNTER
Patient has not been seen since 8/28/2020. Appointment needed for prescriptions to be sent in. Called patient and set him up with Dr Stringer per his request tomorrow morning.     Reba Bates RN

## 2021-04-15 ENCOUNTER — VIRTUAL VISIT (OUTPATIENT)
Dept: FAMILY MEDICINE | Facility: CLINIC | Age: 56
End: 2021-04-15
Payer: COMMERCIAL

## 2021-04-15 DIAGNOSIS — M10.9 ACUTE GOUTY ARTHRITIS: Primary | ICD-10-CM

## 2021-04-15 PROCEDURE — 99213 OFFICE O/P EST LOW 20 MIN: CPT | Mod: TEL | Performed by: FAMILY MEDICINE

## 2021-04-15 RX ORDER — ALLOPURINOL 100 MG/1
100 TABLET ORAL DAILY
Qty: 90 TABLET | Refills: 1 | Status: SHIPPED | OUTPATIENT
Start: 2021-04-15 | End: 2021-11-11

## 2021-04-15 RX ORDER — HYDROCODONE BITARTRATE AND ACETAMINOPHEN 5; 325 MG/1; MG/1
TABLET ORAL
Qty: 40 TABLET | Refills: 0 | Status: SHIPPED | OUTPATIENT
Start: 2021-04-15 | End: 2022-03-18

## 2021-04-15 RX ORDER — PREDNISONE 20 MG/1
TABLET ORAL
Qty: 42 TABLET | Refills: 0 | Status: SHIPPED | OUTPATIENT
Start: 2021-04-15 | End: 2021-04-29

## 2021-04-15 NOTE — PROGRESS NOTES
Mason is a 55 year old who is being evaluated via a billable telephone visit.      What phone number would you like to be contacted at? [phone]  How would you like to obtain your AVS? Bernadette    (M10.9) Acute gouty arthritis  (primary encounter diagnosis)  Comment: Significant symptoms, recurrent flares.  Intolerant to allopurinol in the past.  Associated bilateral foot pain, affecting daily activities and some associated neuropathy.  We will treat the acute flare with prednisone plus pain medications.  We will start low-dose allopurinol to see if he  tolerates this medication.  Also refer to podiatry for consultation regarding management of his foot pain.  Plan: Orthopedic & Spine  Referral,         allopurinol (ZYLOPRIM) 100 MG tablet,         predniSONE (DELTASONE) 20 MG tablet,         HYDROcodone-acetaminophen (NORCO) 5-325 MG         tablet        Reviewed side effects of the medication.  Advise follow-up in 2 to 3 months to check uric acid level.   Sooner if foot pain does not improve.        Patient Instructions   **Prescription for prednisone 40 mg daily for up to 3 weeks.  If the foot pain goes away, do not need to complete the prescription.    I also sent prescription for pain pills to your pharmacy.    Let us try a low-dose allopurinol to prevent gout.  I sent a prescription for 100 mg daily.  You may wait until the pain subsides before starting this medication.    I also placed a referral to see a podiatrist, foot specialist.    I would also recommend seeing a foot specialist, Dr. Epi Rosen.  His office number is [phone].         Subjective   Mason is a 55 year old who presents for the following health issues     HPI     Gout Pain Follow-Up    Where in your body do you have pain? Gout of Bilateral Feet(two big toes are the worst), flare-up started 2 days ago   Which of these pain treatments have you tried since your last clinic visit? Other: Prednisone- this has worked and not  worked in the past, last flare-up pt reports it only working somewhat  How well are you sleeping? Fair  Other aggravating factors: prolonged standing    No flowsheet data found.  No flowsheet data found.  No flowsheet data found.  Encounter-Level CSA:    There are no encounter-level csa.     Patient-Level CSA:    There are no patient-level csa.               Review of Systems   CONSTITUTIONAL: NEGATIVE for fever, chills, change in weight  ENT/MOUTH: NEGATIVE for ear, mouth and throat problems  RESP: NEGATIVE for significant cough or SOB  CV: NEGATIVE for chest pain, palpitations or peripheral edema  MUSCULOSKELETAL: Bilateral foot pain.  Objective           Vitals:  No vitals were obtained today due to virtual visit.    Physical Exam   healthy, alert and no distress  PSYCH: Alert   His affect is normal  RESP: No cough, no audible wheezing, able to talk in full sentences  Remainder of exam unable to be completed due to telephone visits             Phone call duration: 18 minutes    Leah Stringer MD

## 2021-04-15 NOTE — PATIENT INSTRUCTIONS
**Prescription for prednisone 40 mg daily for up to 3 weeks.  If the foot pain goes away, do not need to complete the prescription.    I also sent prescription for pain pills to your pharmacy.    Let us try a low-dose allopurinol to prevent gout.  I sent a prescription for 100 mg daily.  You may wait until the pain subsides before starting this medication.    I also placed a referral to see a podiatrist, foot specialist.    I would also recommend seeing a foot specialist, Dr. Epi Rosen.  His office number is [phone].

## 2021-04-28 DIAGNOSIS — M10.9 ACUTE GOUTY ARTHRITIS: ICD-10-CM

## 2021-04-28 NOTE — TELEPHONE ENCOUNTER
Requested Prescriptions   Pending Prescriptions Disp Refills     predniSONE (DELTASONE) 20 MG tablet [Pharmacy Med Name: PREDNISONE 20 MG TABLET] 42 tablet 0     Sig: TAKE 2 TABLETS BY MOUTH DAILY FOR ACUTE GOUT PAIN       There is no refill protocol information for this order        Routing refill request to provider for review/approval because:  Drug not on the [facility] [place]     Nayely BARRY-RN  Triage Nurse  [facility]: [facility]

## 2021-04-29 RX ORDER — PREDNISONE 20 MG/1
TABLET ORAL
Qty: 42 TABLET | Refills: 0 | Status: SHIPPED | OUTPATIENT
Start: 2021-04-29 | End: 2022-03-18

## 2021-09-12 ENCOUNTER — HEALTH MAINTENANCE LETTER (OUTPATIENT)
Age: 56
End: 2021-09-12

## 2021-11-10 DIAGNOSIS — M10.9 ACUTE GOUTY ARTHRITIS: ICD-10-CM

## 2021-11-10 NOTE — TELEPHONE ENCOUNTER
"Requested Prescriptions   Pending Prescriptions Disp Refills    allopurinol (ZYLOPRIM) 100 MG tablet [Pharmacy Med Name: ALLOPURINOL 100 MG TABLET] 90 tablet 1     Sig: TAKE 1 TABLET (100 MG) BY MOUTH DAILY        Gout Agents Protocol Failed - 11/10/2021 12:31 AM        Failed - CBC on file in past 12 months     Recent Labs   Lab Test 09/25/19  1649   WBC 6.9   RBC 4.93   HGB 15.1   HCT 44.4                      Failed - ALT on file in past 12 months     Recent Labs   Lab Test 08/28/20 0919   ALT 26               Failed - Has Uric Acid on file in past 12 months and value is less than 6     Recent Labs   Lab Test 08/28/20 0919   URIC 8.2*     If level is 6mg/dL or greater, ok to refill one time and refer to provider.             Failed - Normal serum creatinine on file in the past 12 months     Recent Labs   Lab Test 08/28/20 0919   CR 0.99       Ok to refill medication if creatinine is low          Passed - Recent (12 mo) or future (30 days) visit within the authorizing provider's specialty     Patient has had an office visit with the authorizing provider or a provider within the authorizing providers department within the previous 12 mos or has a future within next 30 days. See \"Patient Info\" tab in inbasket, or \"Choose Columns\" in Meds & Orders section of the refill encounter.              Passed - Medication is active on med list        Passed - Patient is age 18 or older            Routing refill request to provider for review/approval because:  Failed protocol.  Nayely Millan RN, BSN  Triage nurse  [facility]: [place]"

## 2021-11-11 RX ORDER — ALLOPURINOL 100 MG/1
TABLET ORAL
Qty: 90 TABLET | Refills: 1 | Status: SHIPPED | OUTPATIENT
Start: 2021-11-11 | End: 2022-03-20

## 2022-01-02 ENCOUNTER — HEALTH MAINTENANCE LETTER (OUTPATIENT)
Age: 57
End: 2022-01-02

## 2022-03-18 ENCOUNTER — OFFICE VISIT (OUTPATIENT)
Dept: FAMILY MEDICINE | Facility: CLINIC | Age: 57
End: 2022-03-18
Payer: COMMERCIAL

## 2022-03-18 VITALS
BODY MASS INDEX: 25.98 KG/M2 | OXYGEN SATURATION: 99 % | SYSTOLIC BLOOD PRESSURE: 115 MMHG | RESPIRATION RATE: 14 BRPM | WEIGHT: 185.6 LBS | TEMPERATURE: 97.3 F | DIASTOLIC BLOOD PRESSURE: 82 MMHG | HEIGHT: 71 IN | HEART RATE: 70 BPM

## 2022-03-18 DIAGNOSIS — M10.9 ACUTE GOUTY ARTHRITIS: ICD-10-CM

## 2022-03-18 DIAGNOSIS — Z12.5 SCREENING FOR PROSTATE CANCER: ICD-10-CM

## 2022-03-18 DIAGNOSIS — R73.09 ABNORMAL GLUCOSE: ICD-10-CM

## 2022-03-18 DIAGNOSIS — Q23.81 BICUSPID AORTIC VALVE: ICD-10-CM

## 2022-03-18 DIAGNOSIS — Z00.00 ROUTINE GENERAL MEDICAL EXAMINATION AT A HEALTH CARE FACILITY: Primary | ICD-10-CM

## 2022-03-18 DIAGNOSIS — I48.0 PAROXYSMAL ATRIAL FIBRILLATION (H): ICD-10-CM

## 2022-03-18 DIAGNOSIS — Z13.6 CARDIOVASCULAR SCREENING; LDL GOAL LESS THAN 160: ICD-10-CM

## 2022-03-18 DIAGNOSIS — M79.672 BILATERAL FOOT PAIN: ICD-10-CM

## 2022-03-18 DIAGNOSIS — M79.671 BILATERAL FOOT PAIN: ICD-10-CM

## 2022-03-18 DIAGNOSIS — Z12.11 COLON CANCER SCREENING: ICD-10-CM

## 2022-03-18 DIAGNOSIS — I35.0 NONRHEUMATIC AORTIC VALVE STENOSIS: ICD-10-CM

## 2022-03-18 LAB
ALBUMIN SERPL-MCNC: 4.3 G/DL (ref 3.4–5)
ALP SERPL-CCNC: 55 U/L (ref 40–150)
ALT SERPL W P-5'-P-CCNC: 27 U/L (ref 0–70)
ANION GAP SERPL CALCULATED.3IONS-SCNC: 5 MMOL/L (ref 3–14)
AST SERPL W P-5'-P-CCNC: 19 U/L (ref 0–45)
BILIRUB SERPL-MCNC: 0.6 MG/DL (ref 0.2–1.3)
BUN SERPL-MCNC: 17 MG/DL (ref 7–30)
CALCIUM SERPL-MCNC: 8.8 MG/DL (ref 8.5–10.1)
CHLORIDE BLD-SCNC: 110 MMOL/L (ref 94–109)
CHOLEST SERPL-MCNC: 202 MG/DL
CO2 SERPL-SCNC: 25 MMOL/L (ref 20–32)
CREAT SERPL-MCNC: 1 MG/DL (ref 0.66–1.25)
FASTING STATUS PATIENT QL REPORTED: YES
GFR SERPL CREATININE-BSD FRML MDRD: 87 ML/MIN/1.73M2
GLUCOSE BLD-MCNC: 96 MG/DL (ref 70–99)
HBA1C MFR BLD: 5.3 % (ref 0–5.6)
HDLC SERPL-MCNC: 58 MG/DL
LDLC SERPL CALC-MCNC: 127 MG/DL
NONHDLC SERPL-MCNC: 144 MG/DL
POTASSIUM BLD-SCNC: 5 MMOL/L (ref 3.4–5.3)
PROT SERPL-MCNC: 7.3 G/DL (ref 6.8–8.8)
PSA SERPL-MCNC: 0.9 UG/L (ref 0–4)
SODIUM SERPL-SCNC: 140 MMOL/L (ref 133–144)
TRIGL SERPL-MCNC: 86 MG/DL
URATE SERPL-MCNC: 7.4 MG/DL (ref 3.5–7.2)

## 2022-03-18 PROCEDURE — 99396 PREV VISIT EST AGE 40-64: CPT | Mod: 25 | Performed by: FAMILY MEDICINE

## 2022-03-18 PROCEDURE — G0103 PSA SCREENING: HCPCS | Performed by: FAMILY MEDICINE

## 2022-03-18 PROCEDURE — 83036 HEMOGLOBIN GLYCOSYLATED A1C: CPT | Performed by: FAMILY MEDICINE

## 2022-03-18 PROCEDURE — 90471 IMMUNIZATION ADMIN: CPT | Performed by: FAMILY MEDICINE

## 2022-03-18 PROCEDURE — 80061 LIPID PANEL: CPT | Performed by: FAMILY MEDICINE

## 2022-03-18 PROCEDURE — 90750 HZV VACC RECOMBINANT IM: CPT | Performed by: FAMILY MEDICINE

## 2022-03-18 PROCEDURE — 84550 ASSAY OF BLOOD/URIC ACID: CPT | Performed by: FAMILY MEDICINE

## 2022-03-18 PROCEDURE — 80053 COMPREHEN METABOLIC PANEL: CPT | Performed by: FAMILY MEDICINE

## 2022-03-18 PROCEDURE — 99214 OFFICE O/P EST MOD 30 MIN: CPT | Mod: 25 | Performed by: FAMILY MEDICINE

## 2022-03-18 PROCEDURE — 36415 COLL VENOUS BLD VENIPUNCTURE: CPT | Performed by: FAMILY MEDICINE

## 2022-03-18 RX ORDER — PREDNISONE 20 MG/1
TABLET ORAL
Qty: 42 TABLET | Refills: 0 | Status: SHIPPED | OUTPATIENT
Start: 2022-03-18 | End: 2024-06-21

## 2022-03-18 RX ORDER — HYDROCODONE BITARTRATE AND ACETAMINOPHEN 5; 325 MG/1; MG/1
TABLET ORAL
Qty: 40 TABLET | Refills: 0 | Status: CANCELLED | OUTPATIENT
Start: 2022-03-18

## 2022-03-18 RX ORDER — HYDROCODONE BITARTRATE AND ACETAMINOPHEN 5; 325 MG/1; MG/1
TABLET ORAL
Qty: 40 TABLET | Refills: 0 | Status: SHIPPED | OUTPATIENT
Start: 2022-03-18 | End: 2022-05-11

## 2022-03-18 ASSESSMENT — ENCOUNTER SYMPTOMS
EYE PAIN: 0
COUGH: 0
FEVER: 0
HEADACHES: 0
PALPITATIONS: 0
SORE THROAT: 0
NAUSEA: 0
HEMATOCHEZIA: 0
MYALGIAS: 0
NERVOUS/ANXIOUS: 0
CONSTIPATION: 0
FREQUENCY: 0
HEMATURIA: 0
WEAKNESS: 0
DYSURIA: 0
PARESTHESIAS: 0
ARTHRALGIAS: 0
ABDOMINAL PAIN: 0
JOINT SWELLING: 0
DIZZINESS: 0
CHILLS: 0
DIARRHEA: 0
SHORTNESS OF BREATH: 0
HEARTBURN: 0

## 2022-03-18 ASSESSMENT — PAIN SCALES - GENERAL: PAINLEVEL: NO PAIN (0)

## 2022-03-18 NOTE — PATIENT INSTRUCTIONS
Preventive Health Recommendations  Male Ages 50 - 64    For the feet, see a foot specialist.   DAY Rosen MD, [facility]  [address] [place] [address]   ~10 mi  [phone]    Blood tests today.     Time for a colonoscopy. If you have not heard from the scheduling office within 2 business days, please call [phone].    Call to set up an echocardiogram: [phone].     Time to quit chewing, cut down on alcohol by 1/2.     First shingles shot, second and final booster in 2-6 months.     Refill on prednisone and hydrocodone.     Yearly exam:             See your health care provider every year in order to  o   Review health changes.   o   Discuss preventive care.    o   Review your medicines if your doctor has prescribed any.     Have a cholesterol test every 5 years, or more frequently if you are at risk for high cholesterol/heart disease.     Have a diabetes test (fasting glucose) every three years. If you are at risk for diabetes, you should have this test more often.     Have a colonoscopy at age 50, or have a yearly FIT test (stool test). These exams will check for colon cancer.      Talk with your health care provider about whether or not a prostate cancer screening test (PSA) is right for you.    You should be tested each year for STDs (sexually transmitted diseases), if you re at risk.     Shots: Get a flu shot each year. Get a tetanus shot every 10 years.     Nutrition:    Eat at least 5 servings of fruits and vegetables daily.     Eat whole-grain bread, whole-wheat pasta and brown rice instead of white grains and rice.     Get adequate Calcium and Vitamin D.     Lifestyle    Exercise for at least 150 minutes a week (30 minutes a day, 5 days a week). This will help you control your weight and prevent disease.     Limit alcohol to one drink per day.     No smoking.     Wear sunscreen to prevent skin cancer.     See your dentist every six months for an exam and cleaning.     See your eye  doctor every 1 to 2 years.

## 2022-03-18 NOTE — PROGRESS NOTES
SUBJECTIVE:   CC: Favio Jacinto is an 56 year old male who presents for preventative health visit.       Patient has been advised of split billing requirements and indicates understanding: Yes  Healthy Habits:     Getting at least 3 servings of Calcium per day:  NO    Bi-annual eye exam:  NO    Dental care twice a year:  Yes    Sleep apnea or symptoms of sleep apnea:  None    Diet:  Regular (no restrictions)    Frequency of exercise:  2-3 days/week    Duration of exercise:  15-30 minutes    Taking medications regularly:  Yes    Medication side effects:  Not applicable    PHQ-2 Total Score: 0    Additional concerns today:  No    Told patient shingles vaccine may not be covered under his insurance and to double check with insurance first. Patient wanted shingles vaccine today  Emma Smallwood, [facility]      PROBLEMS TO ADD ON...    Ongoing pain both big toes.   -------------------------------------    Today's PHQ-2 Score:   PHQ-2 ( 1999 Pfizer) 3/18/2022   Q1: Little interest or pleasure in doing things 0   Q2: Feeling down, depressed or hopeless 0   PHQ-2 Score 0   PHQ-2 Total Score (12-17 Years)- Positive if 3 or more points; Administer PHQ-A if positive -   Q1: Little interest or pleasure in doing things Not at all   Q2: Feeling down, depressed or hopeless Not at all   PHQ-2 Score 0       Abuse: Current or Past(Physical, Sexual or Emotional)- No  Do you feel safe in your environment? Yes    Have you ever done Advance Care Planning? (For example, a Health Directive, POLST, or a discussion with a medical provider or your loved ones about your wishes): No, advance care planning information given to patient to review.  Patient plans to discuss their wishes with loved ones or provider.      Social History     Tobacco Use     Smoking status: Former Smoker     Types: Dip, chew, snus or snuff     Quit date: 1/1/2007     Years since quitting: 15.2     Smokeless tobacco: Current User     Types: Chew   Substance Use Topics      "Alcohol use: Yes     Comment: occasional     If you drink alcohol do you typically have >3 drinks per day or >7 drinks per week? Yes      Alcohol Use 3/18/2022   Prescreen: >3 drinks/day or >7 drinks/week? Yes   Prescreen: >3 drinks/day or >7 drinks/week? -   AUDIT SCORE  17       Last PSA:   PSA   Date Value Ref Range Status   04/26/2017 0.77 0 - 4 ug/L Final     Comment:     Assay Method:  Chemiluminescence using Siemens Vista analyzer        Tobacco  Allergies  Meds   Med Hx  Surg Hx  Fam Hx  Soc Hx        Reviewed and updated as needed this visit by Provider                     Review of Systems   Constitutional: Negative for chills and fever.   HENT: Negative for congestion, ear pain, hearing loss and sore throat.    Eyes: Negative for pain and visual disturbance.   Respiratory: Negative for cough and shortness of breath.    Cardiovascular: Negative for chest pain, palpitations and peripheral edema.   Gastrointestinal: Negative for abdominal pain, constipation, diarrhea, heartburn, hematochezia and nausea.   Genitourinary: Negative for dysuria, frequency, genital sores, hematuria, impotence, penile discharge and urgency.   Musculoskeletal: Negative for arthralgias, joint swelling and myalgias.   Skin: Negative for rash.   Neurological: Negative for dizziness, weakness, headaches and paresthesias.   Psychiatric/Behavioral: Negative for mood changes. The patient is not nervous/anxious.        OBJECTIVE:   /82   Pulse 70   Temp 97.3  F (36.3  C) (Tympanic)   Resp 14   Ht 1.805 m (5' 11.06\")   Wt 84.2 kg (185 lb 9.6 oz)   SpO2 99%   BMI 25.84 kg/m      Physical Exam  GENERAL: Healthy, alert and no distress  EYES: Eyes grossly normal to inspection, conjunctivae and sclerae normal  RESP: Lungs clear to auscultation - no rales, rhonchi or wheezes  CV: Regular rate and rhythm, normal S1 S2, no murmur  MS: No gross musculoskeletal defects noted, no edema  NEURO: Normal strength and tone, mentation " intact and speech normal  PSYCH: Mentation appears normal, affect normal/bright     Diagnostic Test Results:  Labs reviewed in Epic  Results for orders placed or performed in visit on 03/18/22   Comprehensive metabolic panel (BMP + Alb, Alk Phos, ALT, AST, Total. Bili, TP)     Status: Abnormal   Result Value Ref Range    Sodium 140 133 - 144 mmol/L    Potassium 5.0 3.4 - 5.3 mmol/L    Chloride 110 (H) 94 - 109 mmol/L    Carbon Dioxide (CO2) 25 20 - 32 mmol/L    Anion Gap 5 3 - 14 mmol/L    Urea Nitrogen 17 7 - 30 mg/dL    Creatinine 1.00 0.66 - 1.25 mg/dL    Calcium 8.8 8.5 - 10.1 mg/dL    Glucose 96 70 - 99 mg/dL    Alkaline Phosphatase 55 40 - 150 U/L    AST 19 0 - 45 U/L    ALT 27 0 - 70 U/L    Protein Total 7.3 6.8 - 8.8 g/dL    Albumin 4.3 3.4 - 5.0 g/dL    Bilirubin Total 0.6 0.2 - 1.3 mg/dL    GFR Estimate 87 >60 mL/min/1.73m2   Lipid panel reflex to direct LDL Fasting     Status: Abnormal   Result Value Ref Range    Cholesterol 202 (H) <200 mg/dL    Triglycerides 86 <150 mg/dL    Direct Measure HDL 58 >=40 mg/dL    LDL Cholesterol Calculated 127 (H) <=100 mg/dL    Non HDL Cholesterol 144 (H) <130 mg/dL    Patient Fasting > 8hrs? Yes     Narrative    Cholesterol  Desirable:  <200 mg/dL    Triglycerides  Normal:  Less than 150 mg/dL  Borderline High:  150-199 mg/dL  High:  200-499 mg/dL  Very High:  Greater than or equal to 500 mg/dL    Direct Measure HDL  Female:  Greater than or equal to 50 mg/dL   Male:  Greater than or equal to 40 mg/dL    LDL Cholesterol  Desirable:  <100mg/dL  Above Desirable:  100-129 mg/dL   Borderline High:  130-159 mg/dL   High:  160-189 mg/dL   Very High:  >= 190 mg/dL    Non HDL Cholesterol  Desirable:  130 mg/dL  Above Desirable:  130-159 mg/dL  Borderline High:  160-189 mg/dL  High:  190-219 mg/dL  Very High:  Greater than or equal to 220 mg/dL   Hemoglobin A1c     Status: Normal   Result Value Ref Range    Hemoglobin A1C 5.3 0.0 - 5.6 %   PSA, screen     Status: Normal   Result  Value Ref Range    Prostate Specific Antigen Screen 0.90 0.00 - 4.00 ug/L   Uric acid     Status: Abnormal   Result Value Ref Range    Uric Acid 7.4 (H) 3.5 - 7.2 mg/dL       ASSESSMENT/PLAN:     (Z00.00) Routine general medical examination at a health care facility  (primary encounter diagnosis)  Comment: *Reviewed the need for periodic healthcare exams and screenings.  Plan: REVIEW OF HEALTH MAINTENANCE PROTOCOL ORDERS        Advise yearly physical.    (M79.671,  M79.672) Bilateral foot pain  Comment: Chronic, ongoing.  Refer to foot specialist for consultation.  Plan: Orthopedic  Referral        Advised that if other treatment options are not available, will consider pain management.    (I48.0) Paroxysmal atrial fibrillation (H)  Comment: Clinically, appears to be in sinus rhythm, by history, no recurrence.  Plan: Monitor.  No indication for anticoagulation at this time.    (M10.9) Acute gouty arthritis  Comment: Uric acid elevated, prednisone and pain pills given to him in case he has a flare.  We will increase the dose of his allopurinol to 300 mg daily from 100 mg daily.  Excellent kidney function.  Plan: Uric acid        Monitor.  Will check uric acid level at next visit.    (Q23.1) Bicuspid aortic valve  Comment: No evidence of failure.  Plan: Echocardiogram Complete        We will refer for echocardiogram.    (I35.0) Nonrheumatic aortic valve stenosis  Comment: See above.  Plan: Echocardiogram Complete      (Z12.11) Colon cancer screening  Plan: Adult Gastro Ref - Procedure Only      (Z12.5) Screening for prostate cancer  Comment: Reassuringly low PSA.  Plan: PSA, screen        Repeat yearly until age 70.    (R73.09) Abnormal glucose  Comment: No signs of diabetes.  Plan: Comprehensive metabolic panel (BMP + Alb, Alk         Phos, ALT, AST, Total. Bili, TP), Hemoglobin         A1c        Monitor.    (Z13.6) CARDIOVASCULAR SCREENING; LDL GOAL LESS THAN 160  Comment: Elevated but acceptable lipid  "panel without statin therapy.  Good HDL.  Plan: Lipid panel reflex to direct LDL Fasting        Monitor.    Patient Instructions     Preventive Health Recommendations  Male Ages 50 - 64    For the feet, see a foot specialist.   DAY Rosen MD, [facility]  [address] [place] [address]   ~10 mi  [phone]    Blood tests today.     Time for a colonoscopy. If you have not heard from the scheduling office within 2 business days, please call [phone].    Call to set up an echocardiogram: [phone].     Time to quit chewing, cut down on alcohol by 1/2.     First shingles shot, second and final booster in 2-6 months.     Refill on prednisone and hydrocodone.     Patient has been advised of split billing requirements and indicates understanding: Yes    COUNSELING:   Reviewed preventive health counseling, as reflected in patient instructions       Regular exercise       Healthy diet/nutrition       Vision screening       Alcohol Use        Colorectal cancer screening       Prostate cancer screening    Estimated body mass index is 25.84 kg/m  as calculated from the following:    Height as of this encounter: 1.805 m (5' 11.06\").    Weight as of this encounter: 84.2 kg (185 lb 9.6 oz).     Weight management plan: Discussed healthy diet and exercise guidelines    He reports that he quit smoking about 15 years ago. His smoking use included dip, chew, snus or snuff. His smokeless tobacco use includes chew.      Counseling Resources:  ATP IV Guidelines  Pooled Cohorts Equation Calculator  FRAX Risk Assessment  ICSI Preventive Guidelines  Dietary Guidelines for Americans, 2010  USDA's MyPlate  ASA Prophylaxis  Lung CA Screening    Leah Stringer MD  [facility]  "

## 2022-03-20 RX ORDER — ALLOPURINOL 300 MG/1
300 TABLET ORAL DAILY
Qty: 90 TABLET | Refills: 3 | Status: SHIPPED | OUTPATIENT
Start: 2022-03-20 | End: 2023-07-24

## 2022-04-13 DIAGNOSIS — Z11.59 ENCOUNTER FOR SCREENING FOR OTHER VIRAL DISEASES: Primary | ICD-10-CM

## 2022-04-19 ENCOUNTER — HOSPITAL ENCOUNTER (OUTPATIENT)
Dept: CARDIOLOGY | Facility: CLINIC | Age: 57
Discharge: HOME | End: 2022-04-19
Attending: FAMILY MEDICINE | Admitting: FAMILY MEDICINE
Payer: COMMERCIAL

## 2022-04-19 DIAGNOSIS — Q23.81 BICUSPID AORTIC VALVE: ICD-10-CM

## 2022-04-19 DIAGNOSIS — I35.0 NONRHEUMATIC AORTIC VALVE STENOSIS: ICD-10-CM

## 2022-04-19 LAB — LVEF ECHO: NORMAL

## 2022-04-19 PROCEDURE — 93306 TTE W/DOPPLER COMPLETE: CPT

## 2022-04-19 PROCEDURE — 93306 TTE W/DOPPLER COMPLETE: CPT | Mod: 26 | Performed by: INTERNAL MEDICINE

## 2022-04-19 NOTE — LETTER
2022      Mason Daly  [address] [facility] [address]          Mason,     Your recent echocardiogram was unchanged from the previous study on 2019.  Please see enclosed copy.  Nothing has progressed with your bicuspid aortic valve.  Good news.  We should repeat your echocardiogram in 2 years.  Please call, or use [employer], with any questions or concerns.       Resulted Orders   Echocardiogram Complete   Result Value Ref Range    LVEF  55-60%     Narrative    [id number]  [id number]  [id number]  [id number]^ELENA^BAYRON^MAX     [facility]  Echocardiography Laboratory  [address] [place] [address]     Name: AIDA DALY  MRN: [id number]  : 1965  Study Date: 2022 09:27 AM  Age: 56 yrs  Gender: Male  Patient Location: [facility]  Reason For Study: Bicuspid aortic valve, Nonrheumatic aortic valve stenosis  Ordering Physician: BAYRON PARKER  Referring Physician: BAYRON PARKER  Performed By: Babs Woods RDCS     BSA: 2.0 m2  Height: 71 in  Weight: 185 lb  HR: 58  BP: 128/76 mmHg  ______________________________________________________________________________  Procedure  Complete Echo Adult.  ______________________________________________________________________________  Interpretation Summary     Left ventricular systolic function is normal. The visual ejection fraction is  estimated at 55-60%.  Bicuspid aortic valve with a complete LCC/RCC raphe.  Mild valvular aortic stenosis. with mean gradient of 19 mmHg and TRISH of 1.5  cm2.  The ascending aorta is mildly dilated.  Normal pulmonary artery pressure.  Compared to the study from 2019, the degree of aortic stenosis appears  similar.  ______________________________________________________________________________  Left Ventricle  The left ventricle is normal in size. There is normal left ventricular wall  thickness. Left ventricular systolic function is normal. The visual ejection  fraction is  estimated at 55-60%. Diastolic Doppler findings (E/E' ratio and/or  other parameters) suggest left ventricular filling pressures are  indeterminate.     Right Ventricle  The right ventricle is normal in size and function.     Atria  Normal left atrial size. Right atrial size is normal. There is no color  Doppler evidence of an atrial shunt.     Mitral Valve  There is trace mitral regurgitation.     Tricuspid Valve  There is trace tricuspid regurgitation. The right ventricular systolic  pressure is approximated at 19.3 mmHg plus the right atrial pressure.     Aortic Valve  Bicuspid aortic valve with a complete LCC/RCC raphe. There is trace aortic  regurgitation. The calculated aortic valve are is 1.5 cm^2. The peak AoV  pressure gradient is 42.6 mmHg. The mean AoV pressure gradient is 19.1 mmHg.  Mild valvular aortic stenosis.     Pulmonic Valve  There is trace pulmonic valvular regurgitation.     Vessels  The aortic root is normal size. The ascending aorta is Mildly dilated. The  inferior vena cava was normal in size with preserved respiratory variability.     Pericardium  There is no pericardial effusion.     Rhythm  Sinus rhythm was noted.  ______________________________________________________________________________  MMode/2D Measurements & Calculations  IVSd: 1.2 cm     LVIDd: 5.2 cm  LVIDs: 3.1 cm  LVPWd: 1.2 cm  FS: 40.9 %  LV mass(C)d: 242.1 grams  LV mass(C)dI: 118.7 grams/m2  Ao root diam: 3.5 cm  asc Aorta Diam: 4.1 cm  LVOT diam: 2.4 cm  LVOT area: 4.7 cm2  LA Volume Index (BP): 20.6 ml/m2  RWT: 0.45  TAPSE: 2.2 cm     Doppler Measurements & Calculations  MV E max jessica: 61.7 cm/sec  MV A max jessica: 55.9 cm/sec  MV E/A: 1.1  MV dec slope: 325.3 cm/sec2  MV dec time: 0.19 sec  Ao V2 max: 326.2 cm/sec  Ao max P.6 mmHg  Ao V2 mean: 200.4 cm/sec  Ao mean P.1 mmHg  Ao V2 VTI: 66.7 cm  TRISH(I,D): 1.5 cm2  TRISH(V,D): 1.4 cm2  LV V1 max PG: 3.6 mmHg  LV V1 max: 95.5 cm/sec  LV V1 VTI: 21.2 cm  SV(LVOT): 98.9  ml  SI(LVOT): 48.5 ml/m2  TR max edward: 219.5 cm/sec  TR max P.3 mmHg  AV Edward Ratio (DI): 0.29  TRISH Index (cm2/m2): 0.73  E/E' av.5  Lateral E/e': 8.7  Medial E/e': 10.4     ______________________________________________________________________________  Report approved by: Dejuan Nagel 2022 10:44 AM             If you have any questions or concerns, please call the clinic at the number listed above.       Sincerely,      Leah Stringer MD/aw

## 2022-04-29 ENCOUNTER — TELEPHONE (OUTPATIENT)
Dept: FAMILY MEDICINE | Facility: CLINIC | Age: 57
End: 2022-04-29
Payer: COMMERCIAL

## 2022-04-29 NOTE — TELEPHONE ENCOUNTER
Reason for Call:  Result of echocardiogram explained please    Detailed comments: PLEASE someone () interpret his EKG. He got report on mychart but needs to have it explained to him please    Phone Number Patient can be reached at: Home number on file [phone] (home)    Best Time: any    Can we leave a detailed message on this number? YES    Call taken on 4/29/2022 at 10:02 AM by Jessenia Trent

## 2022-05-13 ENCOUNTER — LAB (OUTPATIENT)
Dept: LAB | Facility: CLINIC | Age: 57
End: 2022-05-13
Payer: COMMERCIAL

## 2022-05-13 ENCOUNTER — ANESTHESIA EVENT (OUTPATIENT)
Dept: GASTROENTEROLOGY | Facility: CLINIC | Age: 57
End: 2022-05-13
Payer: COMMERCIAL

## 2022-05-13 DIAGNOSIS — Z11.59 ENCOUNTER FOR SCREENING FOR OTHER VIRAL DISEASES: ICD-10-CM

## 2022-05-13 PROCEDURE — U0005 INFEC AGEN DETEC AMPLI PROBE: HCPCS

## 2022-05-13 PROCEDURE — U0003 INFECTIOUS AGENT DETECTION BY NUCLEIC ACID (DNA OR RNA); SEVERE ACUTE RESPIRATORY SYNDROME CORONAVIRUS 2 (SARS-COV-2) (CORONAVIRUS DISEASE [COVID-19]), AMPLIFIED PROBE TECHNIQUE, MAKING USE OF HIGH THROUGHPUT TECHNOLOGIES AS DESCRIBED BY CMS-2020-01-R: HCPCS

## 2022-05-13 ASSESSMENT — LIFESTYLE VARIABLES: TOBACCO_USE: 1

## 2022-05-13 ASSESSMENT — ENCOUNTER SYMPTOMS: DYSRHYTHMIAS: 1

## 2022-05-13 NOTE — ANESTHESIA PREPROCEDURE EVALUATION
Anesthesia Pre-Procedure Evaluation    Patient: Favio Jacinto   MRN: [id number] : 1965        Procedure : Procedure(s):  COLONOSCOPY          Past Medical History:   Diagnosis Date     Sinusitis       Past Surgical History:   Procedure Laterality Date     ESOPHAGOSCOPY, GASTROSCOPY, DUODENOSCOPY (EGD), COMBINED N/A 10/3/2019    Procedure: ESOPHAGOGASTRODUODENOSCOPY, WITH BIOPSY;  Surgeon: Favio Devries MD;  Location: [facility]     HERNIA REPAIR, INGUINAL RT/LT       ZZHC ARTHROTOMY W/OPEN MENISCUS REPAIR        Allergies   Allergen Reactions     Pcn [Penicillins]       Social History     Tobacco Use     Smoking status: Former Smoker     Types: Dip, chew, snus or snuff     Quit date: 2007     Years since quitting: 15.3     Smokeless tobacco: Current User     Types: Chew   Substance Use Topics     Alcohol use: Yes     Comment: occasional      Wt Readings from Last 1 Encounters:   22 84.2 kg (185 lb 9.6 oz)        Anesthesia Evaluation   Pt has had prior anesthetic.         ROS/MED HX  ENT/Pulmonary:     (+) tobacco use,     Neurologic:  - neg neurologic ROS     Cardiovascular:  - neg cardiovascular ROS   (+) -----dysrhythmias, a-fib, valvular problems/murmurs type: AS Previous cardiac testing   Echo: Date: 22 Results:  Interpretation Summary     Left ventricular systolic function is normal. The visual ejection fraction is  estimated at 55-60%.  Bicuspid aortic valve with a complete LCC/RCC raphe.  Mild valvular aortic stenosis. with mean gradient of 19 mmHg and TRISH of 1.5  cm2.  The ascending aorta is mildly dilated.  Normal pulmonary artery pressure.  Compared to the study from 2019, the degree of aortic stenosis appears  similar.  ______________________________________________________________________________  Left Ventricle  The left ventricle is normal in size. There is normal left ventricular wall  thickness. Left ventricular systolic function is normal. The visual ejection  fraction  is estimated at 55-60%. Diastolic Doppler findings (E/E' ratio and/or  other parameters) suggest left ventricular filling pressures are  indeterminate.     Right Ventricle  The right ventricle is normal in size and function.     Atria  Normal left atrial size. Right atrial size is normal. There is no color  Doppler evidence of an atrial shunt.     Mitral Valve  There is trace mitral regurgitation.     Tricuspid Valve  There is trace tricuspid regurgitation. The right ventricular systolic  pressure is approximated at 19.3 mmHg plus the right atrial pressure.     Aortic Valve  Bicuspid aortic valve with a complete LCC/RCC raphe. There is trace aortic  regurgitation. The calculated aortic valve are is 1.5 cm^2. The peak AoV  pressure gradient is 42.6 mmHg. The mean AoV pressure gradient is 19.1 mmHg.  Mild valvular aortic stenosis.     Pulmonic Valve  There is trace pulmonic valvular regurgitation.     Vessels  The aortic root is normal size. The ascending aorta is Mildly dilated. The  inferior vena cava was normal in size with preserved respiratory variability.     Pericardium  There is no pericardial effusion.     Rhythm  Sinus rhythm was noted.  Stress Test: Date: Results:    ECG Reviewed: Date: Results:    Cath: Date: Results:      METS/Exercise Tolerance: >4 METS    Hematologic:       Musculoskeletal:       GI/Hepatic:     (+) GERD, bowel prep,     Renal/Genitourinary:       Endo:       Psychiatric/Substance Use:  - neg psychiatric ROS     Infectious Disease:       Malignancy:       Other: Comment: Gout            Physical Exam    Airway  airway exam normal      Mallampati: I   TM distance: > 3 FB   Neck ROM: full   Mouth opening: > 3 cm    Respiratory Devices and Support         Dental  no notable dental history         Cardiovascular   cardiovascular exam normal          Pulmonary   pulmonary exam normal                OUTSIDE LABS:  CBC:   Lab Results   Component Value Date    WBC 6.9 09/25/2019    WBC 5.6  11/29/2011    HGB 15.1 09/25/2019    HGB 15.8 08/12/2014    HCT 44.4 09/25/2019    HCT 45.1 08/12/2014     09/25/2019     08/12/2014     BMP:   Lab Results   Component Value Date     03/18/2022     08/28/2020    POTASSIUM 5.0 03/18/2022    POTASSIUM 4.2 08/28/2020    CHLORIDE 110 (H) 03/18/2022    CHLORIDE 108 08/28/2020    CO2 25 03/18/2022    CO2 28 08/28/2020    BUN 17 03/18/2022    BUN 11 08/28/2020    CR 1.00 03/18/2022    CR 0.99 08/28/2020    GLC 96 03/18/2022    GLC 91 08/28/2020     COAGS:   Lab Results   Component Value Date    PTT 26 04/12/2010    INR 0.9 08/13/2014     POC: No results found for: BGM, HCG, HCGS  HEPATIC:   Lab Results   Component Value Date    ALBUMIN 4.3 03/18/2022    PROTTOTAL 7.3 03/18/2022    ALT 27 03/18/2022    AST 19 03/18/2022    ALKPHOS 55 03/18/2022    BILITOTAL 0.6 03/18/2022     OTHER:   Lab Results   Component Value Date    A1C 5.3 03/18/2022    GRACE 8.8 03/18/2022    LIPASE 262 09/25/2019    TSH 2.0 08/12/2014       Anesthesia Plan    ASA Status:  3      Anesthesia Type: General.              Consents    Anesthesia Plan(s) and associated risks, benefits, and realistic alternatives discussed. Questions answered and patient/representative(s) expressed understanding.     - Discussed: Risks, Benefits and Alternatives for BOTH SEDATION and the PROCEDURE were discussed     - Discussed with:  Patient         Postoperative Care            Comments:                DEBBIE Thakkar CRNA

## 2022-05-14 LAB — SARS-COV-2 RNA RESP QL NAA+PROBE: NEGATIVE

## 2022-05-16 ENCOUNTER — HOSPITAL ENCOUNTER (OUTPATIENT)
Facility: CLINIC | Age: 57
Discharge: HOME | End: 2022-05-16
Attending: SURGERY | Admitting: SURGERY
Payer: COMMERCIAL

## 2022-05-16 ENCOUNTER — ANESTHESIA (OUTPATIENT)
Dept: GASTROENTEROLOGY | Facility: CLINIC | Age: 57
End: 2022-05-16
Payer: COMMERCIAL

## 2022-05-16 VITALS
SYSTOLIC BLOOD PRESSURE: 106 MMHG | RESPIRATION RATE: 16 BRPM | HEIGHT: 71 IN | DIASTOLIC BLOOD PRESSURE: 65 MMHG | TEMPERATURE: 98.5 F | BODY MASS INDEX: 25.9 KG/M2 | WEIGHT: 185 LBS | HEART RATE: 72 BPM | OXYGEN SATURATION: 97 %

## 2022-05-16 LAB — COLONOSCOPY: NORMAL

## 2022-05-16 PROCEDURE — 88305 TISSUE EXAM BY PATHOLOGIST: CPT | Mod: TC | Performed by: SURGERY

## 2022-05-16 PROCEDURE — 45380 COLONOSCOPY AND BIOPSY: CPT | Performed by: SURGERY

## 2022-05-16 PROCEDURE — 88305 TISSUE EXAM BY PATHOLOGIST: CPT | Mod: 26 | Performed by: PATHOLOGY

## 2022-05-16 PROCEDURE — 250N000011 HC RX IP 250 OP 636: Performed by: NURSE ANESTHETIST, CERTIFIED REGISTERED

## 2022-05-16 PROCEDURE — 370N000017 HC ANESTHESIA TECHNICAL FEE, PER MIN: Performed by: SURGERY

## 2022-05-16 PROCEDURE — 250N000009 HC RX 250: Performed by: SURGERY

## 2022-05-16 PROCEDURE — 45380 COLONOSCOPY AND BIOPSY: CPT | Mod: PT | Performed by: SURGERY

## 2022-05-16 PROCEDURE — 258N000003 HC RX IP 258 OP 636: Performed by: SURGERY

## 2022-05-16 RX ORDER — SODIUM CHLORIDE, SODIUM LACTATE, POTASSIUM CHLORIDE, CALCIUM CHLORIDE 600; 310; 30; 20 MG/100ML; MG/100ML; MG/100ML; MG/100ML
INJECTION, SOLUTION INTRAVENOUS CONTINUOUS
Status: DISCONTINUED | OUTPATIENT
Start: 2022-05-16 | End: 2022-05-16 | Stop reason: HOSPADM

## 2022-05-16 RX ORDER — PROPOFOL 10 MG/ML
INJECTION, EMULSION INTRAVENOUS PRN
Status: DISCONTINUED | OUTPATIENT
Start: 2022-05-16 | End: 2022-05-16

## 2022-05-16 RX ORDER — LIDOCAINE 40 MG/G
CREAM TOPICAL
Status: DISCONTINUED | OUTPATIENT
Start: 2022-05-16 | End: 2022-05-16 | Stop reason: HOSPADM

## 2022-05-16 RX ADMIN — PROPOFOL 150 MG: 10 INJECTION, EMULSION INTRAVENOUS at 08:24

## 2022-05-16 RX ADMIN — LIDOCAINE HYDROCHLORIDE 0.1 ML: 10 INJECTION, SOLUTION EPIDURAL; INFILTRATION; INTRACAUDAL; PERINEURAL at 07:38

## 2022-05-16 RX ADMIN — SODIUM CHLORIDE, POTASSIUM CHLORIDE, SODIUM LACTATE AND CALCIUM CHLORIDE: 600; 310; 30; 20 INJECTION, SOLUTION INTRAVENOUS at 07:37

## 2022-05-16 RX ADMIN — PROPOFOL 100 MG: 10 INJECTION, EMULSION INTRAVENOUS at 08:26

## 2022-05-16 RX ADMIN — PROPOFOL 150 MG: 10 INJECTION, EMULSION INTRAVENOUS at 08:25

## 2022-05-16 NOTE — LETTER
Favio Jacinto  [address] [facility] [address]    May 22, 2022    Dear Favio,  This letter is written to inform you of the results of your recent colonoscopy.  Your examination showed polyp(s) in your rectum. All polyps were removed in their entirety and sent for review by a pathologist. As you will see on the pathology report below, the tissue(s) were hyperplastic polyps. Your examination was otherwise without abnormality.    Final Diagnosis   A. Colon, Rectum, polyp, polypectomy:  -Hyperplastic polyp  -Negative for dysplasia or malignancy.       Hyperplastic polyps are entirely benign (non-cancerous) and rarely associated with the development of additional polyps or colorectal cancer.    Given these findings,  I recommend that you undergo a repeat colonoscopy in ten years for screening. We will enter you into a recall system so you receive a reminder closer to the time that you are due for repeat examination.     Please remember that this recommendation is made with the understanding that you are not experiencing persistent changes in bowel function, bleeding per rectum, and/or significant abdominal pain. If you experience these symptoms, please contact your primary care provider for a further evaluation.     If you have any questions or concerns about the results of your colonoscopy or the appropriate follow-up, please contact my assistant at [phone].      Sincerely,        [facility],   [facility]  ___

## 2022-05-16 NOTE — ANESTHESIA POSTPROCEDURE EVALUATION
Patient: Favio Jacinto    Procedure: Procedure(s):  COLONOSCOPY, WITH POLYPECTOMY AND BIOPSY       Anesthesia Type:  General    Note:  Disposition: Outpatient   Postop Pain Control: Uneventful            Sign Out: Well controlled pain   PONV: No   Neuro/Psych: Uneventful            Sign Out: Acceptable/Baseline neuro status   Airway/Respiratory: Uneventful            Sign Out: Acceptable/Baseline resp. status   CV/Hemodynamics: Uneventful            Sign Out: Acceptable CV status; No obvious hypovolemia; No obvious fluid overload   Other NRE: NONE   DID A NON-ROUTINE EVENT OCCUR? No           Last vitals:  Vitals Value Taken Time   /65 05/16/22 0840   Temp     Pulse 72 05/16/22 0840   Resp     SpO2 97 % 05/16/22 0843   Vitals shown include unvalidated device data.    Electronically Signed By: DEBBIE Elizondo CRNA  May 16, 2022  8:44 AM

## 2022-05-16 NOTE — H&P
[facility]    Pre-Endoscopy History and Physical     Favio Jacinto MRN# [id number]   YOB: 1965 Age: 56 year old     Date of Procedure: 5/16/2022  Primary care provider: Leah Stringer  Type of Endoscopy: Colonoscopy with possible biopsy, possible polypectomy  Reason for Procedure: screening  Type of Anesthesia Anticipated: MAC    HPI:    Favio is a 56 year old male who will be undergoing the above procedure.  last colonoscopy 2014 - 2cm polyp - hyperplastic polyp; no blood thinner    A history and physical has been performed. The patient's medications and allergies have been reviewed. The risks and benefits of the procedure and the sedation options and risks were discussed with the patient.  All questions were answered and informed consent was obtained.      He denies a personal or family history of anesthesia complications or bleeding disorders.     Patient Active Problem List   Diagnosis     Bicuspid aortic valve     Aortic stenosis     GERD (gastroesophageal reflux disease)     Ex-smoker     CARDIOVASCULAR SCREENING; LDL GOAL LESS THAN 160     Gout, chronic     Polyp of colon, hyperplastic     Advanced directives, counseling/discussion     Atrial fibrillation (H)        Past Medical History:   Diagnosis Date     Sinusitis         Past Surgical History:   Procedure Laterality Date     ESOPHAGOSCOPY, GASTROSCOPY, DUODENOSCOPY (EGD), COMBINED N/A 10/3/2019    Procedure: ESOPHAGOGASTRODUODENOSCOPY, WITH BIOPSY;  Surgeon: Favio Devries MD;  Location: [facility]     HERNIA REPAIR, INGUINAL RT/LT       ZHC ARTHROTOMY W/OPEN MENISCUS REPAIR         Social History     Tobacco Use     Smoking status: Former Smoker     Types: Dip, chew, snus or snuff     Quit date: 1/1/2007     Years since quitting: 15.3     Smokeless tobacco: Current User     Types: Chew   Substance Use Topics     Alcohol use: Yes     Comment: occasional       Family History   Problem Relation Age of Onset      "Cancer Father          at 60, smoker lung     Cancer Brother         testicular cancer, doing well. Skin cancer     Other Cancer Brother         Liver and lung     Respiratory Mother         asthma-uses Advair       Prior to Admission medications    Medication Sig Start Date End Date Taking? Authorizing Provider   allopurinol (ZYLOPRIM) 300 MG tablet Take 1 tablet (300 mg) by mouth daily To prevent gout. 3/20/22  Yes Leah Stringer MD   predniSONE (DELTASONE) 20 MG tablet TAKE 2 TABLETS BY MOUTH DAILY FOR ACUTE GOUT PAIN 3/18/22  Yes Leah Stringer MD       Allergies   Allergen Reactions     Pcn [Penicillins]         REVIEW OF SYSTEMS:   5 point ROS negative except as noted above in HPI, including Gen., Resp., CV, GI &  system review.    PHYSICAL EXAM:   /84 (BP Location: Right arm)   Pulse 56   Temp 98.5  F (36.9  C) (Oral)   Ht 1.805 m (5' 11.06\")   Wt 83.9 kg (185 lb)   SpO2 98%   BMI 25.76 kg/m   Estimated body mass index is 25.76 kg/m  as calculated from the following:    Height as of this encounter: 1.805 m (5' 11.06\").    Weight as of this encounter: 83.9 kg (185 lb).   Constitutional: Awake, alert, no acute distress.  Eyes: No scleral icterus.  Conjunctiva are without injection.  ENMT: Mucous membranes moist, dentition and gums are intact.   Neck: Soft, supple, trachea midline.    Endocrine: n/a   Lymphatic: There is no cervical, submandibularadenopathy.  Respiratory: normal effortgs   Cardiovascular: S1, S2  Abdomen: Non-distended, non-tender,  No masses,  Musculoskeletal: No spinal or CVA tenderness. Full range of motion in the upper and lower extremities.    Skin: No skin rashes or lesions to inspection.  No petechia.    Neurologic: alerted and oriented 3x  Psychiatric: The patient's affect is not blunted and mood is appropriate.  DIAGNOSTICS:    Not indicated    IMPRESSION   ASA Class 2 - Mild systemic disease    PLAN:   Plan for Colonoscopy with possible biopsy, possible " polypectomy. We discussed the risks, benefits and alternatives and the patient wished to proceed.  Patient is cleared for the above procedure.    The above has been forwarded to the consulting provider.    [facility], [facility]

## 2022-05-16 NOTE — ANESTHESIA CARE TRANSFER NOTE
Patient: Favio Jacinto    Procedure: Procedure(s):  COLONOSCOPY, WITH POLYPECTOMY AND BIOPSY       Diagnosis: Colon cancer screening [Z12.11]  Diagnosis Additional Information: No value filed.    Anesthesia Type:   General     Note:    Oropharynx: oropharynx clear of all foreign objects  Level of Consciousness: awake  Oxygen Supplementation: room air    Independent Airway: airway patency satisfactory and stable  Dentition: dentition unchanged  Vital Signs Stable: post-procedure vital signs reviewed and stable  Report to RN Given: handoff report given  Patient transferred to: Phase II    Handoff Report: Identifed the Patient, Identified the Reponsible Provider, Reviewed the pertinent medical history, Discussed the surgical course, Reviewed Intra-OP anesthesia mangement and issues during anesthesia, Set expectations for post-procedure period and Allowed opportunity for questions and acknowledgement of understanding      Vitals:  Vitals Value Taken Time   BP     Temp     Pulse     Resp     SpO2         Electronically Signed By: DEBBIE Elizondo CRNA  May 16, 2022  8:39 AM

## 2022-05-17 LAB
PATH REPORT.COMMENTS IMP SPEC: NORMAL
PATH REPORT.COMMENTS IMP SPEC: NORMAL
PATH REPORT.FINAL DX SPEC: NORMAL
PATH REPORT.GROSS SPEC: NORMAL
PATH REPORT.MICROSCOPIC SPEC OTHER STN: NORMAL
PATH REPORT.RELEVANT HX SPEC: NORMAL
PHOTO IMAGE: NORMAL

## 2022-06-07 ENCOUNTER — TELEPHONE (OUTPATIENT)
Dept: FAMILY MEDICINE | Facility: CLINIC | Age: 57
End: 2022-06-07
Payer: COMMERCIAL

## 2022-06-07 DIAGNOSIS — K21.9 GASTROESOPHAGEAL REFLUX DISEASE, UNSPECIFIED WHETHER ESOPHAGITIS PRESENT: Primary | ICD-10-CM

## 2022-06-07 DIAGNOSIS — M10.9 ACUTE GOUTY ARTHRITIS: ICD-10-CM

## 2022-06-07 NOTE — TELEPHONE ENCOUNTER
I don't see upper GI/scope documented as discussed but patient indicates it was.    He recently had colonoscopy.    Routed to Dr. Stringer to address patient request for referral.    Emma Packer RN  [facility]

## 2022-06-07 NOTE — TELEPHONE ENCOUNTER
Reason for Call:  Other     Detailed comments: Patient is needing a referral for a scope of his throat as discussed with provider    Phone Number Patient can be reached at: Cell number on file:    Telephone Information:   Mobile [phone]       Best Time:     Can we leave a detailed message on this number? YES    Call taken on 6/7/2022 at 2:30 PM by Becky Rivera

## 2022-06-07 NOTE — TELEPHONE ENCOUNTER
Requested Prescriptions   Pending Prescriptions Disp Refills     HYDROcodone-acetaminophen (NORCO) 5-325 MG tablet 40 tablet 0     Sig: Take 1-2 tablets by mouth q 4 hours prn severe pain.       There is no refill protocol information for this order        Routing refill request to provider for review/approval because:  Drug not on the [facility] refill protocol     Emma Packer RN  [facility]

## 2022-06-07 NOTE — TELEPHONE ENCOUNTER
Reason for Call:  Other     Detailed comments: Patient is needing a refill on hydrocodone 5 - 325 mg    Phone Number Patient can be reached at: Cell number on file:    Telephone Information:   Mobile [phone]       Best Time:     Can we leave a detailed message on this number? YES    Call taken on 6/7/2022 at 2:29 PM by Becky Rivera

## 2022-06-08 RX ORDER — HYDROCODONE BITARTRATE AND ACETAMINOPHEN 5; 325 MG/1; MG/1
TABLET ORAL
Qty: 40 TABLET | Refills: 0 | Status: SHIPPED | OUTPATIENT
Start: 2022-06-08 | End: 2022-08-19

## 2022-06-08 NOTE — TELEPHONE ENCOUNTER
Orders for upper endoscopy placed.  Our scheduling office will be contacting him.  Please update patient.  Thank you.

## 2022-06-23 ENCOUNTER — ANESTHESIA EVENT (OUTPATIENT)
Dept: GASTROENTEROLOGY | Facility: CLINIC | Age: 57
End: 2022-06-23
Payer: COMMERCIAL

## 2022-06-29 ASSESSMENT — ENCOUNTER SYMPTOMS: DYSRHYTHMIAS: 1

## 2022-06-29 ASSESSMENT — LIFESTYLE VARIABLES: TOBACCO_USE: 1

## 2022-06-29 NOTE — ANESTHESIA PREPROCEDURE EVALUATION
Anesthesia Pre-Procedure Evaluation    Patient: Favio Jacinto   MRN: [id number] : 1965        Procedure : Procedure(s):  ESOPHAGOGASTRODUODENOSCOPY (EGD)          Past Medical History:   Diagnosis Date     Sinusitis       Past Surgical History:   Procedure Laterality Date     COLONOSCOPY N/A 2022    Procedure: COLONOSCOPY, WITH POLYPECTOMY AND BIOPSY;  Surgeon: Parker Washington MD;  Location: [facility]     ESOPHAGOSCOPY, GASTROSCOPY, DUODENOSCOPY (EGD), COMBINED N/A 10/3/2019    Procedure: ESOPHAGOGASTRODUODENOSCOPY, WITH BIOPSY;  Surgeon: Favio Devries MD;  Location: [facility]     HERNIA REPAIR, INGUINAL RT/LT       ZZHC ARTHROTOMY W/OPEN MENISCUS REPAIR        Allergies   Allergen Reactions     Pcn [Penicillins]       Social History     Tobacco Use     Smoking status: Former Smoker     Types: Dip, chew, snus or snuff     Quit date: 2007     Years since quitting: 15.5     Smokeless tobacco: Current User     Types: Chew   Substance Use Topics     Alcohol use: Yes     Comment: occasional      Wt Readings from Last 1 Encounters:   22 83.9 kg (185 lb)        Anesthesia Evaluation   Pt has had prior anesthetic. Type: General and MAC.    No history of anesthetic complications       ROS/MED HX  ENT/Pulmonary:     (+) tobacco use, Past use,     Neurologic:  - neg neurologic ROS     Cardiovascular:     (+) Dyslipidemia -----dysrhythmias, a-fib, valvular problems/murmurs type: AS Previous cardiac testing   Echo: Date: 22 Results:  Interpretation Summary     Left ventricular systolic function is normal. The visual ejection fraction is  estimated at 55-60%.  Bicuspid aortic valve with a complete LCC/RCC raphe.  Mild valvular aortic stenosis. with mean gradient of 19 mmHg and TRISH of 1.5  cm2.  The ascending aorta is mildly dilated.  Normal pulmonary artery pressure.  Compared to the study from 2019, the degree of aortic stenosis  appears  similar.  ______________________________________________________________________________  Left Ventricle  The left ventricle is normal in size. There is normal left ventricular wall  thickness. Left ventricular systolic function is normal. The visual ejection  fraction is estimated at 55-60%. Diastolic Doppler findings (E/E' ratio and/or  other parameters) suggest left ventricular filling pressures are  indeterminate.     Right Ventricle  The right ventricle is normal in size and function.     Atria  Normal left atrial size. Right atrial size is normal. There is no color  Doppler evidence of an atrial shunt.     Mitral Valve  There is trace mitral regurgitation.     Tricuspid Valve  There is trace tricuspid regurgitation. The right ventricular systolic  pressure is approximated at 19.3 mmHg plus the right atrial pressure.     Aortic Valve  Bicuspid aortic valve with a complete LCC/RCC raphe. There is trace aortic  regurgitation. The calculated aortic valve are is 1.5 cm^2. The peak AoV  pressure gradient is 42.6 mmHg. The mean AoV pressure gradient is 19.1 mmHg.  Mild valvular aortic stenosis.     Pulmonic Valve  There is trace pulmonic valvular regurgitation.     Vessels  The aortic root is normal size. The ascending aorta is Mildly dilated. The  inferior vena cava was normal in size with preserved respiratory variability.     Pericardium  There is no pericardial effusion.     Rhythm  Sinus rhythm was noted.  Stress Test: Date: Results:    ECG Reviewed: Date: Results:    Cath: Date: Results:      METS/Exercise Tolerance:     Hematologic:       Musculoskeletal:   (+) arthritis,     GI/Hepatic:     (+) GERD, Symptomatic,     Renal/Genitourinary:  - neg Renal ROS     Endo: Comment: Gout  overweight      Psychiatric/Substance Use:  - neg psychiatric ROS     Infectious Disease:  - neg infectious disease ROS     Malignancy:  - neg malignancy ROS     Other:  - neg other ROS          Physical Exam    Airway   airway exam normal           Respiratory Devices and Support         Dental  no notable dental history         Cardiovascular   cardiovascular exam normal          Pulmonary   pulmonary exam normal                OUTSIDE LABS:  CBC:   Lab Results   Component Value Date    WBC 6.9 09/25/2019    WBC 5.6 11/29/2011    HGB 15.1 09/25/2019    HGB 15.8 08/12/2014    HCT 44.4 09/25/2019    HCT 45.1 08/12/2014     09/25/2019     08/12/2014     BMP:   Lab Results   Component Value Date     03/18/2022     08/28/2020    POTASSIUM 5.0 03/18/2022    POTASSIUM 4.2 08/28/2020    CHLORIDE 110 (H) 03/18/2022    CHLORIDE 108 08/28/2020    CO2 25 03/18/2022    CO2 28 08/28/2020    BUN 17 03/18/2022    BUN 11 08/28/2020    CR 1.00 03/18/2022    CR 0.99 08/28/2020    GLC 96 03/18/2022    GLC 91 08/28/2020     COAGS:   Lab Results   Component Value Date    PTT 26 04/12/2010    INR 0.9 08/13/2014     POC: No results found for: BGM, HCG, HCGS  HEPATIC:   Lab Results   Component Value Date    ALBUMIN 4.3 03/18/2022    PROTTOTAL 7.3 03/18/2022    ALT 27 03/18/2022    AST 19 03/18/2022    ALKPHOS 55 03/18/2022    BILITOTAL 0.6 03/18/2022     OTHER:   Lab Results   Component Value Date    A1C 5.3 03/18/2022    GRACE 8.8 03/18/2022    LIPASE 262 09/25/2019    TSH 2.0 08/12/2014       Anesthesia Plan    ASA Status:  2   NPO Status:  NPO Appropriate    Anesthesia Type: General.     - Airway: Native airway   Induction: Intravenous.   Maintenance: Balanced.        Consents    Anesthesia Plan(s) and associated risks, benefits, and realistic alternatives discussed. Questions answered and patient/representative(s) expressed understanding.     - Discussed: Risks, Benefits and Alternatives for BOTH SEDATION and the PROCEDURE were discussed     - Discussed with:  Patient         Postoperative Care            Comments:                DEBBIE Flores CRNA

## 2022-06-30 ENCOUNTER — HOSPITAL ENCOUNTER (OUTPATIENT)
Facility: CLINIC | Age: 57
Discharge: HOME | End: 2022-06-30
Attending: SURGERY | Admitting: SURGERY
Payer: COMMERCIAL

## 2022-06-30 ENCOUNTER — ANESTHESIA (OUTPATIENT)
Dept: GASTROENTEROLOGY | Facility: CLINIC | Age: 57
End: 2022-06-30
Payer: COMMERCIAL

## 2022-06-30 VITALS
BODY MASS INDEX: 25.9 KG/M2 | WEIGHT: 185 LBS | HEIGHT: 71 IN | TEMPERATURE: 98.6 F | OXYGEN SATURATION: 97 % | HEART RATE: 62 BPM | DIASTOLIC BLOOD PRESSURE: 78 MMHG | SYSTOLIC BLOOD PRESSURE: 109 MMHG

## 2022-06-30 LAB — UPPER GI ENDOSCOPY: NORMAL

## 2022-06-30 PROCEDURE — 250N000009 HC RX 250: Performed by: NURSE ANESTHETIST, CERTIFIED REGISTERED

## 2022-06-30 PROCEDURE — 88305 TISSUE EXAM BY PATHOLOGIST: CPT | Mod: TC | Performed by: SURGERY

## 2022-06-30 PROCEDURE — 43239 EGD BIOPSY SINGLE/MULTIPLE: CPT | Performed by: SURGERY

## 2022-06-30 PROCEDURE — 88305 TISSUE EXAM BY PATHOLOGIST: CPT | Mod: 26 | Performed by: PATHOLOGY

## 2022-06-30 PROCEDURE — 258N000003 HC RX IP 258 OP 636: Performed by: SURGERY

## 2022-06-30 PROCEDURE — 250N000009 HC RX 250: Performed by: SURGERY

## 2022-06-30 PROCEDURE — 250N000011 HC RX IP 250 OP 636: Performed by: NURSE ANESTHETIST, CERTIFIED REGISTERED

## 2022-06-30 PROCEDURE — 370N000017 HC ANESTHESIA TECHNICAL FEE, PER MIN: Performed by: SURGERY

## 2022-06-30 RX ORDER — LIDOCAINE HYDROCHLORIDE 10 MG/ML
INJECTION, SOLUTION INFILTRATION; PERINEURAL PRN
Status: DISCONTINUED | OUTPATIENT
Start: 2022-06-30 | End: 2022-06-30

## 2022-06-30 RX ORDER — SODIUM CHLORIDE, SODIUM LACTATE, POTASSIUM CHLORIDE, CALCIUM CHLORIDE 600; 310; 30; 20 MG/100ML; MG/100ML; MG/100ML; MG/100ML
INJECTION, SOLUTION INTRAVENOUS CONTINUOUS
Status: DISCONTINUED | OUTPATIENT
Start: 2022-06-30 | End: 2022-06-30 | Stop reason: HOSPADM

## 2022-06-30 RX ORDER — PROPOFOL 10 MG/ML
INJECTION, EMULSION INTRAVENOUS PRN
Status: DISCONTINUED | OUTPATIENT
Start: 2022-06-30 | End: 2022-06-30

## 2022-06-30 RX ORDER — PROPOFOL 10 MG/ML
INJECTION, EMULSION INTRAVENOUS CONTINUOUS PRN
Status: DISCONTINUED | OUTPATIENT
Start: 2022-06-30 | End: 2022-06-30

## 2022-06-30 RX ORDER — SODIUM CHLORIDE, SODIUM LACTATE, POTASSIUM CHLORIDE, CALCIUM CHLORIDE 600; 310; 30; 20 MG/100ML; MG/100ML; MG/100ML; MG/100ML
INJECTION, SOLUTION INTRAVENOUS CONTINUOUS
Status: CANCELLED | OUTPATIENT
Start: 2022-06-30

## 2022-06-30 RX ORDER — LIDOCAINE 40 MG/G
CREAM TOPICAL
Status: DISCONTINUED | OUTPATIENT
Start: 2022-06-30 | End: 2022-06-30 | Stop reason: HOSPADM

## 2022-06-30 RX ORDER — ONDANSETRON 2 MG/ML
4 INJECTION INTRAMUSCULAR; INTRAVENOUS EVERY 30 MIN PRN
Status: CANCELLED | OUTPATIENT
Start: 2022-06-30

## 2022-06-30 RX ORDER — ONDANSETRON 4 MG/1
4 TABLET, ORALLY DISINTEGRATING ORAL EVERY 30 MIN PRN
Status: CANCELLED | OUTPATIENT
Start: 2022-06-30

## 2022-06-30 RX ORDER — ALBUTEROL SULFATE 0.83 MG/ML
2.5 SOLUTION RESPIRATORY (INHALATION) EVERY 4 HOURS PRN
Status: CANCELLED | OUTPATIENT
Start: 2022-06-30

## 2022-06-30 RX ADMIN — PROPOFOL 50 MG: 10 INJECTION, EMULSION INTRAVENOUS at 11:48

## 2022-06-30 RX ADMIN — LIDOCAINE HYDROCHLORIDE 0.1 ML: 10 INJECTION, SOLUTION EPIDURAL; INFILTRATION; INTRACAUDAL; PERINEURAL at 11:23

## 2022-06-30 RX ADMIN — SODIUM CHLORIDE, POTASSIUM CHLORIDE, SODIUM LACTATE AND CALCIUM CHLORIDE: 600; 310; 30; 20 INJECTION, SOLUTION INTRAVENOUS at 11:23

## 2022-06-30 RX ADMIN — LIDOCAINE HYDROCHLORIDE 100 MG: 10 INJECTION, SOLUTION INFILTRATION; PERINEURAL at 11:48

## 2022-06-30 RX ADMIN — TOPICAL ANESTHETIC 1 SPRAY: 200 SPRAY DENTAL; PERIODONTAL at 11:46

## 2022-06-30 RX ADMIN — PROPOFOL 200 MCG/KG/MIN: 10 INJECTION, EMULSION INTRAVENOUS at 11:47

## 2022-06-30 NOTE — ANESTHESIA CARE TRANSFER NOTE
Patient: Favio Jacinto    Procedure: Procedure(s):  ESOPHAGOGASTRODUODENOSCOPY, WITH BIOPSY       Diagnosis: Gastroesophageal reflux disease, unspecified whether esophagitis present [K21.9]  Diagnosis Additional Information: No value filed.    Anesthesia Type:   General     Note:    Oropharynx: oropharynx clear of all foreign objects and spontaneously breathing  Level of Consciousness: drowsy  Oxygen Supplementation: room air    Independent Airway: airway patency satisfactory and stable  Dentition: dentition unchanged  Vital Signs Stable: post-procedure vital signs reviewed and stable  Report to RN Given: handoff report given  Patient transferred to: Phase II    Handoff Report: Identifed the Patient, Identified the Reponsible Provider, Reviewed the pertinent medical history, Discussed the surgical course, Reviewed Intra-OP anesthesia mangement and issues during anesthesia, Set expectations for post-procedure period and Allowed opportunity for questions and acknowledgement of understanding      Vitals:  Vitals Value Taken Time   BP     Temp     Pulse     Resp     SpO2         Electronically Signed By: DEBBIE Pan CRNA  June 30, 2022  12:00 PM

## 2022-06-30 NOTE — ANESTHESIA POSTPROCEDURE EVALUATION
Patient: Favio Jacinto    Procedure: Procedure(s):  ESOPHAGOGASTRODUODENOSCOPY, WITH BIOPSY       Anesthesia Type:  General    Note:  Disposition: Outpatient   Postop Pain Control: Uneventful            Sign Out: Well controlled pain   PONV: No   Neuro/Psych: Uneventful            Sign Out: Acceptable/Baseline neuro status   Airway/Respiratory: Uneventful            Sign Out: Acceptable/Baseline resp. status   CV/Hemodynamics: Uneventful            Sign Out: Acceptable CV status; No obvious hypovolemia; No obvious fluid overload   Other NRE: NONE   DID A NON-ROUTINE EVENT OCCUR? No           Last vitals:  Vitals:    06/30/22 1030   BP: 109/74   Pulse: 61   Temp: 37  C (98.6  F)   SpO2: 99%       Electronically Signed By: DEBBIE Pan CRNA  June 30, 2022  12:01 PM

## 2022-06-30 NOTE — LETTER
Favio Jacinto  [address] [address]  July 5, 2022    Dear Favio,   This letter is to inform you of the results of your pathology report on your upper endoscopy (EGD).    Your pathology report was:  Final Diagnosis   Gastroesophageal junction: Biopsy:  - Squamous mucosa with basal cell hyperplasia and increased intraepithelial eosinophils, focally greater than 100 eosinophils/HPF, see comment  - No columnar epithelium seen     Electronically signed by Dmitry Drake MD on 7/5/2022 at  9:38 AM   Comment    The gastroesophageal junction biopsy show squamous mucosa with basal cell hyperplasia and focally marked increase in intraepithelial eosinophils.  No columnar epithelium is identified.  The number of eosinophils present are more than typically seen in reflux esophagitis.  While severe reflux esophagitis is a possibility, evaluation of the proximal esophagus may be helpful to exclude eosinophilic esophagitis.  Correlation with clinical and endoscopic findings is suggested.     Showed findings consistent with reflux (heartburn) versus eosinophilic esophagitis.  These 2 conditions are treated with omeprazole 40 mg daily.  This should improve your symptoms.  However if your symptoms continue to persist, a 2nd endoscopy likely would be needed for a another biopsy.  I will write for the omeprazole 40 mg to be taken daily on an empty stomach 30 to 60 minutes before eating or drinking any foods.  Please follow-up with your primary care provider for a refill after the 3 months is out.  Recommend you see a gastroenterologist if your symptoms continues to persist.    If you have any questions or concerns please do not hesitate to call my office at [phone].  Sincerely,     [facility],   [facility]

## 2022-06-30 NOTE — H&P
[facility]    Pre-Endoscopy History and Physical     Favio Jacinto MRN# [id number]   YOB: 1965 Age: 56 year old     Date of Procedure: 6/30/2022  Primary care provider: Leah Stringer  Type of Endoscopy: Esophagogastroduodenoscopy with possible biopsy, possible dilation, possible foreign body removal  Reason for Procedure: reflux  Type of Anesthesia Anticipated: MAC    HPI:    Favio is a 56 year old male who will be undergoing the above procedure.  need EGD for reflux; no PPI; no blood thinner    A history and physical has been performed. The patient's medications and allergies have been reviewed. The risks and benefits of the procedure and the sedation options and risks were discussed with the patient.  All questions were answered and informed consent was obtained.      He denies a personal or family history of anesthesia complications or bleeding disorders.     Patient Active Problem List   Diagnosis     Bicuspid aortic valve     Aortic stenosis     GERD (gastroesophageal reflux disease)     Ex-smoker     CARDIOVASCULAR SCREENING; LDL GOAL LESS THAN 160     Gout, chronic     Polyp of colon, hyperplastic     Advanced directives, counseling/discussion     Atrial fibrillation (H)        Past Medical History:   Diagnosis Date     Sinusitis         Past Surgical History:   Procedure Laterality Date     COLONOSCOPY N/A 5/16/2022    Procedure: COLONOSCOPY, WITH POLYPECTOMY AND BIOPSY;  Surgeon: Parker Washington MD;  Location: [facility]     ESOPHAGOSCOPY, GASTROSCOPY, DUODENOSCOPY (EGD), COMBINED N/A 10/3/2019    Procedure: ESOPHAGOGASTRODUODENOSCOPY, WITH BIOPSY;  Surgeon: Favio Devries MD;  Location: [facility]     HERNIA REPAIR, INGUINAL RT/LT       ZZHC ARTHROTOMY W/OPEN MENISCUS REPAIR         Social History     Tobacco Use     Smoking status: Former Smoker     Types: Dip, chew, snus or snuff     Quit date: 1/1/2007     Years since quitting: 15.5     Smokeless tobacco: Current User  "    Types: Chew   Substance Use Topics     Alcohol use: Yes     Comment: occasional       Family History   Problem Relation Age of Onset     Cancer Father          at 60, smoker lung     Cancer Brother         testicular cancer, doing well. Skin cancer     Other Cancer Brother         Liver and lung     Respiratory Mother         asthma-uses Advair       Prior to Admission medications    Medication Sig Start Date End Date Taking? Authorizing Provider   allopurinol (ZYLOPRIM) 300 MG tablet Take 1 tablet (300 mg) by mouth daily To prevent gout. 3/20/22  Yes Leah Stringer MD   HYDROcodone-acetaminophen (NORCO) 5-325 MG tablet Take 1-2 tablets by mouth q 4 hours prn severe pain. 22  Yes Leah Stringer MD   predniSONE (DELTASONE) 20 MG tablet TAKE 2 TABLETS BY MOUTH DAILY FOR ACUTE GOUT PAIN 3/18/22  Yes Leah Stringer MD       Allergies   Allergen Reactions     Pcn [Penicillins]         REVIEW OF SYSTEMS:   5 point ROS negative except as noted above in HPI, including Gen., Resp., CV, GI &  system review.    PHYSICAL EXAM:   /74 (BP Location: Right arm)   Pulse 61   Temp 98.6  F (37  C) (Oral)   Ht 1.805 m (5' 11.06\")   Wt 83.9 kg (185 lb)   SpO2 99%   BMI 25.76 kg/m   Estimated body mass index is 25.76 kg/m  as calculated from the following:    Height as of this encounter: 1.805 m (5' 11.06\").    Weight as of this encounter: 83.9 kg (185 lb).   Constitutional: Awake, alert, no acute distress.  Eyes: No scleral icterus.  Conjunctiva are without injection.  ENMT: Mucous membranes moist, dentition and gums are intact.   Neck: Soft, supple, trachea midline.    Endocrine: n/a   Lymphatic: There is no cervical, submandibularadenopathy.  Respiratory: normal effortgs   Cardiovascular: S1, S2  Abdomen: Non-distended, non-tender,  No masses,  Musculoskeletal: No spinal or CVA tenderness. Full range of motion in the upper and lower extremities.    Skin: No skin rashes or lesions to inspection.  " No petechia.    Neurologic: alerted and oriented 3x  Psychiatric: The patient's affect is not blunted and mood is appropriate.  DIAGNOSTICS:    Not indicated    IMPRESSION   ASA Class 2 - Mild systemic disease    PLAN:   Plan for Esophagogastroduodenoscopy with possible biopsy, possible dilation, possible foreign body removal. We discussed the risks, benefits and alternatives and the patient wished to proceed.  Patient is cleared for the above procedure.    The above has been forwarded to the consulting provider.    [facility], [facility]

## 2022-07-05 ENCOUNTER — TELEPHONE (OUTPATIENT)
Dept: SURGERY | Facility: CLINIC | Age: 57
End: 2022-07-05

## 2022-07-05 DIAGNOSIS — K20.0 EOSINOPHILIC ESOPHAGITIS: Primary | ICD-10-CM

## 2022-07-05 LAB
PATH REPORT.COMMENTS IMP SPEC: NORMAL
PATH REPORT.FINAL DX SPEC: NORMAL
PATH REPORT.GROSS SPEC: NORMAL
PATH REPORT.MICROSCOPIC SPEC OTHER STN: NORMAL
PATH REPORT.RELEVANT HX SPEC: NORMAL
PHOTO IMAGE: NORMAL

## 2022-07-05 RX ORDER — OMEPRAZOLE 40 MG/1
40 CAPSULE, DELAYED RELEASE ORAL DAILY
Qty: 90 CAPSULE | Refills: 0 | Status: SHIPPED | OUTPATIENT
Start: 2022-07-05 | End: 2022-10-04

## 2022-07-05 NOTE — TELEPHONE ENCOUNTER
Message left for patient informing him of medication called in for him and to return call for results. See letters tab- Letter sent via [employer] and copy mailed also.     Yanira PARIKH RN  [facility]

## 2022-07-06 NOTE — TELEPHONE ENCOUNTER
Patient has read [employer] message regarding results and medicaition ordered.     Yanira PARIKH RN  [facility]

## 2022-08-17 DIAGNOSIS — M10.9 ACUTE GOUTY ARTHRITIS: ICD-10-CM

## 2022-08-19 RX ORDER — HYDROCODONE BITARTRATE AND ACETAMINOPHEN 5; 325 MG/1; MG/1
TABLET ORAL
Qty: 40 TABLET | Refills: 0 | Status: SHIPPED | OUTPATIENT
Start: 2022-08-19 | End: 2023-01-18

## 2022-10-03 DIAGNOSIS — K20.0 EOSINOPHILIC ESOPHAGITIS: ICD-10-CM

## 2022-10-04 RX ORDER — OMEPRAZOLE 40 MG/1
40 CAPSULE, DELAYED RELEASE ORAL DAILY
Qty: 90 CAPSULE | Refills: 0 | Status: SHIPPED | OUTPATIENT
Start: 2022-10-04 | End: 2023-08-09

## 2022-10-04 NOTE — TELEPHONE ENCOUNTER
Patient had EGD with bx on 6/30/22.   Rx directions state to take omeprazole for 90 days. Received refill request.  Should patient continue? Request from PCP?  Please advise.  Reba Gupta RN on 10/4/2022 at 9:39 AM

## 2022-11-19 ENCOUNTER — HEALTH MAINTENANCE LETTER (OUTPATIENT)
Age: 57
End: 2022-11-19

## 2023-01-17 DIAGNOSIS — M10.9 ACUTE GOUTY ARTHRITIS: ICD-10-CM

## 2023-01-18 RX ORDER — HYDROCODONE BITARTRATE AND ACETAMINOPHEN 5; 325 MG/1; MG/1
TABLET ORAL
Qty: 40 TABLET | Refills: 0 | Status: SHIPPED | OUTPATIENT
Start: 2023-01-18 | End: 2023-05-05

## 2023-01-19 ENCOUNTER — TELEPHONE (OUTPATIENT)
Dept: FAMILY MEDICINE | Facility: CLINIC | Age: 58
End: 2023-01-19
Payer: COMMERCIAL

## 2023-05-05 ENCOUNTER — ANCILLARY PROCEDURE (OUTPATIENT)
Dept: CT IMAGING | Facility: CLINIC | Age: 58
End: 2023-05-05
Attending: FAMILY MEDICINE
Payer: COMMERCIAL

## 2023-05-05 ENCOUNTER — OFFICE VISIT (OUTPATIENT)
Dept: FAMILY MEDICINE | Facility: CLINIC | Age: 58
End: 2023-05-05
Payer: COMMERCIAL

## 2023-05-05 VITALS
DIASTOLIC BLOOD PRESSURE: 66 MMHG | TEMPERATURE: 97.6 F | HEIGHT: 72 IN | BODY MASS INDEX: 25.27 KG/M2 | RESPIRATION RATE: 16 BRPM | HEART RATE: 64 BPM | OXYGEN SATURATION: 100 % | WEIGHT: 186.6 LBS | SYSTOLIC BLOOD PRESSURE: 98 MMHG

## 2023-05-05 DIAGNOSIS — I48.0 PAROXYSMAL ATRIAL FIBRILLATION (H): ICD-10-CM

## 2023-05-05 DIAGNOSIS — N13.30 HYDRONEPHROSIS OF LEFT KIDNEY: ICD-10-CM

## 2023-05-05 DIAGNOSIS — Z23 NEED FOR VACCINATION FOR DTAP: ICD-10-CM

## 2023-05-05 DIAGNOSIS — M79.672 BILATERAL FOOT PAIN: ICD-10-CM

## 2023-05-05 DIAGNOSIS — R10.32 ABDOMINAL PAIN, CHRONIC, LEFT LOWER QUADRANT: ICD-10-CM

## 2023-05-05 DIAGNOSIS — M79.671 BILATERAL FOOT PAIN: ICD-10-CM

## 2023-05-05 DIAGNOSIS — M10.9 ACUTE GOUTY ARTHRITIS: ICD-10-CM

## 2023-05-05 DIAGNOSIS — Z00.00 ROUTINE GENERAL MEDICAL EXAMINATION AT A HEALTH CARE FACILITY: Primary | ICD-10-CM

## 2023-05-05 DIAGNOSIS — Q23.81 BICUSPID AORTIC VALVE: ICD-10-CM

## 2023-05-05 DIAGNOSIS — M1A.9XX0 CHRONIC GOUT WITHOUT TOPHUS, UNSPECIFIED CAUSE, UNSPECIFIED SITE: ICD-10-CM

## 2023-05-05 DIAGNOSIS — R39.9 URINARY SYMPTOM OR SIGN: ICD-10-CM

## 2023-05-05 DIAGNOSIS — Z12.5 SCREENING FOR PROSTATE CANCER: ICD-10-CM

## 2023-05-05 DIAGNOSIS — R73.09 ABNORMAL GLUCOSE: ICD-10-CM

## 2023-05-05 DIAGNOSIS — G89.29 ABDOMINAL PAIN, CHRONIC, LEFT LOWER QUADRANT: ICD-10-CM

## 2023-05-05 DIAGNOSIS — Z13.6 CARDIOVASCULAR SCREENING; LDL GOAL LESS THAN 160: ICD-10-CM

## 2023-05-05 DIAGNOSIS — K21.9 GASTROESOPHAGEAL REFLUX DISEASE, UNSPECIFIED WHETHER ESOPHAGITIS PRESENT: ICD-10-CM

## 2023-05-05 DIAGNOSIS — N20.0 KIDNEY STONE: ICD-10-CM

## 2023-05-05 LAB
ALBUMIN UR-MCNC: NEGATIVE MG/DL
APPEARANCE UR: CLEAR
BILIRUB UR QL STRIP: NEGATIVE
CHOLEST SERPL-MCNC: 186 MG/DL
COLOR UR AUTO: YELLOW
CRP SERPL-MCNC: <2.9 MG/L (ref 0–8)
FASTING STATUS PATIENT QL REPORTED: YES
GLUCOSE UR STRIP-MCNC: NEGATIVE MG/DL
HBA1C MFR BLD: 5.2 % (ref 0–5.6)
HDLC SERPL-MCNC: 54 MG/DL
HGB UR QL STRIP: ABNORMAL
KETONES UR STRIP-MCNC: NEGATIVE MG/DL
LDLC SERPL CALC-MCNC: 104 MG/DL
LEUKOCYTE ESTERASE UR QL STRIP: NEGATIVE
NITRATE UR QL: NEGATIVE
NONHDLC SERPL-MCNC: 132 MG/DL
PH UR STRIP: 5 [PH] (ref 5–7)
RBC #/AREA URNS AUTO: ABNORMAL /HPF
SP GR UR STRIP: 1.02 (ref 1–1.03)
TRIGL SERPL-MCNC: 140 MG/DL
TSH SERPL DL<=0.005 MIU/L-ACNC: 3.38 MU/L (ref 0.4–4)
URATE SERPL-MCNC: 6.5 MG/DL (ref 3.5–7.2)
UROBILINOGEN UR STRIP-ACNC: 0.2 E.U./DL
WBC #/AREA URNS AUTO: ABNORMAL /HPF

## 2023-05-05 PROCEDURE — 99396 PREV VISIT EST AGE 40-64: CPT | Mod: 25 | Performed by: FAMILY MEDICINE

## 2023-05-05 PROCEDURE — 81001 URINALYSIS AUTO W/SCOPE: CPT | Performed by: FAMILY MEDICINE

## 2023-05-05 PROCEDURE — 83036 HEMOGLOBIN GLYCOSYLATED A1C: CPT | Performed by: FAMILY MEDICINE

## 2023-05-05 PROCEDURE — 99214 OFFICE O/P EST MOD 30 MIN: CPT | Mod: 25 | Performed by: FAMILY MEDICINE

## 2023-05-05 PROCEDURE — 86140 C-REACTIVE PROTEIN: CPT | Performed by: FAMILY MEDICINE

## 2023-05-05 PROCEDURE — 36415 COLL VENOUS BLD VENIPUNCTURE: CPT | Performed by: FAMILY MEDICINE

## 2023-05-05 PROCEDURE — 74177 CT ABD & PELVIS W/CONTRAST: CPT | Mod: TC | Performed by: RADIOLOGY

## 2023-05-05 PROCEDURE — 84550 ASSAY OF BLOOD/URIC ACID: CPT | Performed by: FAMILY MEDICINE

## 2023-05-05 PROCEDURE — 90715 TDAP VACCINE 7 YRS/> IM: CPT | Performed by: FAMILY MEDICINE

## 2023-05-05 PROCEDURE — 84443 ASSAY THYROID STIM HORMONE: CPT | Performed by: FAMILY MEDICINE

## 2023-05-05 PROCEDURE — 80061 LIPID PANEL: CPT | Performed by: FAMILY MEDICINE

## 2023-05-05 PROCEDURE — 90471 IMMUNIZATION ADMIN: CPT | Performed by: FAMILY MEDICINE

## 2023-05-05 RX ORDER — IOPAMIDOL 755 MG/ML
200 INJECTION, SOLUTION INTRAVASCULAR ONCE
Status: COMPLETED | OUTPATIENT
Start: 2023-05-05 | End: 2023-05-05

## 2023-05-05 RX ORDER — HYDROCODONE BITARTRATE AND ACETAMINOPHEN 5; 325 MG/1; MG/1
TABLET ORAL
Qty: 60 TABLET | Refills: 0 | Status: SHIPPED | OUTPATIENT
Start: 2023-05-05 | End: 2023-09-14

## 2023-05-05 RX ADMIN — Medication 72 ML: at 12:55

## 2023-05-05 RX ADMIN — IOPAMIDOL 103 ML: 755 INJECTION, SOLUTION INTRAVASCULAR at 12:55

## 2023-05-05 ASSESSMENT — PAIN SCALES - GENERAL: PAINLEVEL: MODERATE PAIN (4)

## 2023-05-05 NOTE — PROGRESS NOTES
Prior to immunization administration, verified patients identity using patient s name and date of birth. Please see Immunization Activity for additional information.     Screening Questionnaire for Adult Immunization    Are you sick today?   No   Do you have allergies to medications, food, a vaccine component or latex?   No   Have you ever had a serious reaction after receiving a vaccination?   No   Do you have a long-term health problem with heart, lung, kidney, or metabolic disease (e.g., diabetes), asthma, a blood disorder, no spleen, complement component deficiency, a cochlear implant, or a spinal fluid leak?  Are you on long-term aspirin therapy?   No   Do you have cancer, leukemia, HIV/AIDS, or any other immune system problem?   No   Do you have a parent, brother, or sister with an immune system problem?   No   In the past 3 months, have you taken medications that affect  your immune system, such as prednisone, other steroids, or anticancer drugs; drugs for the treatment of rheumatoid arthritis, Crohn s disease, or psoriasis; or have you had radiation treatments?   No   Have you had a seizure, or a brain or other nervous system problem?   No   During the past year, have you received a transfusion of blood or blood    products, or been given immune (gamma) globulin or antiviral drug?   No   For women: Are you pregnant or is there a chance you could become       pregnant during the next month?   No   Have you received any vaccinations in the past 4 weeks?   No     Immunization questionnaire answers were all negative.      Injection of Tdap, given by Vazquez Romero CMA. Patient instructed to remain in clinic for 15 minutes afterwards, and to report any adverse reactions.     Screening performed by Vazquez Romero CMA on 5/5/2023 at 6:58 AM.          SUBJECTIVE:     CC: Mason is an 57 year old who presents for preventative health visit.       5/5/2023     6:49 AM   Additional Questions   Roomed by Vazquez SHAFER    Accompanied by NA         2023     6:49 AM   Patient Reported Additional Medications   Patient reports taking the following new medications NA     Patient has been advised of split billing requirements and indicates understanding: Yes  HPI      Gastrointestinal symptoms    Duration: 3 months  Description:           ABDOMINAL PAIN - LUQ  .   Pain is described as aching, stabbing and gnawing and radiates to NA, pretty local per patient.  Intensity:  4/10  Accompanying signs and symptoms:  none  History  Previous {similar problem: YES- about 10 years ago  Previous evaluation:  Ultrasound, normal  Aggravating factors: exercising  Alleviating factors: bowel movement  Other Therapies tried: None        Today's PHQ-2 Score:       2023     6:50 AM   PHQ-2 (  [employer])   Q1: Little interest or pleasure in doing things 0   Q2: Feeling down, depressed or hopeless 0   PHQ-2 Score 0           Social History     Tobacco Use     Smoking status: Former     Types: Dip, chew, snus or snuff     Quit date: 2007     Years since quittin.3     Passive exposure: Never     Smokeless tobacco: Current     Types: Chew   Vaping Use     Vaping status: Never Used   Substance Use Topics     Alcohol use: Yes     Comment: occasional             3/18/2022     9:07 AM   Alcohol Use   Prescreen: >3 drinks/day or >7 drinks/week? Yes   AUDIT SCORE  17       Last PSA:   PSA   Date Value Ref Range Status   2017 0.77 0 - 4 ug/L Final     Comment:     Assay Method:  Chemiluminescence using Siemens Vista analyzer     Prostate Specific Antigen Screen   Date Value Ref Range Status   2022 0.90 0.00 - 4.00 ug/L Final       Reviewed orders with patient. Reviewed health maintenance and updated orders accordingly - Yes  Labs reviewed in EPIC    Reviewed and updated as needed this visit by clinical staff   Tobacco  Allergies  Meds              Reviewed and updated as needed this visit by Provider                     Review of  "Systems  CONSTITUTIONAL: NEGATIVE for fever, chills, change in weight  INTEGUMENTARY/SKIN: NEGATIVE for worrisome rashes, moles or lesions  EYES: NEGATIVE for vision changes or irritation  ENT: NEGATIVE for ear, mouth and throat problems  RESP: NEGATIVE for significant cough or SOB  CV: NEGATIVE for chest pain, palpitations or peripheral edema  GI: Positive for left lower quadrant abdominal pain, intermittent and crampy.   male: Notes urinary frequency.  MUSCULOSKELETAL: Chronic, bilateral foot pain.  NEURO: NEGATIVE for weakness, dizziness or paresthesias  PSYCHIATRIC: NEGATIVE for changes in mood or affect    OBJECTIVE:   BP 98/66   Pulse 64   Temp 97.6  F (36.4  C) (Temporal)   Resp 16   Ht 1.84 m (6' 0.44\")   Wt 84.6 kg (186 lb 9.6 oz)   SpO2 100%   BMI 25.00 kg/m      Physical Exam  GENERAL: Healthy, alert and no distress  EYES: Eyes grossly normal to inspection, conjunctivae and sclerae normal  RESP: Lungs clear to auscultation - no rales, rhonchi or wheezes  CV: Regular rate and rhythm, normal S1 S2, no murmur  GI:  soft, nontender, no HSM   MS: No gross musculoskeletal defects noted, no edema  NEURO: Normal strength and tone, mentation intact and speech normal  PSYCH: Mentation appears normal, affect normal/bright     Diagnostic Test Results:  Labs reviewed in Epic  Results for orders placed or performed in visit on 05/05/23   CT Abdomen Pelvis w Contrast     Status: None    Narrative    CT ABDOMEN PELVIS W CONTRAST 5/5/2023 12:57 PM    CLINICAL HISTORY: Abdominal pain, chronic, left lower quadrant;  Abdominal pain, chronic, left lower quadrant    TECHNIQUE: CT scan of the abdomen and pelvis was performed following  injection of IV contrast. Multiplanar reformats were obtained. Dose  reduction techniques were used.  CONTRAST: 103mL Isovue-370    COMPARISON: 5/1/17.    FINDINGS:   LOWER CHEST: Normal.    HEPATOBILIARY: Tiny hypodense lesions in the liver, too small to  adequately characterize, but " likely cysts and hemangiomas. No  calcified gallstones or biliary ductal dilatation.    PANCREAS: Normal.    SPLEEN: Normal.    ADRENAL GLANDS: Normal.    KIDNEYS/BLADDER: Obstructing calculus in the left distal ureter  measuring 6 x 4 x 7 mm with mild left hydroureteronephrosis and  periureteral stranding. The calculus is about 1.5 cm above the left  ureterovesicular junction. No other calculi in the left kidney. No  left renal masses.    A 2 mm nonobstructing right renal calculus. No right renal masses or  perinephric stranding. Mild right hydroureteronephrosis to the level  of the urinary bladder without an obstructing stone or mass.    No urinary bladder mass or calculus.    BOWEL: Normal with no obstruction or acute inflammatory change.  Nothing for appendicitis.    PELVIC ORGANS: No pelvic masses. Intraprostatic calcifications.    ADDITIONAL FINDINGS: A 1.3 cm left para-aortic lymph node (series 5,  image 84), nonspecific and likely reactive. Otherwise, no enlarged  lymph nodes.    Borderline ectatic celiac artery measuring 1.5 cm. No narrowing at its  origin. No abdominal aortic aneurysm. No free fluid or fluid  collections.    MUSCULOSKELETAL: No suspicious lesions in the bones.      Impression    IMPRESSION:   1.  A 7 mm obstructing calculus in the left distal ureter with mild  left hydroureteronephrosis and mild periureteral stranding. Consider  urology consultation.  2.  A 2 mm nonobstructing right intrarenal calculus. Mild right  pelviectasis and ureterectasis to the level of the urinary bladder  without an obstructing mass or calculus.    BETZY FORRESTER MD         SYSTEM ID:  [id number]   Results for orders placed or performed in visit on 05/05/23   CRP, inflammation     Status: Normal   Result Value Ref Range    CRP Inflammation <2.9 0.0 - 8.0 mg/L   Hemoglobin A1c     Status: Normal   Result Value Ref Range    Hemoglobin A1C 5.2 0.0 - 5.6 %   Uric acid     Status: Normal   Result Value Ref Range    Uric  Acid 6.5 3.5 - 7.2 mg/dL   TSH with free T4 reflex     Status: Normal   Result Value Ref Range    TSH 3.38 0.40 - 4.00 mU/L   Lipid panel reflex to direct LDL Fasting     Status: Abnormal   Result Value Ref Range    Cholesterol 186 <200 mg/dL    Triglycerides 140 <150 mg/dL    Direct Measure HDL 54 >=40 mg/dL    LDL Cholesterol Calculated 104 (H) <=100 mg/dL    Non HDL Cholesterol 132 (H) <130 mg/dL    Patient Fasting > 8hrs? Yes     Narrative    Cholesterol  Desirable:  <200 mg/dL    Triglycerides  Normal:  Less than 150 mg/dL  Borderline High:  150-199 mg/dL  High:  200-499 mg/dL  Very High:  Greater than or equal to 500 mg/dL    Direct Measure HDL  Female:  Greater than or equal to 50 mg/dL   Male:  Greater than or equal to 40 mg/dL    LDL Cholesterol  Desirable:  <100mg/dL  Above Desirable:  100-129 mg/dL   Borderline High:  130-159 mg/dL   High:  160-189 mg/dL   Very High:  >= 190 mg/dL    Non HDL Cholesterol  Desirable:  130 mg/dL  Above Desirable:  130-159 mg/dL  Borderline High:  160-189 mg/dL  High:  190-219 mg/dL  Very High:  Greater than or equal to 220 mg/dL   UA Macroscopic with reflex to Microscopic and Culture     Status: Abnormal    Specimen: Urine, Midstream   Result Value Ref Range    Color Urine Yellow Colorless, Straw, Light Yellow, Yellow    Appearance Urine Clear Clear    Glucose Urine Negative Negative mg/dL    Bilirubin Urine Negative Negative    Ketones Urine Negative Negative mg/dL    Specific Gravity Urine 1.025 1.003 - 1.035    Blood Urine Large (A) Negative    pH Urine 5.0 5.0 - 7.0    Protein Albumin Urine Negative Negative mg/dL    Urobilinogen Urine 0.2 0.2, 1.0 E.U./dL    Nitrite Urine Negative Negative    Leukocyte Esterase Urine Negative Negative   UA Microscopic with Reflex to Culture     Status: Abnormal   Result Value Ref Range    RBC Urine 25-50 (A) 0-2 /HPF /HPF    WBC Urine 0-5 0-5 /HPF /HPF    Narrative    Urine Culture not indicated       ASSESSMENT/PLAN:     (Z00.00)  Routine general medical examination at a health care facility  (primary encounter diagnosis)  Comment:  Reviewed the need for periodic healthcare exams and screenings.   Plan: REVIEW OF HEALTH MAINTENANCE PROTOCOL ORDERS        Advise yearly physicals.    (M10.9) Acute gouty arthritis  Comment: No recent flares, uric acid level within guidelines.  Plan: HYDROcodone-acetaminophen (NORCO) 5-325 MG         tablet, Uric acid        Continue on allopurinol.    (I48.0) Paroxysmal atrial fibrillation (H)  Comment: No reoccurrence, patient today appears to be in sinus rhythm, normal thyroid function.  Plan: TSH with free T4 reflex        Monitor.    (K21.9) Gastroesophageal reflux disease, unspecified whether esophagitis present  Comment: Symptom-free as long as he takes PPI therapy.  Plan: Continue.    (M1A.9XX0) Chronic gout without tophus, unspecified cause, unspecified site  Comment: No flare since taking allopurinol      (Q23.1) Bicuspid aortic valve  Comment: No evidence of aortic stenosis or insufficiency, no evidence of congestive heart failure.  Echocardiogram done last year.  Plan: Repeat echocardiogram every 2 years.    (R39.9) Urinary symptom or sign  Comment: RBC present, initially etiology unclear, subsequent CT scan showed kidney stone.  Plan: UA Macroscopic with reflex to Microscopic and         Culture, UA Microscopic with Reflex to Culture           (R10.32,  G89.29) Abdominal pain, chronic, left lower quadrant  Comment: Patient presented with vague lower abdominal pain, CT revealed 7 mm kidney stone in the left ureter which would explain his symptoms.  No other pathology seen.  Plan: CT Abdomen Pelvis w Contrast, CRP, inflammation       We will start Flomax, referral to urology.    (Z12.5) Screening for prostate cancer  Comment: Normal.  Plan: Repeat yearly until age 70.    (R73.09) Abnormal glucose  Comment: No signs of insulin resistance.  Lab Results   Component Value Date    A1C 5.2 05/05/2023    A1C  "5.3 03/18/2022    A1C 5.2 04/26/2017    A1C 5.3 08/12/2014        Plan: Hemoglobin A1c        Monitor.    (Z13.6) CARDIOVASCULAR SCREENING; LDL GOAL LESS THAN 160  Comment: Acceptable lipid profile without statin therapy.  Plan: Lipid panel reflex to direct LDL Fasting        Monitor.    (M79.671,  M79.672) Bilateral foot pain  Comment: Ongoing, seen by podiatry, minimal therapeutic options available.  Does note significant pain that resolves with taking 1/2 tablet of Vicodin daily.  Plan: We will continue.  Reviewed the patient may need to follow protocol for chronic opioid therapy.    (Z23) Need for vaccination for DTaP  Plan: TDAP VACCINE (Adacel, Boostrix)    Patient Instructions     Preventive Health Recommendations  Male Ages 50 - 64    Tetanus booster.     Blood and urine tests today.     Time for a CT scan. To set this up: call [phone].    No change in medications.     The abdomin pain could be a condition called IBS: irritable bowel syndrome.     Echocardiogram next year.     For the constipation, try 1/2 capful of the MiraLax powder daily. Adjust the dose to a regular BM.       Patient has been advised of split billing requirements and indicates understanding: Yes      COUNSELING:   Reviewed preventive health counseling, as reflected in patient instructions       Regular exercise       Healthy diet/nutrition       Vision screening       Hearing screening       Colorectal cancer screening       Prostate cancer screening      BMI:   Estimated body mass index is 25 kg/m  as calculated from the following:    Height as of this encounter: 1.84 m (6' 0.44\").    Weight as of this encounter: 84.6 kg (186 lb 9.6 oz).   Weight management plan: Discussed healthy diet and exercise guidelines      He reports that he quit smoking about 16 years ago. His smoking use included dip, chew, snus or snuff. He has never been exposed to tobacco smoke. His smokeless tobacco use includes chew.        Leah Stringer MD  [facility] " [facility]

## 2023-05-05 NOTE — PATIENT INSTRUCTIONS
Preventive Health Recommendations  Male Ages 50 - 64    Tetanus booster.     Blood and urine tests today.     Time for a CT scan. To set this up: call [phone].    No change in medications.     The abdomin pain could be a condition called IBS: irritable bowel syndrome.     Echocardiogram next year.     For the constipation, try 1/2 capful of the MiraLax powder daily. Adjust the dose to a regular BM.         Yearly exam:             See your health care provider every year in order to  o   Review health changes.   o   Discuss preventive care.    o   Review your medicines if your doctor has prescribed any.   Have a cholesterol test every 5 years, or more frequently if you are at risk for high cholesterol/heart disease.   Have a diabetes test (fasting glucose) every three years. If you are at risk for diabetes, you should have this test more often.   Have a colonoscopy at age 50, or have a yearly FIT test (stool test). These exams will check for colon cancer.    Talk with your health care provider about whether or not a prostate cancer screening test (PSA) is right for you.  You should be tested each year for STDs (sexually transmitted diseases), if you re at risk.     Shots: Get a flu shot each year. Get a tetanus shot every 10 years.     Nutrition:  Eat at least 5 servings of fruits and vegetables daily.   Eat whole-grain bread, whole-wheat pasta and brown rice instead of white grains and rice.   Get adequate Calcium and Vitamin D.     Lifestyle  Exercise for at least 150 minutes a week (30 minutes a day, 5 days a week). This will help you control your weight and prevent disease.   Limit alcohol to one drink per day.   No smoking.   Wear sunscreen to prevent skin cancer.   See your dentist every six months for an exam and cleaning.   See your eye doctor every 1 to 2 years.

## 2023-05-06 RX ORDER — TAMSULOSIN HYDROCHLORIDE 0.4 MG/1
0.4 CAPSULE ORAL DAILY
Qty: 14 CAPSULE | Refills: 0 | Status: SHIPPED | OUTPATIENT
Start: 2023-05-06 | End: 2023-05-20

## 2023-05-22 ENCOUNTER — VIRTUAL VISIT (OUTPATIENT)
Dept: UROLOGY | Facility: CLINIC | Age: 58
End: 2023-05-22
Payer: COMMERCIAL

## 2023-05-22 DIAGNOSIS — N13.30 HYDRONEPHROSIS OF LEFT KIDNEY: ICD-10-CM

## 2023-05-22 DIAGNOSIS — N20.0 KIDNEY STONE: ICD-10-CM

## 2023-05-22 PROCEDURE — 99204 OFFICE O/P NEW MOD 45 MIN: CPT | Mod: VID | Performed by: STUDENT IN AN ORGANIZED HEALTH CARE EDUCATION/TRAINING PROGRAM

## 2023-05-22 RX ORDER — TAMSULOSIN HYDROCHLORIDE 0.4 MG/1
0.4 CAPSULE ORAL DAILY
Qty: 14 CAPSULE | Refills: 0 | Status: SHIPPED | OUTPATIENT
Start: 2023-05-22 | End: 2023-06-05

## 2023-05-22 NOTE — PROGRESS NOTES
Mason is a 57 year old who is being evaluated via a billable video visit.      How would you like to obtain your AVS? MyChart  If the video visit is dropped, the invitation should be resent by: Text to cell phone: [phone]  Will anyone else be joining your video visit? No                  Video-Visit Details    Type of service:  Video Visit  Video stop time: 8:04 AM  Video end time: 8:20 AM    Originating Location (pt. Location): Home    Distant Location (provider location):  Off-site  Platform used for Video Visit: [employer]             Chief Complaint:    Kidney/Ureteral Stone           Consult or Referral:     Mr. Favio Jacinto is a 57 year old male seen at the request of Dr. Stringer.         History of Present Illness:    Favio Jacinto is a very pleasant 57 year old male who presents with a history of Kidney/Ureteral Stone.      Reviewed previous notes from Dr. Stringer  Complains of left lower abdominal pain for the last 4 months  He was recently reviewed by his primary care doctor and found to have a distal left ureteral stone and a small nonobstructing stone in the right kidney  Currently does not complain of any pain  Has not passed the stone yet but is not straining the urine  No prior stone episodes             Past Medical History:     Past Medical History:   Diagnosis Date     Sinusitis             Past Surgical History:     Past Surgical History:   Procedure Laterality Date     COLONOSCOPY N/A 5/16/2022    Procedure: COLONOSCOPY, WITH POLYPECTOMY AND BIOPSY;  Surgeon: Parker Washington MD;  Location: [facility]     ESOPHAGOSCOPY, GASTROSCOPY, DUODENOSCOPY (EGD), COMBINED N/A 10/3/2019    Procedure: ESOPHAGOGASTRODUODENOSCOPY, WITH BIOPSY;  Surgeon: Favio Devries MD;  Location: [facility]     ESOPHAGOSCOPY, GASTROSCOPY, DUODENOSCOPY (EGD), COMBINED N/A 6/30/2022    Procedure: ESOPHAGOGASTRODUODENOSCOPY, WITH BIOPSY;  Surgeon: Parker Washington MD;  Location: [facility]     HERNIA REPAIR, INGUINAL RT/LT       [facility]  ARTHROTOMY W/OPEN MENISCUS REPAIR              Medications     Current Outpatient Medications   Medication     tamsulosin (FLOMAX) 0.4 MG capsule     allopurinol (ZYLOPRIM) 300 MG tablet     HYDROcodone-acetaminophen (NORCO) 5-325 MG tablet     omeprazole (PRILOSEC) 40 MG DR capsule     predniSONE (DELTASONE) 20 MG tablet     No current facility-administered medications for this visit.     Facility-Administered Medications Ordered in Other Visits   Medication     iopamidol (ISOVUE-370) 76% solution 5-200 mL     lidocaine (cardiac) (XYLOCAINE) injection 100 mg     metoprolol (LOPRESSOR) injection 5-15 mg     metoprolol (LOPRESSOR) tablet  mg     nitroglycerin (NITROSTAT) SL tablet 0.4 mg     sodium chloride (PF) 0.9% PF flush 5-10 mL     sodium chloride 0.9 % BOLUS 100 mL     sodium chloride bacteriostatic 0.9 % flush 0-1 mL            Family History:     Family History   Problem Relation Age of Onset     Cancer Father          at 60, smoker lung     Cancer Brother         testicular cancer, doing well. Skin cancer     Other Cancer Brother         Liver and lung     Respiratory Mother         asthma-uses Advair            Social History:     Social History     Socioeconomic History     Marital status:      Spouse name: Not on file     Number of children: Not on file     Years of education: Not on file     Highest education level: Not on file   Occupational History     Not on file   Tobacco Use     Smoking status: Former     Types: Dip, chew, snus or snuff     Quit date: 2007     Years since quittin.3     Passive exposure: Never     Smokeless tobacco: Current     Types: Chew   Vaping Use     Vaping status: Never Used   Substance and Sexual Activity     Alcohol use: Yes     Comment: occasional     Drug use: No     Sexual activity: Yes     Partners: Female   Other Topics Concern     Parent/sibling w/ CABG, MI or angioplasty before 65F 55M? Not Asked   Social History Narrative     Not on file      Social Determinants of Health     Financial Resource Strain: Not on file   Food Insecurity: Not on file   Transportation Needs: Not on file   Physical Activity: Not on file   Stress: Not on file   Social Connections: Not on file   Intimate Partner Violence: Not on file   Housing Stability: Not on file            Allergies:   Pcn [penicillins]         Review of Systems:  From intake questionnaire     Skin: negative  Eyes: negative  Ears/Nose/Throat: negative  Respiratory: No shortness of breath, dyspnea on exertion, cough, or hemoptysis  Cardiovascular: No chest pain or palpitations  Gastrointestinal: negative; no nausea/vomiting, constipation or diarrhea  Genitourinary: as per HPI  Musculoskeletal: negative  Neurologic: negative  Psychiatric: negative  Hematologic/Lymphatic/Immunologic: negative  Endocrine: negative         Physical Exam:   This is a virtual visit    Alert, no acute distress, oriented, conversant    Ears/nose/mouth: mouth:normal, good dentition  Respiratory: no respiratory distress, or pursed lip breathing  Cardiovascular:no obvious jugular venous distension present  Skin: no suspicious lesions or rashes on Visible body parts on the Screen  Neuro: Alert, oriented, speech and mentation normal  Psych: affect and mood normal, alert and oriented to person, place and time      Labs and Pathology:    The following labs were reviewed by me and discussed with the patient:  UA: Abnormal: 25-50 RBCs per urine mL  Significant for   Lab Results   Component Value Date    CR 1.00 03/18/2022    CR 0.99 08/28/2020    CR 0.99 09/25/2019    CR 0.96 05/08/2018    CR 1.14 04/26/2017    CR 0.98 08/11/2015    CR 1.1 08/12/2014    CR 1.07 02/15/2013    CR 1.14 11/29/2011    CR 1.12 08/04/2011    CR 0.96 04/12/2010     PSA   Date Value Ref Range Status   04/26/2017 0.77 0 - 4 ug/L Final     Comment:     Assay Method:  Chemiluminescence using Siemens Vista analyzer     Prostate Specific Antigen Screen   Date Value Ref  Range Status   03/18/2022 0.90 0.00 - 4.00 ug/L Final             Imaging:    The following imaging exams were independently viewed and interpreted by me and discussed with patient:  CT Scan Abd/Pelvis: Abnormal: Left distal ureteral stone about 1 cm from the UVJ,  Right 2 mm renal stone             Assessment and Plan:     Assessment:     Kidney stone  Right nonobstructing renal stone  Left distal ureteral stone  No documented passage since last imaging currently taking tamsulosin, minimal discomfort but no significant pain  We will have him scheduled for a pelvis CT without contrast to document passage  If stone still present we will have scheduled for ureteroscopy  I have sent him more prescriptions for tamsulosin  Discussed about the details of the ureteroscopy and stent necessary afterwards  Issues with ureteroscopy were also discussed.    - Adult Urology  Referral  - CT Pelvis Soft Tissue wo Contrast; Future  - tamsulosin (FLOMAX) 0.4 MG capsule; Take 1 capsule (0.4 mg) by mouth daily for 14 days    Hydronephrosis of left kidney  Mild left hydronephrosis  Due to ureteral obstruction  - Adult Urology  Referral  - CT Pelvis Soft Tissue wo Contrast; Future  - tamsulosin (FLOMAX) 0.4 MG capsule; Take 1 capsule (0.4 mg) by mouth daily for 14 days    Plan:  CT pelvis to document stone persistence,  If stone present we will proceed ahead with ureteroscopy    Orders  Orders Placed This Encounter   Procedures     CT Pelvis Soft Tissue wo Contrast         Jony Lara MD  [facility]                  ==========================    Additional Billing and Coding Information:  Review of external notes as documented above   Review of the result(s) of each unique test - UA, creatinine    Independent interpretation of a test performed by another physician/other qualified health care professional (not separately reported) -   CT abdomen pelvis discussed with the  patient    Discussion of management or test interpretation with external physician/other qualified healthcare professional/appropriate source -           20 minutes spent by me on the date of the encounter doing chart review, review of test results, interpretation of tests, patient visit and documentation     ==========================

## 2023-05-22 NOTE — LETTER
5/22/2023       RE: Favio Jacinto  [address] [address]     Dear Colleague,    Thank you for referring your patient, Favio Jacinto, to the [facility] at [facility]. Please see a copy of my visit note below.    Mason is a 57 year old who is being evaluated via a billable video visit.      How would you like to obtain your AVS? MyChart  If the video visit is dropped, the invitation should be resent by: Text to cell phone: [phone]  Will anyone else be joining your video visit? No                 Video-Visit Details    Type of service:  Video Visit  Video stop time: 8:04 AM  Video end time: 8:20 AM    Originating Location (pt. Location): Home    Distant Location (provider location):  Off-site  [facility]             Chief Complaint:    Kidney/Ureteral Stone           Consult or Referral:     Mr. Favio Jacinto is a 57 year old male seen at the request of Dr. Stringer.         History of Present Illness:    Favio Jacinto is a very pleasant 57 year old male who presents with a history of Kidney/Ureteral Stone.      Reviewed previous notes from Dr. Stringer  Complains of left lower abdominal pain for the last 4 months  He was recently reviewed by his primary care doctor and found to have a distal left ureteral stone and a small nonobstructing stone in the right kidney  Currently does not complain of any pain  Has not passed the stone yet but is not straining the urine  No prior stone episodes             Past Medical History:     Past Medical History:   Diagnosis Date    Sinusitis             Past Surgical History:     Past Surgical History:   Procedure Laterality Date    COLONOSCOPY N/A 5/16/2022    Procedure: COLONOSCOPY, WITH POLYPECTOMY AND BIOPSY;  Surgeon: Parker Washington MD;  Location: [facility]    ESOPHAGOSCOPY, GASTROSCOPY, DUODENOSCOPY (EGD), COMBINED N/A 10/3/2019    Procedure:  ESOPHAGOGASTRODUODENOSCOPY, WITH BIOPSY;  Surgeon: Favio Devries MD;  Location: [facility]    ESOPHAGOSCOPY, GASTROSCOPY, DUODENOSCOPY (EGD), COMBINED N/A 2022    Procedure: ESOPHAGOGASTRODUODENOSCOPY, WITH BIOPSY;  Surgeon: Parker Washington MD;  Location: [facility]    HERNIA REPAIR, INGUINAL RT/LT      [facility] ARTHROTOMY W/OPEN MENISCUS REPAIR              Medications     Current Outpatient Medications   Medication    tamsulosin (FLOMAX) 0.4 MG capsule    allopurinol (ZYLOPRIM) 300 MG tablet    HYDROcodone-acetaminophen (NORCO) 5-325 MG tablet    omeprazole (PRILOSEC) 40 MG DR capsule    predniSONE (DELTASONE) 20 MG tablet     No current facility-administered medications for this visit.     Facility-Administered Medications Ordered in Other Visits   Medication    iopamidol (ISOVUE-370) 76% solution 5-200 mL    lidocaine (cardiac) (XYLOCAINE) injection 100 mg    metoprolol (LOPRESSOR) injection 5-15 mg    metoprolol (LOPRESSOR) tablet  mg    nitroglycerin (NITROSTAT) SL tablet 0.4 mg    sodium chloride (PF) 0.9% PF flush 5-10 mL    sodium chloride 0.9 % BOLUS 100 mL    sodium chloride bacteriostatic 0.9 % flush 0-1 mL            Family History:     Family History   Problem Relation Age of Onset    Cancer Father          at 60, smoker lung    Cancer Brother         testicular cancer, doing well. Skin cancer    Other Cancer Brother         Liver and lung    Respiratory Mother         asthma-uses Advair            Social History:     Social History     Socioeconomic History    Marital status:      Spouse name: Not on file    Number of children: Not on file    Years of education: Not on file    Highest education level: Not on file   Occupational History    Not on file   Tobacco Use    Smoking status: Former     Types: Dip, chew, snus or snuff     Quit date: 2007     Years since quittin.3     Passive exposure: Never    Smokeless tobacco: Current     Types: Chew   Vaping Use    Vaping status: Never Used    Substance and Sexual Activity    Alcohol use: Yes     Comment: occasional    Drug use: No    Sexual activity: Yes     Partners: Female   Other Topics Concern    Parent/sibling w/ CABG, MI or angioplasty before 65F 55M? Not Asked   Social History Narrative    Not on file     Social Determinants of Health     Financial Resource Strain: Not on file   Food Insecurity: Not on file   Transportation Needs: Not on file   Physical Activity: Not on file   Stress: Not on file   Social Connections: Not on file   Intimate Partner Violence: Not on file   Housing Stability: Not on file            Allergies:   Pcn [penicillins]         Review of Systems:  From intake questionnaire     Skin: negative  Eyes: negative  Ears/Nose/Throat: negative  Respiratory: No shortness of breath, dyspnea on exertion, cough, or hemoptysis  Cardiovascular: No chest pain or palpitations  Gastrointestinal: negative; no nausea/vomiting, constipation or diarrhea  Genitourinary: as per HPI  Musculoskeletal: negative  Neurologic: negative  Psychiatric: negative  Hematologic/Lymphatic/Immunologic: negative  Endocrine: negative         Physical Exam:   This is a virtual visit    Alert, no acute distress, oriented, conversant    Ears/nose/mouth: mouth:normal, good dentition  Respiratory: no respiratory distress, or pursed lip breathing  Cardiovascular:no obvious jugular venous distension present  Skin: no suspicious lesions or rashes on Visible body parts on the Screen  Neuro: Alert, oriented, speech and mentation normal  Psych: affect and mood normal, alert and oriented to person, place and time      Labs and Pathology:    The following labs were reviewed by me and discussed with the patient:  UA: Abnormal: 25-50 RBCs per urine mL  Significant for   Lab Results   Component Value Date    CR 1.00 03/18/2022    CR 0.99 08/28/2020    CR 0.99 09/25/2019    CR 0.96 05/08/2018    CR 1.14 04/26/2017    CR 0.98 08/11/2015    CR 1.1 08/12/2014    CR 1.07 02/15/2013     CR 1.14 11/29/2011    CR 1.12 08/04/2011    CR 0.96 04/12/2010     PSA   Date Value Ref Range Status   04/26/2017 0.77 0 - 4 ug/L Final     Comment:     Assay Method:  Chemiluminescence using Siemens Vista analyzer     Prostate Specific Antigen Screen   Date Value Ref Range Status   03/18/2022 0.90 0.00 - 4.00 ug/L Final             Imaging:    The following imaging exams were independently viewed and interpreted by me and discussed with patient:  CT Scan Abd/Pelvis: Abnormal: Left distal ureteral stone about 1 cm from the UVJ,  Right 2 mm renal stone             Assessment and Plan:     Assessment:     Kidney stone  Right nonobstructing renal stone  Left distal ureteral stone  No documented passage since last imaging currently taking tamsulosin, minimal discomfort but no significant pain  We will have him scheduled for a pelvis CT without contrast to document passage  If stone still present we will have scheduled for ureteroscopy  I have sent him more prescriptions for tamsulosin  Discussed about the details of the ureteroscopy and stent necessary afterwards  Issues with ureteroscopy were also discussed.    - Adult Urology  Referral  - CT Pelvis Soft Tissue wo Contrast; Future  - tamsulosin (FLOMAX) 0.4 MG capsule; Take 1 capsule (0.4 mg) by mouth daily for 14 days    Hydronephrosis of left kidney  Mild left hydronephrosis  Due to ureteral obstruction  - Adult Urology  Referral  - CT Pelvis Soft Tissue wo Contrast; Future  - tamsulosin (FLOMAX) 0.4 MG capsule; Take 1 capsule (0.4 mg) by mouth daily for 14 days    Plan:  CT pelvis to document stone persistence,  If stone present we will proceed ahead with ureteroscopy    Orders  Orders Placed This Encounter   Procedures    CT Pelvis Soft Tissue wo Contrast         Jony Lara MD  [facility]      ==========================    Additional Billing and Coding Information:  Review of external notes as documented above    Review of the result(s) of each unique test - UA, creatinine    Independent interpretation of a test performed by another physician/other qualified health care professional (not separately reported) -   CT abdomen pelvis discussed with the patient    Discussion of management or test interpretation with external physician/other qualified healthcare professional/appropriate source -           20 minutes spent by me on the date of the encounter doing chart review, review of test results, interpretation of tests, patient visit and documentation     ==========================

## 2023-06-05 ENCOUNTER — ANCILLARY PROCEDURE (OUTPATIENT)
Dept: CT IMAGING | Facility: CLINIC | Age: 58
End: 2023-06-05
Attending: STUDENT IN AN ORGANIZED HEALTH CARE EDUCATION/TRAINING PROGRAM
Payer: COMMERCIAL

## 2023-06-05 DIAGNOSIS — N13.30 HYDRONEPHROSIS OF LEFT KIDNEY: ICD-10-CM

## 2023-06-05 DIAGNOSIS — N20.0 KIDNEY STONE: ICD-10-CM

## 2023-06-05 PROCEDURE — 72192 CT PELVIS W/O DYE: CPT | Mod: TC | Performed by: RADIOLOGY

## 2023-06-06 PROCEDURE — 99000 SPECIMEN HANDLING OFFICE-LAB: CPT

## 2023-06-06 PROCEDURE — 82365 CALCULUS SPECTROSCOPY: CPT | Mod: 90

## 2023-06-07 ENCOUNTER — TELEPHONE (OUTPATIENT)
Dept: UROLOGY | Facility: CLINIC | Age: 58
End: 2023-06-07
Payer: COMMERCIAL

## 2023-06-07 DIAGNOSIS — N20.0 KIDNEY STONE: Primary | ICD-10-CM

## 2023-06-07 NOTE — TELEPHONE ENCOUNTER
Request faxed to [facility] to send patient 2 24hour testing kits.  Order placed for stone analysis.  Patient set up for vv in 3 months.  Flower Sanchez RN

## 2023-06-08 ENCOUNTER — LAB (OUTPATIENT)
Dept: LAB | Facility: CLINIC | Age: 58
End: 2023-06-08
Payer: COMMERCIAL

## 2023-06-08 DIAGNOSIS — N20.0 KIDNEY STONE: ICD-10-CM

## 2023-06-12 LAB
APPEARANCE STONE: NORMAL
COMPN STONE: NORMAL
SPECIMEN WT: 106 MG

## 2023-07-24 DIAGNOSIS — M10.9 ACUTE GOUTY ARTHRITIS: ICD-10-CM

## 2023-07-24 RX ORDER — ALLOPURINOL 300 MG/1
300 TABLET ORAL DAILY
Qty: 90 TABLET | Refills: 3 | Status: SHIPPED | OUTPATIENT
Start: 2023-07-24 | End: 2024-01-25

## 2023-08-09 ENCOUNTER — VIRTUAL VISIT (OUTPATIENT)
Dept: UROLOGY | Facility: CLINIC | Age: 58
End: 2023-08-09
Payer: COMMERCIAL

## 2023-08-09 DIAGNOSIS — N20.0 KIDNEY STONE: Primary | ICD-10-CM

## 2023-08-09 PROCEDURE — 99213 OFFICE O/P EST LOW 20 MIN: CPT | Mod: VID | Performed by: STUDENT IN AN ORGANIZED HEALTH CARE EDUCATION/TRAINING PROGRAM

## 2023-08-09 ASSESSMENT — PAIN SCALES - GENERAL: PAINLEVEL: NO PAIN (0)

## 2023-08-09 NOTE — PROGRESS NOTES
Patient is roomed via telephone for a telehealth visit.  Patient confirmed he is in the [facility] at the time of this appointment.  Patient understand that this visit is billable and agree to proceed with appointment.

## 2023-08-09 NOTE — PROGRESS NOTES
Video-Visit Details    Type of service:  Video Visit  Video start time: 1:37 PM  Video end time: 1:45 PM    Originating Location (pt. Location): Home    Distant Location (provider location):  On-site  Platform used for Video Visit: [employer]    HPI:  Favio Jacinto is a 57 year old male being seen for kidney stone follow-up.  Duration of problem: 2 months  Previous treatments: Medical expulsive therapy      Reviewed previous notes   Doing well  Passed the stone on his own  Currently not endorsing any discomfort  Here to discuss stone analysis and 24-hour urine evaluation       Review of Systems:  From intake questionnaire     Skin: negative  Eyes: negative  Ears/Nose/Throat: negative  Respiratory: No shortness of breath, dyspnea on exertion, cough, or hemoptysis  Cardiovascular: No chest pain or palpitations  Gastrointestinal: negative; no nausea/vomiting, constipation or diarrhea  Genitourinary: as per HPI  Musculoskeletal: negative  Neurologic: negative  Psychiatric: negative  Hematologic/Lymphatic/Immunologic: negative  Endocrine: negative         Physical Exam:   This is a virtual visit    Alert, no acute distress, oriented, conversant    Ears/nose/mouth: mouth:normal, good dentition  Respiratory: no respiratory distress, or pursed lip breathing  Cardiovascular:no obvious jugular venous distension present  Skin: no suspicious lesions or rashes on Visible body parts on the Screen  Neuro: Alert, oriented, speech and mentation normal  Psych: affect and mood normal, alert and oriented to person, place and time  Review of Imaging:  The following imaging exams were independently viewed and interpreted by me and discussed with patient:  CT Scan Abd/Pelvis: Abnormal: Stone in bladder lumen    Review of Labs:  The following labs were reviewed by me and discussed with the patient:  Stone analysis: Abnormal: Calcium oxalate monohydrate and dihydrate  24-hour urine evaluation:  Borderline low urine output  Borderline high  calcium and oxalate and sodium levels  Borderline low citrate    Assessment & Plan     Kidney stone  Discussed about preventive strategies especially in line with his stone analysis and 24-hour urine evaluation  Encourage 100 ounces of fluid intake per 24 hours  Increasing citrus foods in diet  Reducing oxalate rich foods and reducing sodium intake  He expressed understanding and will see us in a year with repeat imaging with ultrasound and Litholink x 1  - US Renal Complete Non-Vascular; Future      Jonyhung Lara MD  [facility]      ==========================    Additional Billing and Coding Information:  Review of external notes as documented above   Review of the result(s) of each unique test - Stone analysis, Litholink, CT pelvis    Independent interpretation of a test performed by another physician/other qualified health care professional (not separately reported) -       Discussion of management or test interpretation with external physician/other qualified healthcare professional/appropriate source -         14 minutes spent by me on the date of the encounter doing chart review, review of test results, interpretation of tests, patient visit, and documentation

## 2023-09-14 ENCOUNTER — TELEPHONE (OUTPATIENT)
Dept: FAMILY MEDICINE | Facility: CLINIC | Age: 58
End: 2023-09-14
Payer: COMMERCIAL

## 2023-09-14 DIAGNOSIS — M10.9 ACUTE GOUTY ARTHRITIS: ICD-10-CM

## 2023-09-14 NOTE — TELEPHONE ENCOUNTER
.Reason for Call:  Medication or medication refill: Medication Refill     Do you use a [facility]?  Name of the pharmacy and phone number for the current request:   [id number] [place] [address]       Name of the medication requested: HYDROcodone-acetaminophen (NORCO) 5-325 MG tablet     Other request: NA    Can we leave a detailed message on this number? YES    Phone number patient can be reached at: Home number on file [phone] (home)    Best Time: Any    Call taken on 9/14/2023 at 11:01 AM by Elissa Luna

## 2023-09-21 NOTE — TELEPHONE ENCOUNTER
Medication Question or Refill    Contacts         Type Contact Phone/Fax    09/14/2023 11:01 AM CDT Phone (Incoming) Mason Jacinto (Self) [phone] (M)            What medication are you calling about (include dose and sig)?:hydrocodone-acetamenophen (NORCO) 5-325 MG TABLET    Preferred Pharmacy:  [address] [address] [place] [facility] [address] [facility]  [address]  [facility] [address]  Phone: [phone] Fax: [phone]      Controlled Substance Agreement on file:   CSA -- Patient Level:    CSA: None found at the patient level.       Who prescribed the medication?: ELENA    Do you need a refill? Yes CALLED A WEEK AGO, NOW I AM OUT OF THESE PILLS. PLEASE FILL TODAY    When did you use the medication last? NA    Patient offered an appointment? No    Do you have any questions or concerns?  No      Could we send this information to you in [employer] or would you prefer to receive a phone call?:   Patient would prefer a phone call   Okay to leave a detailed message?: Yes at Home number on file [phone] (home)

## 2023-09-25 RX ORDER — HYDROCODONE BITARTRATE AND ACETAMINOPHEN 5; 325 MG/1; MG/1
TABLET ORAL
Qty: 60 TABLET | Refills: 0 | Status: SHIPPED | OUTPATIENT
Start: 2023-09-25 | End: 2024-01-23

## 2024-01-23 DIAGNOSIS — M10.9 ACUTE GOUTY ARTHRITIS: ICD-10-CM

## 2024-01-23 NOTE — TELEPHONE ENCOUNTER
Patient calling for norco refill, uses sparingly, last Rx was sent in September for gout pain.   He is due for annual visit in May, he will call to schedule that.    Pharmacy and med pat'd, routed to PCP to send.    Emma DAMIAN RN  [facility] Triage

## 2024-01-25 DIAGNOSIS — M10.9 ACUTE GOUTY ARTHRITIS: ICD-10-CM

## 2024-01-25 RX ORDER — ALLOPURINOL 300 MG/1
300 TABLET ORAL DAILY
Qty: 90 TABLET | Refills: 3 | Status: SHIPPED | OUTPATIENT
Start: 2024-01-25

## 2024-01-25 RX ORDER — HYDROCODONE BITARTRATE AND ACETAMINOPHEN 5; 325 MG/1; MG/1
TABLET ORAL
Qty: 60 TABLET | Refills: 0 | Status: CANCELLED | OUTPATIENT
Start: 2024-01-25

## 2024-01-25 RX ORDER — HYDROCODONE BITARTRATE AND ACETAMINOPHEN 5; 325 MG/1; MG/1
TABLET ORAL
Qty: 60 TABLET | Refills: 0 | Status: SHIPPED | OUTPATIENT
Start: 2024-01-25 | End: 2024-01-26

## 2024-01-25 NOTE — TELEPHONE ENCOUNTER
Dr. Stringer,     Patient stated his new insurance will not cover meds at [facility] but they will cover at [facility]. Meds need to be sent to [facility]. Meds and pharm cued but cannot send the norco. Need PCP.       Okay for detailed voice mail from team once meds sent so he can head over to pharm per patient.      Thanks,  SHAYLA Solomon  [facility]

## 2024-01-26 RX ORDER — HYDROCODONE BITARTRATE AND ACETAMINOPHEN 5; 325 MG/1; MG/1
TABLET ORAL
Qty: 60 TABLET | Refills: 0 | Status: SHIPPED | OUTPATIENT
Start: 2024-01-26 | End: 2024-05-21

## 2024-01-26 NOTE — TELEPHONE ENCOUNTER
Needs rx sent to [facility] due to insurance     Cued routing to [facility] as PCP GREGG    Please route back to team to update patient when rx sent     Megan Collins RN  [facility]

## 2024-01-29 NOTE — TELEPHONE ENCOUNTER
"I see \"E cancel\" was approved by pharmacy [facility] on 1/26/24, no need to call to cancel.      Emma DAMIAN RN  [facility] Triage    "

## 2024-05-21 DIAGNOSIS — M10.9 ACUTE GOUTY ARTHRITIS: ICD-10-CM

## 2024-05-21 RX ORDER — HYDROCODONE BITARTRATE AND ACETAMINOPHEN 5; 325 MG/1; MG/1
TABLET ORAL
Qty: 60 TABLET | Refills: 0 | Status: CANCELLED | OUTPATIENT
Start: 2024-05-21

## 2024-05-21 RX ORDER — HYDROCODONE BITARTRATE AND ACETAMINOPHEN 5; 325 MG/1; MG/1
TABLET ORAL
Qty: 60 TABLET | Refills: 0 | Status: SHIPPED | OUTPATIENT
Start: 2024-05-21 | End: 2024-09-16

## 2024-05-21 NOTE — TELEPHONE ENCOUNTER
Patient stated he needs this medication for gout and neuropathy.     RN reviewed chart. Appears patient is overdue for exam. RN updated, patient agrees to schedule annual wellness but still requests refill in meantime.     Amanda Thakkar RN on 5/21/2024 at 3:16 PM

## 2024-06-16 ENCOUNTER — HEALTH MAINTENANCE LETTER (OUTPATIENT)
Age: 59
End: 2024-06-16

## 2024-06-16 SDOH — HEALTH STABILITY: PHYSICAL HEALTH: ON AVERAGE, HOW MANY MINUTES DO YOU ENGAGE IN EXERCISE AT THIS LEVEL?: 60 MIN

## 2024-06-16 SDOH — HEALTH STABILITY: PHYSICAL HEALTH: ON AVERAGE, HOW MANY DAYS PER WEEK DO YOU ENGAGE IN MODERATE TO STRENUOUS EXERCISE (LIKE A BRISK WALK)?: 3 DAYS

## 2024-06-16 ASSESSMENT — SOCIAL DETERMINANTS OF HEALTH (SDOH): HOW OFTEN DO YOU GET TOGETHER WITH FRIENDS OR RELATIVES?: ONCE A WEEK

## 2024-06-21 ENCOUNTER — OFFICE VISIT (OUTPATIENT)
Dept: FAMILY MEDICINE | Facility: CLINIC | Age: 59
End: 2024-06-21
Payer: COMMERCIAL

## 2024-06-21 VITALS
DIASTOLIC BLOOD PRESSURE: 76 MMHG | OXYGEN SATURATION: 98 % | TEMPERATURE: 97.8 F | SYSTOLIC BLOOD PRESSURE: 108 MMHG | HEART RATE: 80 BPM | RESPIRATION RATE: 18 BRPM | BODY MASS INDEX: 26.32 KG/M2 | HEIGHT: 71 IN | WEIGHT: 188 LBS

## 2024-06-21 DIAGNOSIS — M1A.9XX0 CHRONIC GOUT WITHOUT TOPHUS, UNSPECIFIED CAUSE, UNSPECIFIED SITE: ICD-10-CM

## 2024-06-21 DIAGNOSIS — K21.9 GASTROESOPHAGEAL REFLUX DISEASE, UNSPECIFIED WHETHER ESOPHAGITIS PRESENT: ICD-10-CM

## 2024-06-21 DIAGNOSIS — Z12.5 SCREENING FOR PROSTATE CANCER: ICD-10-CM

## 2024-06-21 DIAGNOSIS — Z13.6 CARDIOVASCULAR SCREENING; LDL GOAL LESS THAN 160: ICD-10-CM

## 2024-06-21 DIAGNOSIS — Z00.00 ENCOUNTER FOR ROUTINE ADULT HEALTH EXAMINATION WITHOUT ABNORMAL FINDINGS: Primary | ICD-10-CM

## 2024-06-21 DIAGNOSIS — Q23.81 BICUSPID AORTIC VALVE: ICD-10-CM

## 2024-06-21 DIAGNOSIS — R73.09 ABNORMAL GLUCOSE: ICD-10-CM

## 2024-06-21 DIAGNOSIS — M10.9 ACUTE GOUTY ARTHRITIS: ICD-10-CM

## 2024-06-21 DIAGNOSIS — Z29.11 NEED FOR RSV VACCINATION: ICD-10-CM

## 2024-06-21 DIAGNOSIS — R20.0 NUMBNESS: ICD-10-CM

## 2024-06-21 LAB — HBA1C MFR BLD: 5.3 % (ref 0–5.6)

## 2024-06-21 PROCEDURE — 84550 ASSAY OF BLOOD/URIC ACID: CPT | Performed by: FAMILY MEDICINE

## 2024-06-21 PROCEDURE — 82607 VITAMIN B-12: CPT | Performed by: FAMILY MEDICINE

## 2024-06-21 PROCEDURE — 96381 ADMN RSV MONOC ANTB IM NJX: CPT | Performed by: FAMILY MEDICINE

## 2024-06-21 PROCEDURE — 80053 COMPREHEN METABOLIC PANEL: CPT | Performed by: FAMILY MEDICINE

## 2024-06-21 PROCEDURE — G0103 PSA SCREENING: HCPCS | Performed by: FAMILY MEDICINE

## 2024-06-21 PROCEDURE — 80061 LIPID PANEL: CPT | Performed by: FAMILY MEDICINE

## 2024-06-21 PROCEDURE — 99396 PREV VISIT EST AGE 40-64: CPT | Mod: 25 | Performed by: FAMILY MEDICINE

## 2024-06-21 PROCEDURE — 36415 COLL VENOUS BLD VENIPUNCTURE: CPT | Performed by: FAMILY MEDICINE

## 2024-06-21 PROCEDURE — 90678 RSV VACC PREF BIVALENT IM: CPT | Performed by: FAMILY MEDICINE

## 2024-06-21 PROCEDURE — 99214 OFFICE O/P EST MOD 30 MIN: CPT | Mod: 25 | Performed by: FAMILY MEDICINE

## 2024-06-21 PROCEDURE — 84443 ASSAY THYROID STIM HORMONE: CPT | Performed by: FAMILY MEDICINE

## 2024-06-21 PROCEDURE — 83036 HEMOGLOBIN GLYCOSYLATED A1C: CPT | Performed by: FAMILY MEDICINE

## 2024-06-21 ASSESSMENT — PAIN SCALES - GENERAL: PAINLEVEL: NO PAIN (0)

## 2024-06-21 NOTE — PATIENT INSTRUCTIONS
Blood tests today.     I would recommend an echocardiogram: [phone].    I would recommend a scope of your stomach. [facility] will call you to coordinate your care as prescribed by the provider.  If you don t hear from a representative within 2 business days, please call [phone].     No change medications.     Listen to your spouse.     Work on stopping chewing and reducing alcohol.     Yearly check up.     See the eye doctor.

## 2024-06-21 NOTE — PROGRESS NOTES
Preventive Care Visit  [facility] HAILY Stringer MD, Family Medicine  Jun 21, 2024      Assessment & Plan     (Z00.00) Encounter for routine adult health examination without abnormal findings  (primary encounter diagnosis)  Comment:  Reviewed the need for periodic healthcare exams and screenings.   Plan: REVIEW OF HEALTH MAINTENANCE PROTOCOL ORDERS        Advised yearly check ups.     (K21.9) Gastroesophageal reflux disease, unspecified whether esophagitis present  Comment: Ongoing symptoms.  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    (M1A.9XX0) Chronic gout without tophus, unspecified cause, unspecified site  Comment: No reoccurrence since taking allopurinol.  Uric acid level normal.  Plan: Uric acid        Continue allopurinol.    (M10.9) Acute gouty arthritis  Comment: No recent flares.      (Q23.1) Bicuspid aortic valve  Comment: Mild aortic stenosis seen on echocardiogram done 2 years ago.  No evidence of congestive heart failure or valvular insufficiency restriction.  Plan: Echocardiogram Complete        Will repeat echocardiogram for continued surveillance.    (Z12.5) Screening for prostate cancer  Comment: Normal.  Plan: PSA, screen        Repeat yearly.    (R73.09) Abnormal glucose  Comment: Signs of insulin resistance.  Normal kidney and liver function.  Plan: Hemoglobin A1c, Vitamin B12, Comprehensive         metabolic panel (BMP + Alb, Alk Phos, ALT, AST,        Total. Bili, TP)            (R20.0) Numbness  Comment: No signs of thyroid disorder, diabetes.  Plan: TSH with free T4 reflex        No signs of systemic etiology for his numbness.  He may have mild bilateral carpal tunnel syndrome involving the radial nerve.  Advised to monitor.    (Z29.11) Need for RSV vaccination  Plan: RSV VACCINE (ABRYSVO)            (Z13.6) CARDIOVASCULAR SCREENING; LDL GOAL LESS THAN 160  Comment:  Acceptable lipid profile.  Plan: Lipid panel reflex to direct LDL Fasting        Monitor.    Results for orders placed or performed in visit on 06/21/24   TSH with free T4 reflex     Status: Normal   Result Value Ref Range    TSH 3.11 0.30 - 4.20 uIU/mL   Hemoglobin A1c     Status: Normal   Result Value Ref Range    Hemoglobin A1C 5.3 0.0 - 5.6 %   Vitamin B12     Status: Normal   Result Value Ref Range    Vitamin B12 697 232 - 1,245 pg/mL   Comprehensive metabolic panel (BMP + Alb, Alk Phos, ALT, AST, Total. Bili, TP)     Status: Abnormal   Result Value Ref Range    Sodium 139 135 - 145 mmol/L    Potassium 4.9 3.4 - 5.3 mmol/L    Carbon Dioxide (CO2) 25 22 - 29 mmol/L    Anion Gap 8 7 - 15 mmol/L    Urea Nitrogen 13.6 8.0 - 23.0 mg/dL    Creatinine 1.03 0.67 - 1.17 mg/dL    GFR Estimate 83 >60 mL/min/1.73m2    Calcium 9.2 8.8 - 10.2 mg/dL    Chloride 106 98 - 107 mmol/L    Glucose 105 (H) 70 - 99 mg/dL    Alkaline Phosphatase 59 40 - 150 U/L    AST 28 0 - 45 U/L    ALT 27 0 - 70 U/L    Protein Total 6.9 6.4 - 8.3 g/dL    Albumin 4.5 3.5 - 5.2 g/dL    Bilirubin Total 0.6 <=1.2 mg/dL    Patient Fasting > 8hrs? Unknown    Uric acid     Status: Normal   Result Value Ref Range    Uric Acid 5.0 3.4 - 7.0 mg/dL   Lipid panel reflex to direct LDL Fasting     Status: Abnormal   Result Value Ref Range    Cholesterol 190 <200 mg/dL    Triglycerides 168 (H) <150 mg/dL    Direct Measure HDL 53 >=40 mg/dL    LDL Cholesterol Calculated 103 (H) <=100 mg/dL    Non HDL Cholesterol 137 (H) <130 mg/dL    Patient Fasting > 8hrs? Unknown     Narrative    Cholesterol  Desirable:  <200 mg/dL    Triglycerides  Normal:  Less than 150 mg/dL  Borderline High:  150-199 mg/dL  High:  200-499 mg/dL  Very High:  Greater than or equal to 500 mg/dL    Direct Measure HDL  Female:  Greater than or equal to 50 mg/dL   Male:  Greater than or equal to 40 mg/dL    LDL Cholesterol  Desirable:  <100mg/dL  Above Desirable:  100-129 mg/dL   Borderline High:   "130-159 mg/dL   High:  160-189 mg/dL   Very High:  >= 190 mg/dL    Non HDL Cholesterol  Desirable:  130 mg/dL  Above Desirable:  130-159 mg/dL  Borderline High:  160-189 mg/dL  High:  190-219 mg/dL  Very High:  Greater than or equal to 220 mg/dL   PSA, screen     Status: Normal   Result Value Ref Range    Prostate Specific Antigen Screen 1.09 0.00 - 4.50 ng/mL    Narrative    This result is obtained using the Roche Elecsys total PSA method on the tyrone e801 immunoassay analyzer. Results obtained with different assay methods or kits cannot be used interchangeably.        Patient Instructions   Blood tests today.     I would recommend an echocardiogram: [phone].    I would recommend a scope of your stomach. [facility] will call you to coordinate your care as prescribed by the provider.  If you don t hear from a representative within 2 business days, please call [phone].     No change medications.     Listen to your spouse.     Work on stopping chewing and reducing alcohol.     Yearly check up.     See the eye doctor.            BMI  Estimated body mass index is 26.17 kg/m  as calculated from the following:    Height as of this encounter: 1.805 m (5' 11.06\").    Weight as of this encounter: 85.3 kg (188 lb).   Weight management plan: Patient referred to endocrine and/or weight management specialty      Regular exercise    Jessica Cuevas is a 58 year old, presenting for the following:  Physical (Patient is fasting.)        6/21/2024     1:41 PM   Additional Questions   Roomed by Lennie/MA   Accompanied by Self         6/21/2024     1:41 PM   Patient Reported Additional Medications   Patient reports taking the following new medications N/A        Health Care Directive  Patient does not have a Health Care Directive or Living Will: Discussed advance care planning with patient; however, patient declined at this time.    HPI    Patient would like to discuss the following;  Bloating problems  Kidney " issues  Patient is fasting for labs.              6/16/2024   General Health   How would you rate your overall physical health? Good   Feel stress (tense, anxious, or unable to sleep) Not at all            6/16/2024   Nutrition   Three or more servings of calcium each day? (!) NO   Diet: Regular (no restrictions)   How many servings of fruit and vegetables per day? (!) 0-1   How many sweetened beverages each day? (!) 2            6/16/2024   Exercise   Days per week of moderate/strenous exercise 3 days   Average minutes spent exercising at this level 60 min            6/16/2024   Social Factors   Frequency of gathering with friends or relatives Once a week   Worry food won't last until get money to buy more No   Food not last or not have enough money for food? No   Do you have housing? (Housing is defined as stable permanent housing and does not include staying ouside in a car, in a tent, in an abandoned building, in an overnight shelter, or couch-surfing.) Yes   Are you worried about losing your housing? No   Lack of transportation? No   Unable to get utilities (heat,electricity)? No            6/16/2024   Fall Risk   Fallen 2 or more times in the past year? No   Trouble with walking or balance? No             6/16/2024   Dental   Dentist two times every year? (!) NO            6/16/2024   TB Screening   Were you born outside of the [place]? Yes            Today's PHQ-2 Score:       6/21/2024     1:41 PM   PHQ-2 ( 1999 [employer])   Q1: Little interest or pleasure in doing things 0   Q2: Feeling down, depressed or hopeless 0   PHQ-2 Score 0   Q1: Little interest or pleasure in doing things Not at all   Q2: Feeling down, depressed or hopeless Not at all   PHQ-2 Score 0           6/16/2024   Substance Use   Alcohol more than 3/day or more than 7/wk Yes   How often do you have a drink containing alcohol 4 or more times a week   How many alcohol drinks on typical day 5 or 6   How often do you have 5+ drinks at one occasion  Weekly   Audit 2/3 Score 5   How often not able to stop drinking once started Never   How often failed to do what normally expected Never   How often needed first drink in am after a heavy drinking session Never   How often feeling of guilt or remorse after drinking Monthly   How often unable to remember what happened the night before Daily or almost daily   Have you or someone else been injured because of your drinking No   Has anyone been concerned or suggested you cut down on drinking Yes, during the last year   TOTAL SCORE - AUDIT 19   Do you use any other substances recreationally? (!) ALCOHOL        Social History     Tobacco Use    Smoking status: Former     Types: Dip, chew, snus or snuff     Quit date: 2007     Years since quittin.4     Passive exposure: Never    Smokeless tobacco: Current     Types: Chew   Vaping Use    Vaping status: Never Used   Substance Use Topics    Alcohol use: Yes     Comment: occasional    Drug use: No             2024   One time HIV Screening   Previous HIV test? No          2024   STI Screening   New sexual partner(s) since last STI/HIV test? No      Last PSA:   PSA   Date Value Ref Range Status   2017 0.77 0 - 4 ug/L Final     Comment:     Assay Method:  Chemiluminescence using Siemens Vista analyzer     Prostate Specific Antigen Screen   Date Value Ref Range Status   2022 0.90 0.00 - 4.00 ug/L Final     ASCVD Risk   The 10-year ASCVD risk score (Connie VILLATORO, et al., 2019) is: 5.7%    Values used to calculate the score:      Age: 58 years      Sex: Male      Is Non- : No      Diabetic: No      Tobacco smoker: No      Systolic Blood Pressure: 108 mmHg      Is BP treated: No      HDL Cholesterol: 54 mg/dL      Total Cholesterol: 186 mg/dL           Reviewed and updated as needed this visit by Provider                    Labs reviewed in EPIC      Review of Systems  Constitutional, HEENT, cardiovascular, pulmonary, gi  "and gu systems are negative, except as otherwise noted.     Objective    Exam  /76   Pulse 80   Temp 97.8  F (36.6  C) (Tympanic)   Resp 18   Ht 1.805 m (5' 11.06\")   Wt 85.3 kg (188 lb)   SpO2 98%   BMI 26.17 kg/m     Estimated body mass index is 26.17 kg/m  as calculated from the following:    Height as of this encounter: 1.805 m (5' 11.06\").    Weight as of this encounter: 85.3 kg (188 lb).    Physical Exam  GENERAL: Healthy, alert and no distress  EYES: Eyes grossly normal to inspection, conjunctivae and sclerae normal  RESP: Lungs clear to auscultation - no rales, rhonchi or wheezes  CV: Regular rate and rhythm, normal S1 S2, no murmur  MS: No gross musculoskeletal defects noted, no edema  NEURO: Normal strength and tone, mentation intact and speech normal  PSYCH: Mentation appears normal, affect normal/bright         Signed Electronically by: Leah Stringer MD    "

## 2024-06-22 LAB
ALBUMIN SERPL BCG-MCNC: 4.5 G/DL (ref 3.5–5.2)
ALP SERPL-CCNC: 59 U/L (ref 40–150)
ALT SERPL W P-5'-P-CCNC: 27 U/L (ref 0–70)
ANION GAP SERPL CALCULATED.3IONS-SCNC: 8 MMOL/L (ref 7–15)
AST SERPL W P-5'-P-CCNC: 28 U/L (ref 0–45)
BILIRUB SERPL-MCNC: 0.6 MG/DL
BUN SERPL-MCNC: 13.6 MG/DL (ref 8–23)
CALCIUM SERPL-MCNC: 9.2 MG/DL (ref 8.8–10.2)
CHLORIDE SERPL-SCNC: 106 MMOL/L (ref 98–107)
CHOLEST SERPL-MCNC: 190 MG/DL
CREAT SERPL-MCNC: 1.03 MG/DL (ref 0.67–1.17)
DEPRECATED HCO3 PLAS-SCNC: 25 MMOL/L (ref 22–29)
EGFRCR SERPLBLD CKD-EPI 2021: 83 ML/MIN/1.73M2
FASTING STATUS PATIENT QL REPORTED: ABNORMAL
FASTING STATUS PATIENT QL REPORTED: ABNORMAL
GLUCOSE SERPL-MCNC: 105 MG/DL (ref 70–99)
HDLC SERPL-MCNC: 53 MG/DL
LDLC SERPL CALC-MCNC: 103 MG/DL
NONHDLC SERPL-MCNC: 137 MG/DL
POTASSIUM SERPL-SCNC: 4.9 MMOL/L (ref 3.4–5.3)
PROT SERPL-MCNC: 6.9 G/DL (ref 6.4–8.3)
PSA SERPL DL<=0.01 NG/ML-MCNC: 1.09 NG/ML (ref 0–4.5)
SODIUM SERPL-SCNC: 139 MMOL/L (ref 135–145)
TRIGL SERPL-MCNC: 168 MG/DL
TSH SERPL DL<=0.005 MIU/L-ACNC: 3.11 UIU/ML (ref 0.3–4.2)
URATE SERPL-MCNC: 5 MG/DL (ref 3.4–7)
VIT B12 SERPL-MCNC: 697 PG/ML (ref 232–1245)

## 2024-07-02 ENCOUNTER — ANESTHESIA EVENT (OUTPATIENT)
Dept: GASTROENTEROLOGY | Facility: CLINIC | Age: 59
End: 2024-07-02
Payer: COMMERCIAL

## 2024-07-02 ASSESSMENT — LIFESTYLE VARIABLES: TOBACCO_USE: 1

## 2024-07-02 ASSESSMENT — ENCOUNTER SYMPTOMS: DYSRHYTHMIAS: 1

## 2024-07-02 NOTE — ANESTHESIA PREPROCEDURE EVALUATION
Anesthesia Pre-Procedure Evaluation    Patient: Favio Jacinto   MRN: [id number] : 1965        Procedure : Procedure(s):  Esophagoscopy, gastroscopy, duodenoscopy (EGD), combined          Past Medical History:   Diagnosis Date    Sinusitis       Past Surgical History:   Procedure Laterality Date    COLONOSCOPY N/A 2022    Procedure: COLONOSCOPY, WITH POLYPECTOMY AND BIOPSY;  Surgeon: Parker Washington MD;  Location: [facility]    ESOPHAGOSCOPY, GASTROSCOPY, DUODENOSCOPY (EGD), COMBINED N/A 10/3/2019    Procedure: ESOPHAGOGASTRODUODENOSCOPY, WITH BIOPSY;  Surgeon: Favio Devries MD;  Location: [facility]    ESOPHAGOSCOPY, GASTROSCOPY, DUODENOSCOPY (EGD), COMBINED N/A 2022    Procedure: ESOPHAGOGASTRODUODENOSCOPY, WITH BIOPSY;  Surgeon: Parker Washington MD;  Location: [facility]    HERNIA REPAIR, INGUINAL RT/LT      ZZHC ARTHROTOMY W/OPEN MENISCUS REPAIR        Allergies   Allergen Reactions    Pcn [Penicillins]       Social History     Tobacco Use    Smoking status: Former     Types: Dip, chew, snus or snuff     Quit date: 2007     Years since quittin.5     Passive exposure: Never    Smokeless tobacco: Current     Types: Chew   Substance Use Topics    Alcohol use: Yes     Comment: occasional      Wt Readings from Last 1 Encounters:   24 85.3 kg (188 lb)        Anesthesia Evaluation   Pt has had prior anesthetic. Type: General and MAC.    No history of anesthetic complications       ROS/MED HX  ENT/Pulmonary:     (+)                tobacco use, Past use,                       Neurologic:  - neg neurologic ROS     Cardiovascular:     (+)  - -   -  - -                        dysrhythmias, a-fib,  valvular problems/murmurs type: AS     Previous cardiac testing   Echo: Date: 22 Results:  Left ventricular systolic function is normal. The visual ejection fraction is  estimated at 55-60%.  Bicuspid aortic valve with a complete LCC/RCC raphe.  Mild valvular aortic stenosis. with mean gradient of 19 mmHg  "and TRISH of 1.5  cm2.  The ascending aorta is mildly dilated.  Normal pulmonary artery pressure.  Compared to the study from 7/12/2019, the degree of aortic stenosis appears  similar.    Stress Test:  Date: Results:    ECG Reviewed:  Date: Results:    Cath:  Date: Results:      METS/Exercise Tolerance: >4 METS    Hematologic:  - neg hematologic  ROS     Musculoskeletal:  - neg musculoskeletal ROS     GI/Hepatic:     (+) GERD,                   Renal/Genitourinary:  - neg Renal ROS     Endo:  - neg endo ROS     Psychiatric/Substance Use:  - neg psychiatric ROS     Infectious Disease:  - neg infectious disease ROS     Malignancy:  - neg malignancy ROS     Other:  - neg other ROS          Physical Exam    Airway  airway exam normal      Mallampati: II   TM distance: > 3 FB   Neck ROM: full   Mouth opening: > 3 cm    Respiratory Devices and Support         Dental       (+) Minor Abnormalities - some fillings, tiny chips      Cardiovascular   cardiovascular exam normal       Rhythm and rate: regular and normal     Pulmonary   pulmonary exam normal        breath sounds clear to auscultation           OUTSIDE LABS:  CBC:   Lab Results   Component Value Date    WBC 6.9 09/25/2019    WBC 5.6 11/29/2011    HGB 15.1 09/25/2019    HGB 15.8 08/12/2014    HCT 44.4 09/25/2019    HCT 45.1 08/12/2014     09/25/2019     08/12/2014     BMP:   Lab Results   Component Value Date     06/21/2024     03/18/2022    POTASSIUM 4.9 06/21/2024    POTASSIUM 5.0 03/18/2022    CHLORIDE 106 06/21/2024    CHLORIDE 110 (H) 03/18/2022    CO2 25 06/21/2024    CO2 25 03/18/2022    BUN 13.6 06/21/2024    BUN 17 03/18/2022    CR 1.03 06/21/2024    CR 1.00 03/18/2022     (H) 06/21/2024    GLC 96 03/18/2022     COAGS:   Lab Results   Component Value Date    PTT 26 04/12/2010    INR 0.9 08/13/2014     POC: No results found for: \"BGM\", \"HCG\", \"HCGS\"  HEPATIC:   Lab Results   Component Value Date    ALBUMIN 4.5 06/21/2024    " "PROTTOTAL 6.9 06/21/2024    ALT 27 06/21/2024    AST 28 06/21/2024    ALKPHOS 59 06/21/2024    BILITOTAL 0.6 06/21/2024     OTHER:   Lab Results   Component Value Date    A1C 5.3 06/21/2024    GRACE 9.2 06/21/2024    LIPASE 262 09/25/2019    TSH 3.11 06/21/2024    CRP <2.9 05/05/2023       Anesthesia Plan    ASA Status:  2    NPO Status:  NPO Appropriate    Anesthesia Type: General.     - Airway: Native airway   Induction: Intravenous, Propofol.   Maintenance: TIVA.        Consents    Anesthesia Plan(s) and associated risks, benefits, and realistic alternatives discussed. Questions answered and patient/representative(s) expressed understanding.     - Discussed: Risks, Benefits and Alternatives for BOTH SEDATION and the PROCEDURE were discussed     - Discussed with:  Patient      - Extended Intubation/Ventilatory Support Discussed: No.      - Patient is DNR/DNI Status: No     Use of blood products discussed: No .     Postoperative Care    Pain management: Oral pain medications.   PONV prophylaxis: Ondansetron (or other 5HT-3), Background Propofol Infusion     Comments:               DEBBIE Covington CRNA    I have reviewed the pertinent notes and labs in the chart from the past 30 days and (re)examined the patient.  Any updates or changes from those notes are reflected in this note.              # Overweight: Estimated body mass index is 26.17 kg/m  as calculated from the following:    Height as of 6/21/24: 1.805 m (5' 11.06\").    Weight as of 6/21/24: 85.3 kg (188 lb).      "

## 2024-07-22 ENCOUNTER — HOSPITAL ENCOUNTER (OUTPATIENT)
Facility: CLINIC | Age: 59
Discharge: HOME | End: 2024-07-22
Attending: SURGERY | Admitting: SURGERY
Payer: COMMERCIAL

## 2024-07-22 ENCOUNTER — ANESTHESIA (OUTPATIENT)
Dept: GASTROENTEROLOGY | Facility: CLINIC | Age: 59
End: 2024-07-22
Payer: COMMERCIAL

## 2024-07-22 VITALS
TEMPERATURE: 98 F | RESPIRATION RATE: 16 BRPM | HEART RATE: 50 BPM | WEIGHT: 188 LBS | DIASTOLIC BLOOD PRESSURE: 71 MMHG | OXYGEN SATURATION: 98 % | HEIGHT: 71 IN | BODY MASS INDEX: 26.32 KG/M2 | SYSTOLIC BLOOD PRESSURE: 109 MMHG

## 2024-07-22 LAB — UPPER GI ENDOSCOPY: NORMAL

## 2024-07-22 PROCEDURE — 370N000017 HC ANESTHESIA TECHNICAL FEE, PER MIN: Performed by: SURGERY

## 2024-07-22 PROCEDURE — 43239 EGD BIOPSY SINGLE/MULTIPLE: CPT | Performed by: SURGERY

## 2024-07-22 PROCEDURE — 88305 TISSUE EXAM BY PATHOLOGIST: CPT | Mod: 26 | Performed by: PATHOLOGY

## 2024-07-22 PROCEDURE — 250N000009 HC RX 250: Performed by: NURSE ANESTHETIST, CERTIFIED REGISTERED

## 2024-07-22 PROCEDURE — 258N000003 HC RX IP 258 OP 636: Performed by: SURGERY

## 2024-07-22 PROCEDURE — 88305 TISSUE EXAM BY PATHOLOGIST: CPT | Mod: TC | Performed by: SURGERY

## 2024-07-22 PROCEDURE — 250N000011 HC RX IP 250 OP 636: Performed by: NURSE ANESTHETIST, CERTIFIED REGISTERED

## 2024-07-22 RX ORDER — LIDOCAINE HYDROCHLORIDE 20 MG/ML
INJECTION, SOLUTION INFILTRATION; PERINEURAL PRN
Status: DISCONTINUED | OUTPATIENT
Start: 2024-07-22 | End: 2024-07-22

## 2024-07-22 RX ORDER — NALOXONE HYDROCHLORIDE 0.4 MG/ML
0.2 INJECTION, SOLUTION INTRAMUSCULAR; INTRAVENOUS; SUBCUTANEOUS
Status: CANCELLED | OUTPATIENT
Start: 2024-07-22

## 2024-07-22 RX ORDER — NALOXONE HYDROCHLORIDE 0.4 MG/ML
0.4 INJECTION, SOLUTION INTRAMUSCULAR; INTRAVENOUS; SUBCUTANEOUS
Status: CANCELLED | OUTPATIENT
Start: 2024-07-22

## 2024-07-22 RX ORDER — PROPOFOL 10 MG/ML
INJECTION, EMULSION INTRAVENOUS CONTINUOUS PRN
Status: DISCONTINUED | OUTPATIENT
Start: 2024-07-22 | End: 2024-07-22

## 2024-07-22 RX ORDER — PROPOFOL 10 MG/ML
INJECTION, EMULSION INTRAVENOUS PRN
Status: DISCONTINUED | OUTPATIENT
Start: 2024-07-22 | End: 2024-07-22

## 2024-07-22 RX ORDER — ONDANSETRON 2 MG/ML
4 INJECTION INTRAMUSCULAR; INTRAVENOUS
Status: DISCONTINUED | OUTPATIENT
Start: 2024-07-22 | End: 2024-07-22 | Stop reason: HOSPADM

## 2024-07-22 RX ORDER — FLUMAZENIL 0.1 MG/ML
0.2 INJECTION, SOLUTION INTRAVENOUS
Status: CANCELLED | OUTPATIENT
Start: 2024-07-22 | End: 2024-07-23

## 2024-07-22 RX ORDER — LIDOCAINE 40 MG/G
CREAM TOPICAL
Status: DISCONTINUED | OUTPATIENT
Start: 2024-07-22 | End: 2024-07-22 | Stop reason: HOSPADM

## 2024-07-22 RX ORDER — SODIUM CHLORIDE, SODIUM LACTATE, POTASSIUM CHLORIDE, CALCIUM CHLORIDE 600; 310; 30; 20 MG/100ML; MG/100ML; MG/100ML; MG/100ML
INJECTION, SOLUTION INTRAVENOUS CONTINUOUS
Status: DISCONTINUED | OUTPATIENT
Start: 2024-07-22 | End: 2024-07-22 | Stop reason: HOSPADM

## 2024-07-22 RX ADMIN — PROPOFOL 150 MCG/KG/MIN: 10 INJECTION, EMULSION INTRAVENOUS at 12:49

## 2024-07-22 RX ADMIN — LIDOCAINE HYDROCHLORIDE 100 MG: 20 INJECTION, SOLUTION INFILTRATION; PERINEURAL at 12:49

## 2024-07-22 RX ADMIN — SODIUM CHLORIDE, POTASSIUM CHLORIDE, SODIUM LACTATE AND CALCIUM CHLORIDE: 600; 310; 30; 20 INJECTION, SOLUTION INTRAVENOUS at 11:49

## 2024-07-22 RX ADMIN — PROPOFOL 100 MG: 10 INJECTION, EMULSION INTRAVENOUS at 12:49

## 2024-07-22 ASSESSMENT — ACTIVITIES OF DAILY LIVING (ADL)
ADLS_ACUITY_SCORE: 35
ADLS_ACUITY_SCORE: 35

## 2024-07-22 NOTE — ANESTHESIA POSTPROCEDURE EVALUATION
Patient: Favio Jacinto    Procedure: Procedure(s):  ESOPHAGOGASTRODUODENOSCOPY, WITH BIOPSY       Anesthesia Type:  General    Note:  Disposition: Outpatient   Postop Pain Control: Uneventful            Sign Out: Well controlled pain   PONV: No   Neuro/Psych: Uneventful            Sign Out: Acceptable/Baseline neuro status   Airway/Respiratory: Uneventful            Sign Out: Acceptable/Baseline resp. status   CV/Hemodynamics: Uneventful            Sign Out: Acceptable CV status; No obvious hypovolemia; No obvious fluid overload   Other NRE: NONE   DID A NON-ROUTINE EVENT OCCUR? No           Last vitals:  Vitals Value Taken Time   /71 07/22/24 1330   Temp 36.7  C (98  F) 07/22/24 1306   Pulse 50 07/22/24 1330   Resp 16 07/22/24 1315   SpO2 99 % 07/22/24 1332   Vitals shown include unfiled device data.    Electronically Signed By: Levy Posey CRNA, APRN CRNA  July 22, 2024  1:32 PM

## 2024-07-22 NOTE — ANESTHESIA CARE TRANSFER NOTE
Patient: Favio Jacinto    Procedure: Procedure(s):  ESOPHAGOGASTRODUODENOSCOPY, WITH BIOPSY       Diagnosis: Gastroesophageal reflux disease, unspecified whether esophagitis present [K21.9]  Diagnosis Additional Information: No value filed.    Anesthesia Type:   General     Note:    Oropharynx: oropharynx clear of all foreign objects and spontaneously breathing  Level of Consciousness: awake  Oxygen Supplementation: nasal cannula  Level of Supplemental Oxygen (L/min / FiO2): 2  Independent Airway: airway patency satisfactory and stable  Dentition: dentition unchanged  Vital Signs Stable: post-procedure vital signs reviewed and stable  Report to RN Given: handoff report given  Patient transferred to: Phase II    Handoff Report: Identifed the Patient, Identified the Reponsible Provider, Reviewed the pertinent medical history, Discussed the surgical course, Reviewed Intra-OP anesthesia mangement and issues during anesthesia, Set expectations for post-procedure period and Allowed opportunity for questions and acknowledgement of understanding    Vitals:  Vitals Value Taken Time   BP     Temp     Pulse     Resp     SpO2         Electronically Signed By: Levy Posey CRNA, APRN CRNA  July 22, 2024  1:07 PM

## 2024-07-22 NOTE — LETTER
Favio Jacinto  [address] [address]  August 2, 2024    Dear Favio,   This letter is to inform you of the results of your pathology report on your upper endoscopy (EGD).    Your pathology report was:  Showed findings consistent with eosinophilic esophagitis.  Please see your primary care clinic for management and follow-up.    Final Diagnosis   A(1). Stomach, biopsy:  - Antral and oxyntic type gastric mucosa with no significant pathological changes.  - Negative for H. Pylori organisms on routine stains.  - Negative for intestinal metaplasia.   -Negative for dysplasia or malignancy.        B(2).  Gastroesophageal junction, biopsy:  -Squamous mucosa with prominent intraepithelial eosinophils and prominent reactive epithelial changes.  -Approximately 70 eosinophils per high-power field identified in maximal focus.  -Adjacent gastric type columnar mucosa with chronic inflammation and reactive epithelial changes  -Negative for intestinal metaplasia.  -Negative for dysplasia or malignancy        C(3).  Esophagus, distal, biopsy:  -Squamous mucosa with prominent intraepithelial eosinophils and prominent reactive epithelial changes.  -Approximately 70 eosinophils per high-power field identified in maximal focus.  -Negative for intestinal metaplasia.  -Negative for dysplasia or malignancy     D(4). Esophagus, proximal, biopsy:  -Oxyntic type gastric mucosa with chronic inflammation and adjacent squamous mucosa suggestive of inlet patch.  -Negative for intestinal metaplasia  -Negative for dysplasia or malignancy       If you have any questions or concerns please do not hesitate to call my office at [phone].  Sincerely,   Jhony Farrell, DO   [facility]

## 2024-07-22 NOTE — H&P
[facility]    Pre-Endoscopy History and Physical     Favio Jacinto MRN# [id number]   YOB: 1965 Age: 58 year old     Date of Procedure: 7/22/2024  Primary care provider: Leah Stringer  Type of Endoscopy: Gastroscopy with possible biopsy, possible dilation  Reason for Procedure: reflux  Type of Anesthesia Anticipated: Conscious Sedation    HPI:    Favio is a 58 year old male who will be undergoing the above procedure.      Ongoing reflux.  Denies dysphagia.  Eosinophilic esophagitis was found.  He states his symptoms are minimal, he has not taken any medications.    A history and physical has been performed. The patient's medications and allergies have been reviewed. The risks and benefits of the procedure and the sedation options and risks were discussed with the patient.  All questions were answered and informed consent was obtained.      He denies a personal or family history of anesthesia complications or bleeding disorders.     Patient Active Problem List   Diagnosis    Bicuspid aortic valve    Aortic stenosis    GERD (gastroesophageal reflux disease)    Ex-smoker    CARDIOVASCULAR SCREENING; LDL GOAL LESS THAN 160    Gout, chronic    Polyp of colon, hyperplastic    Atrial fibrillation (H)        Past Medical History:   Diagnosis Date    Sinusitis         Past Surgical History:   Procedure Laterality Date    COLONOSCOPY N/A 5/16/2022    Procedure: COLONOSCOPY, WITH POLYPECTOMY AND BIOPSY;  Surgeon: Parker Washington MD;  Location: [facility]    ESOPHAGOSCOPY, GASTROSCOPY, DUODENOSCOPY (EGD), COMBINED N/A 10/3/2019    Procedure: ESOPHAGOGASTRODUODENOSCOPY, WITH BIOPSY;  Surgeon: Favio Devries MD;  Location: [facility]    ESOPHAGOSCOPY, GASTROSCOPY, DUODENOSCOPY (EGD), COMBINED N/A 6/30/2022    Procedure: ESOPHAGOGASTRODUODENOSCOPY, WITH BIOPSY;  Surgeon: Parker Washington MD;  Location: [facility]    HERNIA REPAIR, INGUINAL RT/LT      ZZHC ARTHROTOMY W/OPEN MENISCUS REPAIR         Social  "History     Tobacco Use    Smoking status: Former     Types: Dip, chew, snus or snuff     Quit date: 2007     Years since quittin.5     Passive exposure: Never    Smokeless tobacco: Current     Types: Chew   Substance Use Topics    Alcohol use: Yes     Comment: occasional       Family History   Problem Relation Age of Onset    Cancer Father          at 60, smoker lung    Cancer Brother         testicular cancer, doing well. Skin cancer    Other Cancer Brother         Liver and lung    Respiratory Mother         asthma-uses Advair       Prior to Admission medications    Medication Sig Start Date End Date Taking? Authorizing Provider   allopurinol (ZYLOPRIM) 300 MG tablet Take 1 tablet (300 mg) by mouth daily To prevent gout. 24  Yes Leah Stringer MD   HYDROcodone-acetaminophen (NORCO) 5-325 MG tablet Take 1-2 tablets by mouth q 4 hours prn severe pain. Needs appointment for further refills. 24  Yes Leah Stringer MD       Allergies   Allergen Reactions    Pcn [Penicillins]         REVIEW OF SYSTEMS:   5 point ROS negative except as noted above in HPI, including Gen., Resp., CV, GI &  system review.    PHYSICAL EXAM:   /78 (BP Location: Left arm)   Pulse 54   Temp 98.6  F (37  C) (Oral)   Resp 16   Ht 1.805 m (5' 11.06\")   Wt 85.3 kg (188 lb)   SpO2 98%   BMI 26.18 kg/m   Estimated body mass index is 26.18 kg/m  as calculated from the following:    Height as of this encounter: 1.805 m (5' 11.06\").    Weight as of this encounter: 85.3 kg (188 lb).   Constitutional: Awake, alert, no acute distress.  Eyes: No scleral icterus.  Conjunctiva are without injection.  ENMT: Mucous membranes moist, dentition and gums are intact.   Neck: Soft, supple, trachea midline.    Endocrine: n/a   Lymphatic: There is no cervical, submandibularadenopathy.  Respiratory: normal effortgs   Cardiovascular: S1, S2  Abdomen: Non-distended, non-tender,  No masses,  Musculoskeletal: No spinal or CVA " tenderness. Full range of motion in the upper and lower extremities.    Skin: No skin rashes or lesions to inspection.  No petechia.    Neurologic: alerted and oriented 3x  Psychiatric: The patient's affect is not blunted and mood is appropriate.  DIAGNOSTICS:    Not indicated    IMPRESSION   ASA Class 2 - Mild systemic disease    PLAN:   Plan for Gastroscopy with possible biopsy, possible dilation. We discussed the risks, benefits and alternatives and the patient wished to proceed.  Patient is cleared for the above procedure.    The above has been forwarded to the consulting provider.    Jhony Farrell DO  [facility]

## 2024-07-23 ENCOUNTER — HOSPITAL ENCOUNTER (OUTPATIENT)
Dept: CARDIOLOGY | Facility: CLINIC | Age: 59
Discharge: HOME | End: 2024-07-23
Attending: FAMILY MEDICINE | Admitting: FAMILY MEDICINE
Payer: COMMERCIAL

## 2024-07-23 DIAGNOSIS — Q23.81 BICUSPID AORTIC VALVE: ICD-10-CM

## 2024-07-23 LAB — LVEF ECHO: NORMAL

## 2024-07-23 PROCEDURE — 93306 TTE W/DOPPLER COMPLETE: CPT

## 2024-07-23 PROCEDURE — 93306 TTE W/DOPPLER COMPLETE: CPT | Mod: 26 | Performed by: INTERNAL MEDICINE

## 2024-08-07 ENCOUNTER — TELEPHONE (OUTPATIENT)
Dept: FAMILY MEDICINE | Facility: CLINIC | Age: 59
End: 2024-08-07
Payer: COMMERCIAL

## 2024-08-07 DIAGNOSIS — K52.81 EOSINOPHILIC GASTRITIS: Primary | ICD-10-CM

## 2024-08-07 NOTE — TELEPHONE ENCOUNTER
"Patient calling as he has not received results of his recent echocardiogram from 7/23/24 and EGD from 7/22/24    The result for the echo was released to [employer] but the provider's result note was not released.  Reviewed the result note with patient and he verbalized understanding  Also reviewed the EGD result letter dated 8/2/24  Per EGD result- \"Showed findings consistent with eosinophilic esophagitis. Please see your primary care clinic for management and follow-up\"    Please advise on next steps based on EGD results  Should patient schedule a virtual visit to discuss?    Ok to leave detailed message on patient's VM    Nancy Perez RN  [facility] [place]            "

## 2024-08-15 NOTE — TELEPHONE ENCOUNTER
Message left on patient's voicemail.  Reviewed echocardiogram.  There is been some progression of his aortic stenosis, I recommended repeating his echocardiogram in 1 year.  No treatment is needed for this condition at this time.    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  I placed a referral to GI instructed that their scheduling office would contact him to set up an appointment.[facility] will call you to coordinate your care as prescribed by the provider. If you don t hear from a representative within 2 business days, please call [phone].

## 2024-09-12 ENCOUNTER — MYC MEDICAL ADVICE (OUTPATIENT)
Dept: FAMILY MEDICINE | Facility: CLINIC | Age: 59
End: 2024-09-12

## 2024-09-12 ENCOUNTER — APPOINTMENT (OUTPATIENT)
Dept: ULTRASOUND IMAGING | Facility: CLINIC | Age: 59
End: 2024-09-12
Attending: FAMILY MEDICINE
Payer: COMMERCIAL

## 2024-09-12 ENCOUNTER — HOSPITAL ENCOUNTER (EMERGENCY)
Facility: CLINIC | Age: 59
Discharge: HOME | End: 2024-09-12
Attending: EMERGENCY MEDICINE | Admitting: EMERGENCY MEDICINE
Payer: COMMERCIAL

## 2024-09-12 ENCOUNTER — NURSE TRIAGE (OUTPATIENT)
Dept: FAMILY MEDICINE | Facility: CLINIC | Age: 59
End: 2024-09-12

## 2024-09-12 VITALS
DIASTOLIC BLOOD PRESSURE: 75 MMHG | WEIGHT: 185 LBS | SYSTOLIC BLOOD PRESSURE: 113 MMHG | HEART RATE: 64 BPM | OXYGEN SATURATION: 97 % | BODY MASS INDEX: 25.06 KG/M2 | HEIGHT: 72 IN | RESPIRATION RATE: 18 BRPM | TEMPERATURE: 98.6 F

## 2024-09-12 DIAGNOSIS — I82.402 ACUTE DEEP VEIN THROMBOSIS (DVT) OF LEFT LOWER EXTREMITY, UNSPECIFIED VEIN (H): ICD-10-CM

## 2024-09-12 LAB
ALBUMIN SERPL BCG-MCNC: 4.2 G/DL (ref 3.5–5.2)
ALP SERPL-CCNC: 66 U/L (ref 40–150)
ALT SERPL W P-5'-P-CCNC: 18 U/L (ref 0–70)
ANION GAP SERPL CALCULATED.3IONS-SCNC: 10 MMOL/L (ref 7–15)
APTT PPP: 25 SECONDS (ref 22–38)
AST SERPL W P-5'-P-CCNC: 23 U/L (ref 0–45)
BASOPHILS # BLD AUTO: 0 10E3/UL (ref 0–0.2)
BASOPHILS NFR BLD AUTO: 0 %
BILIRUB SERPL-MCNC: 0.3 MG/DL
BUN SERPL-MCNC: 17.1 MG/DL (ref 8–23)
CALCIUM SERPL-MCNC: 9.3 MG/DL (ref 8.8–10.4)
CHLORIDE SERPL-SCNC: 102 MMOL/L (ref 98–107)
CREAT SERPL-MCNC: 1.1 MG/DL (ref 0.67–1.17)
EGFRCR SERPLBLD CKD-EPI 2021: 77 ML/MIN/1.73M2
EOSINOPHIL # BLD AUTO: 0 10E3/UL (ref 0–0.7)
EOSINOPHIL NFR BLD AUTO: 0 %
ERYTHROCYTE [DISTWIDTH] IN BLOOD BY AUTOMATED COUNT: 11.9 % (ref 10–15)
GLUCOSE SERPL-MCNC: 116 MG/DL (ref 70–99)
HCO3 SERPL-SCNC: 28 MMOL/L (ref 22–29)
HCT VFR BLD AUTO: 42.3 % (ref 40–53)
HGB BLD-MCNC: 14.6 G/DL (ref 13.3–17.7)
IMM GRANULOCYTES # BLD: 0 10E3/UL
IMM GRANULOCYTES NFR BLD: 0 %
INR PPP: 0.96 (ref 0.85–1.15)
LYMPHOCYTES # BLD AUTO: 0.7 10E3/UL (ref 0.8–5.3)
LYMPHOCYTES NFR BLD AUTO: 7 %
MCH RBC QN AUTO: 31.8 PG (ref 26.5–33)
MCHC RBC AUTO-ENTMCNC: 34.5 G/DL (ref 31.5–36.5)
MCV RBC AUTO: 92 FL (ref 78–100)
MONOCYTES # BLD AUTO: 0.3 10E3/UL (ref 0–1.3)
MONOCYTES NFR BLD AUTO: 3 %
NEUTROPHILS # BLD AUTO: 10 10E3/UL (ref 1.6–8.3)
NEUTROPHILS NFR BLD AUTO: 90 %
NRBC # BLD AUTO: 0 10E3/UL
NRBC BLD AUTO-RTO: 0 /100
PLATELET # BLD AUTO: 222 10E3/UL (ref 150–450)
POTASSIUM SERPL-SCNC: 4.7 MMOL/L (ref 3.4–5.3)
PROT SERPL-MCNC: 7.3 G/DL (ref 6.4–8.3)
RBC # BLD AUTO: 4.59 10E6/UL (ref 4.4–5.9)
SODIUM SERPL-SCNC: 140 MMOL/L (ref 135–145)
WBC # BLD AUTO: 11.1 10E3/UL (ref 4–11)

## 2024-09-12 PROCEDURE — 82040 ASSAY OF SERUM ALBUMIN: CPT | Performed by: EMERGENCY MEDICINE

## 2024-09-12 PROCEDURE — 85041 AUTOMATED RBC COUNT: CPT | Performed by: EMERGENCY MEDICINE

## 2024-09-12 PROCEDURE — 93010 ELECTROCARDIOGRAM REPORT: CPT | Performed by: EMERGENCY MEDICINE

## 2024-09-12 PROCEDURE — 36415 COLL VENOUS BLD VENIPUNCTURE: CPT | Performed by: EMERGENCY MEDICINE

## 2024-09-12 PROCEDURE — 99284 EMERGENCY DEPT VISIT MOD MDM: CPT | Performed by: EMERGENCY MEDICINE

## 2024-09-12 PROCEDURE — 85610 PROTHROMBIN TIME: CPT | Performed by: EMERGENCY MEDICINE

## 2024-09-12 PROCEDURE — 93971 EXTREMITY STUDY: CPT | Mod: LT

## 2024-09-12 PROCEDURE — 85730 THROMBOPLASTIN TIME PARTIAL: CPT | Performed by: EMERGENCY MEDICINE

## 2024-09-12 PROCEDURE — 93005 ELECTROCARDIOGRAM TRACING: CPT | Performed by: EMERGENCY MEDICINE

## 2024-09-12 PROCEDURE — 99285 EMERGENCY DEPT VISIT HI MDM: CPT | Mod: 25 | Performed by: EMERGENCY MEDICINE

## 2024-09-12 RX ORDER — APIXABAN 5 MG (74)
KIT ORAL
Qty: 1 EACH | Refills: 0 | Status: SHIPPED | OUTPATIENT
Start: 2024-09-12 | End: 2024-10-02

## 2024-09-12 ASSESSMENT — ACTIVITIES OF DAILY LIVING (ADL)
ADLS_ACUITY_SCORE: 33
ADLS_ACUITY_SCORE: 35
ADLS_ACUITY_SCORE: 33

## 2024-09-12 ASSESSMENT — COLUMBIA-SUICIDE SEVERITY RATING SCALE - C-SSRS
6. HAVE YOU EVER DONE ANYTHING, STARTED TO DO ANYTHING, OR PREPARED TO DO ANYTHING TO END YOUR LIFE?: NO
2. HAVE YOU ACTUALLY HAD ANY THOUGHTS OF KILLING YOURSELF IN THE PAST MONTH?: NO
1. IN THE PAST MONTH, HAVE YOU WISHED YOU WERE DEAD OR WISHED YOU COULD GO TO SLEEP AND NOT WAKE UP?: NO

## 2024-09-12 NOTE — TELEPHONE ENCOUNTER
Provider Response to 2nd Level Triage Request    I have reviewed the RN documentation. My recommendation is:  I attempted to call the patient, unable to reach him but I did leave a voicemail after confirming the patient's phone number.  I advised this sounds like a blood clot in his leg, DVT, and the most appropriate step would be for him to go to the emergency room immediately.  I also sent a [employer] message to the patient outlining my concerns and the need for him to go to the emergency room.  Also mention the possibility of a pulmonary embolism, which can be fatal.  Reviewed that, in clinic, we cannot easily obtain the necessary diagnostic testing, ultrasound and possible CT, in a timely manner.  Provider Response to 2nd Level Triage Request

## 2024-09-12 NOTE — ED PROVIDER NOTES
History     Chief Complaint   Patient presents with    Leg Pain     HPI  History per patient, review of [facility] EMR and Care Everywhere EMR.    Favio Jacinto is a 58 year old male with history of atrial fibrillation documented in October 2018, aortic stenosis and bicuspid aortic valve who presents emergency department for evaluation of right calf pain and swelling of insidious onset 3 days ago with no preceding injury, trauma or apparent precipitant.  He reports last weekend, today is Thursday, he felt some soreness in the medial left thigh, but that resolved prior to onset of left calf pain.  No prior history of DVT or PE.  He reports his mother has a history of PE.  He has no signs or symptoms of PE.  He does not smoke but uses chewing tobacco.  No anticoagulation therapy.  No other pertinent history or acute complaints or concerns.    Allergies:  Allergies   Allergen Reactions    Pcn [Penicillins]        Problem List:    Patient Active Problem List    Diagnosis Date Noted    Atrial fibrillation (H) 10/31/2018     Priority: Medium    Polyp of colon, hyperplastic 08/20/2014     Priority: Medium    Gout, chronic 08/04/2011     Priority: Medium     February 24, 2013 no recent flairs, normal renal function, uric acid 8.3, not on prophylactic treatment at this time. Hydration, avoid high protein foods, Monitor.   July 31, 2013 recent flair. He does not seem to respond to NSAIDs but does respond well to a prednisone burst. Would recommend this first line with gouty flairs.       CARDIOVASCULAR SCREENING; LDL GOAL LESS THAN 160 10/31/2010     Priority: Medium    Ex-smoker 05/11/2010     Priority: Medium    Bicuspid aortic valve 04/29/2010     Priority: Medium     Echo 4/2010      Aortic stenosis 04/29/2010     Priority: Medium     February 15, 2013 Mild echo 4/2010, repeat echo next year.           GERD (gastroesophageal reflux disease) 04/29/2010     Priority: Medium     Gastroscopy 2004          Past Medical  History:    Past Medical History:   Diagnosis Date    Sinusitis        Past Surgical History:    Past Surgical History:   Procedure Laterality Date    COLONOSCOPY N/A 2022    Procedure: COLONOSCOPY, WITH POLYPECTOMY AND BIOPSY;  Surgeon: Parker Washington MD;  Location: [facility]    ESOPHAGOSCOPY, GASTROSCOPY, DUODENOSCOPY (EGD), COMBINED N/A 10/3/2019    Procedure: ESOPHAGOGASTRODUODENOSCOPY, WITH BIOPSY;  Surgeon: Favio Devries MD;  Location: [facility]    ESOPHAGOSCOPY, GASTROSCOPY, DUODENOSCOPY (EGD), COMBINED N/A 2022    Procedure: ESOPHAGOGASTRODUODENOSCOPY, WITH BIOPSY;  Surgeon: Parker Washington MD;  Location: [facility]    ESOPHAGOSCOPY, GASTROSCOPY, DUODENOSCOPY (EGD), COMBINED N/A 2024    Procedure: ESOPHAGOGASTRODUODENOSCOPY, WITH BIOPSY;  Surgeon: Jhony Farrell DO;  Location: [facility]    HERNIA REPAIR, INGUINAL RT/LT      ZZHC ARTHROTOMY W/OPEN MENISCUS REPAIR         Family History:    Family History   Problem Relation Age of Onset    Cancer Father          at 60, smoker lung    Cancer Brother         testicular cancer, doing well. Skin cancer    Other Cancer Brother         Liver and lung    Respiratory Mother         asthma-uses Advair       Social History:  Marital Status:   [2]  Social History     Tobacco Use    Smoking status: Former     Types: Dip, chew, snus or snuff     Quit date: 2007     Years since quittin.7     Passive exposure: Never    Smokeless tobacco: Current     Types: Chew   Vaping Use    Vaping status: Never Used   Substance Use Topics    Alcohol use: Yes     Comment: occasional    Drug use: No        Medications:    Apixaban Starter Pack (ELIQUIS DVT/PE STARTER PACK) 5 MG TBPK  allopurinol (ZYLOPRIM) 300 MG tablet  HYDROcodone-acetaminophen (NORCO) 5-325 MG tablet      Review of Systems  As mentioned in the HPI, in addition focused review of systems was negative.    Physical Exam   BP: 135/77  Pulse: 77  Temp: 98.6  F (37  C)  Resp: 18  Height: 182.9 cm  (6')  Weight: 83.9 kg (185 lb)  SpO2: 98 %      Physical Exam  Vitals and nursing note reviewed.   Constitutional:       General: He is not in acute distress.     Appearance: Normal appearance. He is well-developed. He is not ill-appearing or diaphoretic.   Eyes:      General: No scleral icterus.     Extraocular Movements: Extraocular movements intact.      Conjunctiva/sclera: Conjunctivae normal.   Neck:      Trachea: No tracheal deviation.   Cardiovascular:      Rate and Rhythm: Normal rate and regular rhythm.      Pulses: Normal pulses.      Heart sounds: Normal heart sounds. No murmur heard.     No friction rub. No gallop.   Pulmonary:      Effort: Pulmonary effort is normal. No respiratory distress.      Breath sounds: Normal breath sounds. No wheezing, rhonchi or rales.   Musculoskeletal:         General: No tenderness. Normal range of motion.      Right lower leg: No edema.      Comments: Mild and pitting edema in the distal left lower extremity calf area with mild posterior calf tenderness but no erythema, warmth, fluctuance, crepitus, rash, lesions or cords.  No other left lower extremity/left leg abnormality and CMS/neurovascular function intact   Skin:     General: Skin is warm and dry.      Coloration: Skin is not pale.      Findings: No bruising, erythema or rash.   Neurological:      General: No focal deficit present.      Mental Status: He is alert and oriented to person, place, and time.      Sensory: No sensory deficit.      Motor: No weakness.   Psychiatric:         Mood and Affect: Mood normal.         Behavior: Behavior normal.     ED Course        Procedures              Results for orders placed or performed during the hospital encounter of 09/12/24 (from the past 24 hour(s))   US Lower Extremity Venous Duplex Left    Narrative    VENOUS ULTRASOUND LEFT LEG  9/12/2024 1:40 PM     HISTORY: left calf pain and swelling since Monday    COMPARISON: None.    FINDINGS:  Examination of the deep veins  with graded compression and  color flow Doppler with spectral wave form analysis was performed.   There is occlusive DVT extending from the proximal left superficial  femoral vein into the popliteal vein. This becomes nonocclusive in the  left posterior tibial and peroneal veins. In addition, there is  occlusive DVT in the left deep femoral vein.      Impression    IMPRESSION: Left lower extremity DVT as above.    MARY FERGUSON MD         SYSTEM ID:  [id number]   CBC with platelets differential    Narrative    The following orders were created for panel order CBC with platelets differential.  Procedure                               Abnormality         Status                     ---------                               -----------         ------                     CBC with platelets and d...[[id number]]  Abnormal            Final result                 Please view results for these tests on the individual orders.   Comprehensive metabolic panel   Result Value Ref Range    Sodium 140 135 - 145 mmol/L    Potassium 4.7 3.4 - 5.3 mmol/L    Carbon Dioxide (CO2) 28 22 - 29 mmol/L    Anion Gap 10 7 - 15 mmol/L    Urea Nitrogen 17.1 8.0 - 23.0 mg/dL    Creatinine 1.10 0.67 - 1.17 mg/dL    GFR Estimate 77 >60 mL/min/1.73m2    Calcium 9.3 8.8 - 10.4 mg/dL    Chloride 102 98 - 107 mmol/L    Glucose 116 (H) 70 - 99 mg/dL    Alkaline Phosphatase 66 40 - 150 U/L    AST 23 0 - 45 U/L    ALT 18 0 - 70 U/L    Protein Total 7.3 6.4 - 8.3 g/dL    Albumin 4.2 3.5 - 5.2 g/dL    Bilirubin Total 0.3 <=1.2 mg/dL   Partial thromboplastin time   Result Value Ref Range    aPTT 25 22 - 38 Seconds   INR   Result Value Ref Range    INR 0.96 0.85 - 1.15   CBC with platelets and differential   Result Value Ref Range    WBC Count 11.1 (H) 4.0 - 11.0 10e3/uL    RBC Count 4.59 4.40 - 5.90 10e6/uL    Hemoglobin 14.6 13.3 - 17.7 g/dL    Hematocrit 42.3 40.0 - 53.0 %    MCV 92 78 - 100 fL    MCH 31.8 26.5 - 33.0 pg    MCHC 34.5 31.5 - 36.5 g/dL    RDW  11.9 10.0 - 15.0 %    Platelet Count 222 150 - 450 10e3/uL    % Neutrophils 90 %    % Lymphocytes 7 %    % Monocytes 3 %    % Eosinophils 0 %    % Basophils 0 %    % Immature Granulocytes 0 %    NRBCs per 100 WBC 0 <1 /100    Absolute Neutrophils 10.0 (H) 1.6 - 8.3 10e3/uL    Absolute Lymphocytes 0.7 (L) 0.8 - 5.3 10e3/uL    Absolute Monocytes 0.3 0.0 - 1.3 10e3/uL    Absolute Eosinophils 0.0 0.0 - 0.7 10e3/uL    Absolute Basophils 0.0 0.0 - 0.2 10e3/uL    Absolute Immature Granulocytes 0.0 <=0.4 10e3/uL    Absolute NRBCs 0.0 10e3/uL          EKG Interpretation:      Interpreted by Mohamud Stone MD  Time reviewed: Upon completion  Symptoms at time of EKG: Left calf pain and swelling, acute DVT, history of atrial fibrillation   Rhythm: Normal sinus   Rate: Normal  Axis: Normal  Ectopy: None  Conduction: Superior P wave axis, short NM interval  ST Segments/ T Waves: No acute appearing ST-T wave changes, No acute ischemic changes, Non-specific ST-T wave changes  Q Waves: None  Clinical Impression: Sinus rhythm, no atrial fibrillation, no acute appearing or significant EKG changes.      Medications - No data to display    I reviewed the risks and benefits of anticoagulation therapy in the setting of his new diagnosis of acute left leg DVT and through shared decision making he elected to initiate DOAC therapy and I will initiate Eliquis.  He understands that this is complicated by his history of thrombocytopenia. We discussed and he understand the risks and benefits of DOAC therapy versus Coumadin/warfarin therapy, including the benefit of reversibility of Coumadin/warfarin therapy. He understands and accept this.  We discussed the need for deferral of all NSAID while on anticoagulation therapy and he expressed understanding of this.  *He denies contraindications to anticoagulation therapy.  We discussed signs and symptoms of bleeding and symptoms to watch for and which would indicate need for emergent reevaluation  should they occur.     Assessments & Plan (with Medical Decision Making)   3 days of left calf pain and swelling of insidious onset and no known no known precipitant following medial left thigh tenderness a short time prior to this and since resolved.  Left lower extremity ultrasound evaluation showed occlusive DVT extending from the proximal left superficial femoral vein into the popliteal vein with becomes nonocclusive in the left posterior tibial and peroneal veins. In addition, there is occlusive DVT in the left deep femoral vein.  No evidence of PE or other complication of this process.  EKG shows normal sinus rhythm laboratory evaluation was unremarkable.  After discussion of the risks and benefits and contraindications, through shared decision making we elected anticoagulation with Eliquis.  I prescribed the first month of Eliquis for DVT and made a primary care referral and Hematology clinic referral for follow-up.  He understands he needs to follow-up in clinic as directed and that he will need to be continued on Eliquis for the next several months, possibly longer based on his consultation results with Hematology. He  was provided instructions for supportive care and will return as needed for worsened condition or worsening symptoms, or new problems or concerns.      I have reviewed the nursing notes.    I have reviewed the findings, diagnosis, plan and need for follow up with the patient.    Medical Decision Making: Moderate complexity        Discharge Medication List as of 9/12/2024  3:26 PM        START taking these medications    Details   Apixaban Starter Pack (ELIQUIS DVT/PE STARTER PACK) 5 MG TBPK Take 10 mg by mouth 2 times daily for 7 days, THEN 5 mg 2 times daily for 23 days., Disp-1 each, R-0, E-Prescribe             Final diagnoses:   Acute deep vein thrombosis (DVT) of left lower extremity, unspecified vein (H)       9/12/2024   [facility] EMERGENCY DEPT       Mohamud Stone,  MD  09/13/24 0020

## 2024-09-12 NOTE — TELEPHONE ENCOUNTER
Called and advised patient.  He stated that he did get the message and will go now to the ER.    Patient stated understanding and agreeable with the plan of care.     Nayely BARRY RN  Triage Nurse  [facility]

## 2024-09-12 NOTE — ED TRIAGE NOTES
Patient reports Monday he developed left calf swelling and tenderness. Patient called pcp and they directed him to ED for an ultrasound.      Triage Assessment (Adult)       Row Name 09/12/24 1245          Triage Assessment    Airway WDL WDL        Respiratory WDL    Respiratory WDL WDL        Skin Circulation/Temperature WDL    Skin Circulation/Temperature WDL WDL        Cardiac WDL    Cardiac WDL WDL        Peripheral/Neurovascular WDL    Peripheral Neurovascular WDL WDL        Cognitive/Neuro/Behavioral WDL    Cognitive/Neuro/Behavioral WDL WDL

## 2024-09-12 NOTE — TELEPHONE ENCOUNTER
"Nurse Triage SBAR    Routed to PCP to see if ok to wait for OV today with PCP at 2:40, or go to ER  Is this a 2nd Level Triage? YES, LICENSED PRACTITIONER REVIEW IS REQUIRED    Situation: patient has an appointment with PCP today for left lower calf swelling, pain, and tightness since Sunday    Patient stated that he has lower left calf area, a little bit into knee area, since Sunday.  He stated that when his is sitting or laying down the pain in minimal, but walking it is a 9/10.  The calf is also very tight    Background:     Patient has a history of Afib and is not on any blood thinners    Assessment:     1. ONSET: \"When did the swelling start?\" (e.g., minutes, hours, days)      Sunday  2. LOCATION: \"What part of the leg is swollen?\"  \"Are both legs swollen or just one leg?\"      Left lower half  3. SEVERITY: \"How bad is the swelling?\" (e.g., localized; mild, moderate, severe)    - Localized: Small area of swelling localized to one leg.    - MILD pedal edema: Swelling limited to foot and ankle, pitting edema < 1/4 inch (6 mm) deep, rest and elevation eliminate most or all swelling.    - MODERATE edema: Swelling of lower leg to knee, pitting edema > 1/4 inch (6 mm) deep, rest and elevation only partially reduce swelling.    - SEVERE edema: Swelling extends above knee, facial or hand swelling present.       Isolated to the calf area, and a little bit to knee  4. REDNESS: \"Does the swelling look red or infected?\"      denies  5. PAIN: \"Is the swelling painful to touch?\" If Yes, ask: \"How painful is it?\"   (Scale 1-10; mild, moderate or severe)      Yes, walking it is a 9/10  When laying if it better  6. FEVER: \"Do you have a fever?\" If Yes, ask: \"What is it, how was it measured, and when did it start?\"       denies  7. CAUSE: \"What do you think is causing the leg swelling?\"      Not sure  8. MEDICAL HISTORY: \"Do you have a history of blood clots (e.g., DVT), cancer, heart failure, kidney disease, or liver " "failure?\"      A fib. Not on blood thinners  9. RECURRENT SYMPTOM: \"Have you had leg swelling before?\" If Yes, ask: \"When was the last time?\" \"What happened that time?\"      denies  10. OTHER SYMPTOMS: \"Do you have any other symptoms?\" (e.g., chest pain, difficulty breathing)        Denies, except skin is very tight    Protocol Recommended Disposition:   To clinic now/GO to ER    Recommendation: Routed to PCP to see if ok to wait for OV or go to ER    Routed to PCP to advise    Routed to provider    Nayely BARRY RN  Triage Nurse  [facility]      "

## 2024-09-12 NOTE — TELEPHONE ENCOUNTER
"Asked to triage patient. Patient is on PCPs schedule for today at 2:40.    Swollen and painful lower left calf.   Reason for Disposition   Thigh or calf pain and only 1 side and present > 1 hour   Thigh, calf, or ankle swelling in only one leg    Additional Information   Negative: Sounds like a life-threatening emergency to the triager   Negative: Chest pain   Negative: Followed an insect bite and has localized swelling (e.g., small area of puffy or swollen skin)   Negative: Followed a knee injury   Negative: Ankle or foot injury   Negative: Pregnant with leg swelling or edema   Negative: Difficulty breathing at rest   Negative: Entire foot is cool or blue in comparison to other side   Negative: SEVERE swelling (e.g., swelling extends above knee, entire leg is swollen, weeping fluid)   Negative: Cast on leg or ankle and has increasing pain   Negative: Can't walk or can barely stand (new-onset)   Negative: Fever and red area (or area very tender to touch)   Negative: Swelling of face, arm or hands  (Exception: Slight puffiness of fingers during hot weather.)   Negative: Pregnant 20 or more weeks and sudden weight gain (i.e., > 2 lbs, 1 kg in one week)    Answer Assessment - Initial Assessment Questions  1. ONSET: \"When did the swelling start?\" (e.g., minutes, hours, days)      Sunday  2. LOCATION: \"What part of the leg is swollen?\"  \"Are both legs swollen or just one leg?\"      Left lower falf  3. SEVERITY: \"How bad is the swelling?\" (e.g., localized; mild, moderate, severe)    - Localized: Small area of swelling localized to one leg.    - MILD pedal edema: Swelling limited to foot and ankle, pitting edema < 1/4 inch (6 mm) deep, rest and elevation eliminate most or all swelling.    - MODERATE edema: Swelling of lower leg to knee, pitting edema > 1/4 inch (6 mm) deep, rest and elevation only partially reduce swelling.    - SEVERE edema: Swelling extends above knee, facial or hand swelling present.       Isolated to " "the calf area, and a little bit to knee  4. REDNESS: \"Does the swelling look red or infected?\"      denies  5. PAIN: \"Is the swelling painful to touch?\" If Yes, ask: \"How painful is it?\"   (Scale 1-10; mild, moderate or severe)      Yes, walking it is a 9/10  When laying if it better  6. FEVER: \"Do you have a fever?\" If Yes, ask: \"What is it, how was it measured, and when did it start?\"       denies  7. CAUSE: \"What do you think is causing the leg swelling?\"      Not sure  8. MEDICAL HISTORY: \"Do you have a history of blood clots (e.g., DVT), cancer, heart failure, kidney disease, or liver failure?\"      A fib. Not on blood thinners  9. RECURRENT SYMPTOM: \"Have you had leg swelling before?\" If Yes, ask: \"When was the last time?\" \"What happened that time?\"      denies  10. OTHER SYMPTOMS: \"Do you have any other symptoms?\" (e.g., chest pain, difficulty breathing)        Denies, except skin is very tight    Protocols used: Leg Swelling and Edema-A-OH    "

## 2024-09-13 ENCOUNTER — PATIENT OUTREACH (OUTPATIENT)
Dept: FAMILY MEDICINE | Facility: CLINIC | Age: 59
End: 2024-09-13
Payer: COMMERCIAL

## 2024-09-13 ENCOUNTER — PATIENT OUTREACH (OUTPATIENT)
Dept: ONCOLOGY | Facility: CLINIC | Age: 59
End: 2024-09-13
Payer: COMMERCIAL

## 2024-09-13 DIAGNOSIS — M10.9 ACUTE GOUTY ARTHRITIS: ICD-10-CM

## 2024-09-13 NOTE — TELEPHONE ENCOUNTER
Assessments & Plan (with Medical Decision Making)   3 days of left calf pain and swelling of insidious onset and no known no known precipitant following medial left thigh tenderness a short time prior to this and since resolved.  Left lower extremity ultrasound evaluation showed occlusive DVT extending from the proximal left superficial femoral vein into the popliteal vein with becomes nonocclusive in the left posterior tibial and peroneal veins. In addition, there is occlusive DVT in the left deep femoral vein.  No evidence of PE or other complication of this process.  EKG shows normal sinus rhythm laboratory evaluation was unremarkable.  After discussion of the risks and benefits and contraindications, through shared decision making we elected anticoagulation with Eliquis.  I prescribed the first month of Eliquis for DVT and made a primary care referral and Hematology clinic referral for follow-up.  He understands he needs to follow-up in clinic as directed and that he will need to be continued on Eliquis for the next several months, possibly longer based on his consultation results with Hematology. He  was provided instructions for supportive care and will return as needed for worsened condition or worsening symptoms, or new problems or concerns.

## 2024-09-13 NOTE — TELEPHONE ENCOUNTER
Medication Question or Refill    Contacts       Contact Date/Time Type Contact Phone/Fax    09/13/2024 05:05 PM CDT Phone (Incoming) Mason Jacinto (Self) [phone] (M)            What medication are you calling about (include dose and sig)?: HYDROcodone-acetaminophen (NORCO) 5-325 MG tablet    Preferred Pharmacy:    [id number] [address]  [facility] & [address] [place] [address]  Phone: [phone] Fax: [phone]      Controlled Substance Agreement on file:   CSA -- Patient Level:    CSA: None found at the patient level.       Who prescribed the medication?: Leah Stringer MD     Do you need a refill? Yes    When did you use the medication last? 9/13/24    Patient offered an appointment? No    Do you have any questions or concerns?  No      Could we send this information to you in [facility] or would you prefer to receive a phone call?:   Patient would prefer a phone call   Okay to leave a detailed message?: Yes at Cell number on file:    Telephone Information:   Mobile [phone]         Mike

## 2024-09-13 NOTE — PROGRESS NOTES
New Patient Oncology Nurse Navigator Note     Referral Received: 09/13/24      Referring provider: Mohamud Stone MD     Referring Clinic/Organization: [facility]     Referred to: Benign Hematology    Requested provider (if applicable): First available - did not specify      Evaluation for :   Diagnosis   I82.402 (ICD-10-CM) - Acute deep vein thrombosis (DVT) of left lower extremity, unspecified vein (H)      Clinical History (per Nurse review of records provided):      VENOUS ULTRASOUND LEFT LEG  9/12/2024 1:40 PM      HISTORY: left calf pain and swelling since Monday     COMPARISON: None.     FINDINGS:  Examination of the deep veins with graded compression and  color flow Doppler with spectral wave form analysis was performed.   There is occlusive DVT extending from the proximal left superficial  femoral vein into the popliteal vein. This becomes nonocclusive in the  left posterior tibial and peroneal veins. In addition, there is  occlusive DVT in the left deep femoral vein.                                                                      IMPRESSION: Left lower extremity DVT as above.    ED note 9/12:   Assessments & Plan (with Medical Decision Making)   3 days of left calf pain and swelling of insidious onset and no known no known precipitant following medial left thigh tenderness a short time prior to this and since resolved.  Left lower extremity ultrasound evaluation showed occlusive DVT extending from the proximal left superficial femoral vein into the popliteal vein with becomes nonocclusive in the left posterior tibial and peroneal veins. In addition, there is occlusive DVT in the left deep femoral vein.  No evidence of PE or other complication of this process.  EKG shows normal sinus rhythm laboratory evaluation was unremarkable.  After discussion of the risks and benefits and contraindications, through shared decision making we elected anticoagulation with Eliquis.  I prescribed the first  month of Eliquis for DVT and made a primary care referral and Hematology clinic referral for follow-up.  He understands he needs to follow-up in clinic as directed and that he will need to be continued on Eliquis for the next several months, possibly longer based on his consultation results with Hematology. He  was provided instructions for supportive care and will return as needed for worsened condition or worsening symptoms, or new problems or concerns.    Records Location: [facility]     Records Needed:     N /A    Additional testing needed prior to consult:     ?    Referral updates and Plan:     09/13/2024 3:23 PM -  Referral received and reviewed. Called pt at this time, introduced my role as nurse navigator with [facility] Hematology/Oncology dept and that we have recd the referral for dx of DVT from Dr Stone  Pt confirms they are aware of the referral and ready to schedule  Provided contact information if future questions arise.     -Transferred pt to NPS line [phone] to schedule appt per scheduling instructions.    ANGELA GutierrezN, RN  Hematology/Oncology Nurse Navigator  [facility]  [phone] / [phone]

## 2024-09-13 NOTE — TELEPHONE ENCOUNTER
RN left message to return call to clinic [phone].  (RN did not leave specific details on voicemail for confidential reasons)    Amanda Thakkar RN on 9/13/2024 at 1:38 PM

## 2024-09-16 RX ORDER — HYDROCODONE BITARTRATE AND ACETAMINOPHEN 5; 325 MG/1; MG/1
1 TABLET ORAL EVERY 6 HOURS PRN
Qty: 60 TABLET | Refills: 0 | Status: SHIPPED | OUTPATIENT
Start: 2024-09-16

## 2024-10-02 ENCOUNTER — OFFICE VISIT (OUTPATIENT)
Dept: FAMILY MEDICINE | Facility: CLINIC | Age: 59
End: 2024-10-02
Payer: COMMERCIAL

## 2024-10-02 VITALS
OXYGEN SATURATION: 100 % | BODY MASS INDEX: 25.46 KG/M2 | WEIGHT: 192.13 LBS | SYSTOLIC BLOOD PRESSURE: 116 MMHG | RESPIRATION RATE: 20 BRPM | HEART RATE: 84 BPM | TEMPERATURE: 97.7 F | DIASTOLIC BLOOD PRESSURE: 70 MMHG | HEIGHT: 73 IN

## 2024-10-02 DIAGNOSIS — I82.412 ACUTE DEEP VEIN THROMBOSIS (DVT) OF FEMORAL VEIN OF LEFT LOWER EXTREMITY (H): Primary | ICD-10-CM

## 2024-10-02 PROCEDURE — 99214 OFFICE O/P EST MOD 30 MIN: CPT | Performed by: FAMILY MEDICINE

## 2024-10-02 ASSESSMENT — PAIN SCALES - GENERAL: PAINLEVEL: MODERATE PAIN (5)

## 2024-10-02 NOTE — PROGRESS NOTES
Assessment & Plan     (I82.412) Acute deep vein thrombosis (DVT) of femoral vein of left lower extremity (H)  (primary encounter diagnosis)  Comment: Unprovoked DVT 2 weeks ago.  No pulmonary involvement.  First known DVT.  He notes a family history of his mother having a pulmonary embolism.  Unclear of the details.  He notes decreased swelling and pain of his left calf.  Denies any shortness of breath, hemoptysis, chest pain.  Patient is current with routine cancer screenings.  Denies constitutional symptoms, abdominal pain.  Plan: apixaban ANTICOAGULANT (ELIQUIS) 5 MG tablet,         US Lower Extremity Venous Duplex Left, Adult         Oncology/Hematology  Referral       Given the unprovoked nature of his DVT, will we will continue DOAC therapy.  Ultrasound ordered for 3 months to see if this clot persists.  Also referral placed to hematology.  Reviewed that unsure how long he will have to be on anticoagulation therapy.  Since it does appear that he has a family history of unprovoked DVTs, they may extend therapy.    Patient Instructions   Sorry you have a blood clot.     Full exercise when the leg pain goes away.     At your next refill, will send in for the blood thinner: Eliquis. 5 mg twice per day.     In early December, schedule an ultrasound of your leg to see if the clot has gone away. To schedule, use [email or web address] or call [phone].      See the hematologist about a week after your ultrasound. I'll place a referral, check with your provider network.          MED REC REQUIRED  Post Medication Reconciliation Status:     Nicotine/Tobacco Cessation  He reports that he has been smoking dip, chew, snus or snuff. He has never been exposed to tobacco smoke. His smokeless tobacco use includes chew.  Nicotine/Tobacco Cessation Plan  Information offered: Patient not interested at this time            Jessica Cuevas is a 58 year old, presenting for the following health issues:  Hospital F/U      10/2/2024  "    9:39 AM   Additional Questions   Roomed by Jordyn Carvalho CMA   Accompanied by None         10/2/2024     9:39 AM   Patient Reported Additional Medications   Patient reports taking the following new medications None     HPI       ED/UC Followup:    Facility:  [facility]  Date of visit: 09/12/2024  Reason for visit: Acute deep vein thrombosis (DVT) of left lower extremity, unspecified vein (H)   Current Status: Patient states that it is still the same. Not as sore as it first was. At the end of the day starts to get sore, tight, and swollen.         Review of Systems  CONSTITUTIONAL: NEGATIVE for fever, chills, change in weight  ENT/MOUTH: NEGATIVE for ear, mouth and throat problems  RESP: NEGATIVE for significant cough or SOB  CV: NEGATIVE for chest pain, palpitations or peripheral edema  MUSCULOSKELETAL: Left calf pain, decreasing in intensity.  HEME/ALLERGY/IMMUNE: NEGATIVE for bleeding problems      Objective    /70   Pulse 84   Temp 97.7  F (36.5  C) (Temporal)   Resp 20   Ht 1.842 m (6' 0.5\")   Wt 87.1 kg (192 lb 2 oz)   SpO2 100%   BMI 25.70 kg/m    Body mass index is 25.7 kg/m .  Physical Exam   GENERAL: Healthy, alert and no distress  EYES: Eyes grossly normal to inspection, conjunctivae and sclerae normal  RESP: Lungs clear to auscultation - no rales, rhonchi or wheezes  CV: Regular rate and rhythm, normal S1 S2, no murmur  MS: Left calf circumference approximately 2 cm greater than right.  Distal pulses intact in the lower extremities, sensitive to light touch in the lower extremities.  NEURO: Normal strength and tone, mentation intact and speech normal  PSYCH: Mentation appears normal, affect normal/bright             Signed Electronically by: Leha Stringer MD    "

## 2024-10-02 NOTE — PATIENT INSTRUCTIONS
Sorry you have a blood clot.     Full exercise when the leg pain goes away.     At your next refill, will send in for the blood thinner: Eliquis. 5 mg twice per day.     In early December, schedule an ultrasound of your leg to see if the clot has gone away. To schedule, use [facility] or call [phone].      See the hematologist about a week after your ultrasound. I'll place a referral, check with your provider network.

## 2024-12-02 ENCOUNTER — HOSPITAL ENCOUNTER (OUTPATIENT)
Dept: ULTRASOUND IMAGING | Facility: CLINIC | Age: 59
Discharge: HOME | End: 2024-12-02
Attending: FAMILY MEDICINE | Admitting: FAMILY MEDICINE
Payer: COMMERCIAL

## 2024-12-02 DIAGNOSIS — I82.412 ACUTE DEEP VEIN THROMBOSIS (DVT) OF FEMORAL VEIN OF LEFT LOWER EXTREMITY (H): ICD-10-CM

## 2024-12-02 PROCEDURE — 93971 EXTREMITY STUDY: CPT | Mod: LT

## 2024-12-24 NOTE — TELEPHONE ENCOUNTER
RECORDS STATUS - ALL OTHER DIAGNOSIS      RECORDS RECEIVED FROM: [facility] - Internal records   DATE RECEIVED: 12/24      
no

## 2025-01-02 NOTE — TELEPHONE ENCOUNTER
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 Farrell-Osteopath Surgery   MEDICATION LIST Internal    PROCEDURES     ENDOSCOPY  Internal 7/22/24  6/30/22  10/3/19   COLONOSCOPY Internal 5/16/22 8/11/14   STOOL TESTING     LABS     PERTINENT LABS Internal    PATHOLOGY REPORTS (RELATED) Internal EGD 7/22/24, 6/30/22, 10/3/19  Colonoscopy 5/16/22, 8/11/14   IMAGES     CT Internal 5/5/23-CT abd pel

## 2025-01-07 ENCOUNTER — ONCOLOGY VISIT (OUTPATIENT)
Dept: ONCOLOGY | Facility: CLINIC | Age: 60
End: 2025-01-07
Attending: FAMILY MEDICINE
Payer: COMMERCIAL

## 2025-01-07 ENCOUNTER — PRE VISIT (OUTPATIENT)
Dept: ONCOLOGY | Facility: CLINIC | Age: 60
End: 2025-01-07
Payer: COMMERCIAL

## 2025-01-07 VITALS
RESPIRATION RATE: 16 BRPM | HEART RATE: 98 BPM | SYSTOLIC BLOOD PRESSURE: 134 MMHG | OXYGEN SATURATION: 97 % | TEMPERATURE: 98.8 F | HEIGHT: 72 IN | WEIGHT: 192 LBS | DIASTOLIC BLOOD PRESSURE: 84 MMHG | BODY MASS INDEX: 26.01 KG/M2

## 2025-01-07 DIAGNOSIS — I82.412 ACUTE DEEP VEIN THROMBOSIS (DVT) OF FEMORAL VEIN OF LEFT LOWER EXTREMITY (H): ICD-10-CM

## 2025-01-07 PROCEDURE — 99203 OFFICE O/P NEW LOW 30 MIN: CPT | Performed by: INTERNAL MEDICINE

## 2025-01-07 PROCEDURE — G0463 HOSPITAL OUTPT CLINIC VISIT: HCPCS | Performed by: INTERNAL MEDICINE

## 2025-01-07 PROCEDURE — G2211 COMPLEX E/M VISIT ADD ON: HCPCS | Performed by: INTERNAL MEDICINE

## 2025-01-07 ASSESSMENT — PAIN SCALES - GENERAL: PAINLEVEL_OUTOF10: NO PAIN (0)

## 2025-01-07 NOTE — PROGRESS NOTES
"Oncology Rooming Note    January 7, 2025 3:02 PM   Favio Jacinto is a 59 year old male who presents for:    Chief Complaint   Patient presents with    Hematology     ADVT - New consult     Initial Vitals: /84 (BP Location: Right arm, Patient Position: Sitting, Cuff Size: Adult Regular)   Pulse 98   Temp 98.8  F (37.1  C) (Tympanic)   Resp 16   Ht 1.816 m (5' 11.5\")   Wt 87.1 kg (192 lb)   SpO2 97%   BMI 26.41 kg/m   Estimated body mass index is 26.41 kg/m  as calculated from the following:    Height as of this encounter: 1.816 m (5' 11.5\").    Weight as of this encounter: 87.1 kg (192 lb). Body surface area is 2.1 meters squared.  No Pain (0) Comment: Data Unavailable   No LMP for male patient.  Allergies reviewed: Yes  Medications reviewed: Yes    Medications: Medication refills not needed today.  Pharmacy name entered into for; to (do):    [facility] - [place], [place] - [address] [id number] - [place], [place] - [address]  [address] [facility]    Frailty Screening:   Is the patient here for a new oncology consult visit in cancer care? 2. No      Clinical concerns:  New consult      Tori Mueller CMA            "

## 2025-01-07 NOTE — PROGRESS NOTES
[facility] Hematology and Oncology Consult Note    Patient: Favio Jacinto  MRN: [id number]  Date of Service: 01/07/2025      Reason for Visit    No chief complaint on file.        Assessment/Plan    Problem List Items Addressed This Visit    None  Visit Diagnoses       Acute deep vein thrombosis (DVT) of femoral vein of left lower extremity (H)              Left lower extremity DVT  Atrial fibrillation  I have reviewed his chart extensively.  Presented with left lower extremity swelling and pain.  Ultrasound showed deep vein thrombosis involving left superficial femoral vein through popliteal vein.  Seemingly unprovoked.  No obvious risk factors noted.  No personal history of any autoimmune disorders.  No family history of any blood clots.  He does have A-fib history.  He also has bicuspid aortic valve.  I reviewed management of unprovoked proximal lower extremity VTE with him in detail today.  For unprovoked VTE who do not have any risk factors for bleeding, the current recommendation is to continue lifelong anticoagulation.  I reviewed the rationale behind this.  With his history of A-fib  on top of history of DVT, I think he would qualify for lifelong anticoagulation.  Currently on Eliquis and doing well.  Recommend continuing 5 mg twice daily indefinitely as long as his pitting risk is low.  No need for any inherited thrombophilia workup since it will not change our management.  Concern for APLS is pretty low.  So no workup from my side at this point in time.  No need for further hematology follow-ups.    ECOG Performance    0 - Independent    Problem List    Patient Active Problem List   Diagnosis    Bicuspid aortic valve    Aortic stenosis    GERD (gastroesophageal reflux disease)    Ex-smoker    CARDIOVASCULAR SCREENING; LDL GOAL LESS THAN 160    Gout, chronic    Polyp of colon, hyperplastic    Atrial fibrillation (H)      ______________________________________________________________________________    Staging History    Cancer Staging   No matching staging information was found for the patient.        History of presenting illness:  Favio Jacinto is a 59-year-old male with recent diagnosis of acute left lower extremity DVT currently on anticoagulation w eliazar Eliquis here to discuss further management of the same.    He presented to the ER on  with Left calf pain and swelling started a few days prior to that.  Ultrasound showed deep vein thrombosis involving the proximal left superficial femoral vein through popliteal vein.  No obvious provoking factors noted.  He was started on Eliquis.  He had no evidence of PE.  He does have a history of A-fib and bicuspid aortic valve but he was not on any anticoagulation prior to that.  No personal history of any autoimmune disorders.  Does use chewing tobacco.  Significant alcohol use.  No family history of any blood clots. His last colonoscopy was in May 2022.  Had a polyp removed.  He also had an upper GI endoscopy in July of last year which showed eosinophilic esophagitis.      Past History    Past Medical History:   Diagnosis Date    Sinusitis     Family History   Problem Relation Age of Onset    Cancer Father          at 60, smoker lung    Cancer Brother         testicular cancer, doing well. Skin cancer    Other Cancer Brother         Liver and lung    Respiratory Mother         asthma-uses Advair      Past Surgical History:   Procedure Laterality Date    COLONOSCOPY N/A 2022    Procedure: COLONOSCOPY, WITH POLYPECTOMY AND BIOPSY;  Surgeon: Parker Washington MD;  Location: [facility]    ESOPHAGOSCOPY, GASTROSCOPY, DUODENOSCOPY (EGD), COMBINED N/A 10/3/2019    Procedure: ESOPHAGOGASTRODUODENOSCOPY, WITH BIOPSY;  Surgeon: Favio Devries MD;  Location: [facility]    ESOPHAGOSCOPY, GASTROSCOPY, DUODENOSCOPY (EGD), COMBINED N/A 2022    Procedure: ESOPHAGOGASTRODUODENOSCOPY,  WITH BIOPSY;  Surgeon: Parker Washington MD;  Location: [facility]    ESOPHAGOSCOPY, GASTROSCOPY, DUODENOSCOPY (EGD), COMBINED N/A 2024    Procedure: ESOPHAGOGASTRODUODENOSCOPY, WITH BIOPSY;  Surgeon: Jhony Farrell DO;  Location: [facility]    HERNIA REPAIR, INGUINAL RT/LT      ZZHC ARTHROTOMY W/OPEN MENISCUS REPAIR      Social History     Socioeconomic History    Marital status:      Spouse name: Not on file    Number of children: Not on file    Years of education: Not on file    Highest education level: Not on file   Occupational History    Not on file   Tobacco Use    Smoking status: Every Day     Types: Dip, chew, snus or snuff     Last attempt to quit: 2007     Years since quittin.0     Passive exposure: Never    Smokeless tobacco: Current     Types: Chew   Vaping Use    Vaping status: Never Used   Substance and Sexual Activity    Alcohol use: Yes     Comment: occasional    Drug use: No    Sexual activity: Yes     Partners: Female   Other Topics Concern    Parent/sibling w/ CABG, MI or angioplasty before 65F 55M? Not Asked   Social History Narrative    Not on file     Social Drivers of Health     Financial Resource Strain: Low Risk  (2024)    Financial Resource Strain     Within the past 12 months, have you or your family members you live with been unable to get utilities (heat, electricity) when it was really needed?: No   Food Insecurity: Low Risk  (2024)    Food Insecurity     Within the past 12 months, did you worry that your food would run out before you got money to buy more?: No     Within the past 12 months, did the food you bought just not last and you didn t have money to get more?: No   Transportation Needs: Low Risk  (2024)    Transportation Needs     Within the past 12 months, has lack of transportation kept you from medical appointments, getting your medicines, non-medical meetings or appointments, work, or from getting things that you need?: No   Physical  Activity: Sufficiently Active (6/16/2024)    Exercise Vital Sign     Days of Exercise per Week: 3 days     Minutes of Exercise per Session: 60 min   Stress: No Stress Concern Present (6/16/2024)    [employer] - Occupational Stress Questionnaire     Feeling of Stress : Not at all   Social Connections: Unknown (6/16/2024)    Social Connection and Isolation Panel [NHANES]     Frequency of Communication with Friends and Family: Not on file     Frequency of Social Gatherings with Friends and Family: Once a week     Attends Zoroastrianism Services: Not on file     Active Member of Clubs or Organizations: Not on file     Attends Club or Organization Meetings: Not on file     Marital Status: Not on file   Interpersonal Safety: Low Risk  (6/21/2024)    Interpersonal Safety     Do you feel physically and emotionally safe where you currently live?: Yes     Within the past 12 months, have you been hit, slapped, kicked or otherwise physically hurt by someone?: No     Within the past 12 months, have you been humiliated or emotionally abused in other ways by your partner or ex-partner?: No   Housing Stability: Low Risk  (6/16/2024)    Housing Stability     Do you have housing? : Yes     Are you worried about losing your housing?: No        Allergies    Allergies   Allergen Reactions    Pcn [Penicillins]        Review of Systems    Pertinent items are noted in HPI.      Physical Exam        10/2/2024     9:42 AM   Oncology Vitals   Height 184 cm   Weight 87.147 kg   BSA (m2) 2.11 m2   /70   Pulse 84   Temp 97.7  F (36.5  C)   Temp src Temporal   SpO2 100 %   Pain Score 5 (Moderate)   Pain Loc LOWER LEG       General: alert and cooperative  HEENT: Head: Normal, normocephalic, atraumatic.  Eye: Normal external eye, conjunctiva, lids cornea, JAIME.  Chest: Clear to auscultation bilaterally  Cardiac: S1, S2 normal, regular rate and rhythm  Abdomen: abdomen is soft without significant tenderness, masses,  organomegaly or guarding  Extremities: atraumatic, no peripheral edema  Skin:   CNS: Alert and oriented x3, neurologic exam grossly normal.  Lymphatics: No bilateral cervical, axillary, supraclavicular or inguinal adenopathy noted      Lab Results    No results found for this or any previous visit (from the past week).    Imaging Results    No results found.    A total of *** minutes was spent today on this visit including face to face conversation with the patient, EMR review (labs, imaging studies, pathology reports and outside records), counseling and care co-ordination and documentation.    Signed by: Cha Hussein MD

## 2025-01-07 NOTE — Clinical Note
1/7/2025      Favio Jacinto  [address] [facility] [address]      Dear Colleague,    Thank you for referring your patient, Favio Jacinto, to the [facility] [place]. Please see a copy of my visit note below.    [facility] Hematology and Oncology Consult Note    Patient: Favio Jacinto  MRN: [id number]  Date of Service: 01/07/2025      Reason for Visit    No chief complaint on file.        Assessment/Plan    Problem List Items Addressed This Visit    None  Visit Diagnoses       Acute deep vein thrombosis (DVT) of femoral vein of left lower extremity (H)              Left lower extremity DVT  Atrial fibrillation  I have reviewed his chart extensively.  Presented with left lower extremity swelling and pain.  Ultrasound showed deep vein thrombosis involving left superficial femoral vein through popliteal vein.  Seemingly unprovoked.  No obvious risk factors noted.  No personal history of any autoimmune disorders.  No family history of any blood clots.  He does have A-fib history.  He also has bicuspid aortic valve.  I reviewed management of unprovoked proximal lower extremity VTE with him in detail today.  For unprovoked VTE who do not have any risk factors for bleeding, the current recommendation is to continue lifelong anticoagulation.  I reviewed the rationale behind this.  With his history of A-fib  on top of history of DVT, I think he would qualify for lifelong anticoagulation.  Currently on Eliquis and doing well.  Recommend continuing 5 mg twice daily indefinitely as long as his pitting risk is low.  No need for any inherited thrombophilia workup since it will not change our management.  Concern for APLS is pretty low.  So no workup from my side at this point in time.  No need for further hematology follow-ups.    ECOG Performance    0 - Independent    Problem List    Patient Active Problem List   Diagnosis     Bicuspid aortic valve     Aortic stenosis     GERD  (gastroesophageal reflux disease)     Ex-smoker     CARDIOVASCULAR SCREENING; LDL GOAL LESS THAN 160     Gout, chronic     Polyp of colon, hyperplastic     Atrial fibrillation (H)     ______________________________________________________________________________    Staging History    Cancer Staging   No matching staging information was found for the patient.        History of presenting illness:  Faivo Jacinto is a 59-year-old male with recent diagnosis of acute left lower extremity DVT currently on anticoagulation w ith Eliquis here to discuss further management of the same.    He presented to the ER on  with Left calf pain and swelling started a few days prior to that.  Ultrasound showed deep vein thrombosis involving the proximal left superficial femoral vein through popliteal vein.  No obvious provoking factors noted.  He was started on Eliquis.  He had no evidence of PE.  He does have a history of A-fib and bicuspid aortic valve but he was not on any anticoagulation prior to that.  No personal history of any autoimmune disorders.  Does use chewing tobacco.  Significant alcohol use.  No family history of any blood clots. His last colonoscopy was in May 2022.  Had a polyp removed.  He also had an upper GI endoscopy in July of last year which showed eosinophilic esophagitis.      Past History    Past Medical History:   Diagnosis Date     Sinusitis     Family History   Problem Relation Age of Onset     Cancer Father          at 60, smoker lung     Cancer Brother         testicular cancer, doing well. Skin cancer     Other Cancer Brother         Liver and lung     Respiratory Mother         asthma-uses Advair      Past Surgical History:   Procedure Laterality Date     COLONOSCOPY N/A 2022    Procedure: COLONOSCOPY, WITH POLYPECTOMY AND BIOPSY;  Surgeon: Parker Washington MD;  Location: [facility]     ESOPHAGOSCOPY, GASTROSCOPY, DUODENOSCOPY (EGD), COMBINED N/A 10/3/2019    Procedure:  ESOPHAGOGASTRODUODENOSCOPY, WITH BIOPSY;  Surgeon: Favio Devries MD;  Location: [facility]     ESOPHAGOSCOPY, GASTROSCOPY, DUODENOSCOPY (EGD), COMBINED N/A 2022    Procedure: ESOPHAGOGASTRODUODENOSCOPY, WITH BIOPSY;  Surgeon: Parker Washington MD;  Location: [facility]     ESOPHAGOSCOPY, GASTROSCOPY, DUODENOSCOPY (EGD), COMBINED N/A 2024    Procedure: ESOPHAGOGASTRODUODENOSCOPY, WITH BIOPSY;  Surgeon: Jhony Farrell DO;  Location: [facility]     HERNIA REPAIR, INGUINAL RT/LT       ZZHC ARTHROTOMY W/OPEN MENISCUS REPAIR      Social History     Socioeconomic History     Marital status:      Spouse name: Not on file     Number of children: Not on file     Years of education: Not on file     Highest education level: Not on file   Occupational History     Not on file   Tobacco Use     Smoking status: Every Day     Types: Dip, chew, snus or snuff     Last attempt to quit: 2007     Years since quittin.0     Passive exposure: Never     Smokeless tobacco: Current     Types: Chew   Vaping Use     Vaping status: Never Used   Substance and Sexual Activity     Alcohol use: Yes     Comment: occasional     Drug use: No     Sexual activity: Yes     Partners: Female   Other Topics Concern     Parent/sibling w/ CABG, MI or angioplasty before 65F 55M? Not Asked   Social History Narrative     Not on file     Social Drivers of Health     Financial Resource Strain: Low Risk  (2024)    Financial Resource Strain      Within the past 12 months, have you or your family members you live with been unable to get utilities (heat, electricity) when it was really needed?: No   Food Insecurity: Low Risk  (2024)    Food Insecurity      Within the past 12 months, did you worry that your food would run out before you got money to buy more?: No      Within the past 12 months, did the food you bought just not last and you didn t have money to get more?: No   Transportation Needs: Low Risk  (2024)    Transportation Needs       Within the past 12 months, has lack of transportation kept you from medical appointments, getting your medicines, non-medical meetings or appointments, work, or from getting things that you need?: No   Physical Activity: Sufficiently Active (6/16/2024)    Exercise Vital Sign      Days of Exercise per Week: 3 days      Minutes of Exercise per Session: 60 min   Stress: No Stress Concern Present (6/16/2024)    [employer] - Occupational Stress Questionnaire      Feeling of Stress : Not at all   Social Connections: Unknown (6/16/2024)    Social Connection and Isolation Panel [NHANES]      Frequency of Communication with Friends and Family: Not on file      Frequency of Social Gatherings with Friends and Family: Once a week      Attends Scientology Services: Not on file      Active Member of Clubs or Organizations: Not on file      Attends Club or Organization Meetings: Not on file      Marital Status: Not on file   Interpersonal Safety: Low Risk  (6/21/2024)    Interpersonal Safety      Do you feel physically and emotionally safe where you currently live?: Yes      Within the past 12 months, have you been hit, slapped, kicked or otherwise physically hurt by someone?: No      Within the past 12 months, have you been humiliated or emotionally abused in other ways by your partner or ex-partner?: No   Housing Stability: Low Risk  (6/16/2024)    Housing Stability      Do you have housing? : Yes      Are you worried about losing your housing?: No        Allergies    Allergies   Allergen Reactions     Pcn [Penicillins]        Review of Systems    Pertinent items are noted in HPI.      Physical Exam        10/2/2024     9:42 AM   Oncology Vitals   Height 184 cm   Weight 87.147 kg   BSA (m2) 2.11 m2   /70   Pulse 84   Temp 97.7  F (36.5  C)   Temp src Temporal   SpO2 100 %   Pain Score 5 (Moderate)   Pain Loc LOWER LEG       General: alert and cooperative  HEENT: Head: Normal, normocephalic,  "atraumatic.  Eye: Normal external eye, conjunctiva, lids cornea, JAIME.  Chest: Clear to auscultation bilaterally  Cardiac: S1, S2 normal, regular rate and rhythm  Abdomen: abdomen is soft without significant tenderness, masses, organomegaly or guarding  Extremities: atraumatic, no peripheral edema  Skin:   CNS: Alert and oriented x3, neurologic exam grossly normal.  Lymphatics: No bilateral cervical, axillary, supraclavicular or inguinal adenopathy noted      Lab Results    No results found for this or any previous visit (from the past week).    Imaging Results    No results found.    A total of *** minutes was spent today on this visit including face to face conversation with the patient, EMR review (labs, imaging studies, pathology reports and outside records), counseling and care co-ordination and documentation.    Signed by: Cha Hussein MD      Oncology Rooming Note    January 7, 2025 3:02 PM   Favio Jacinto is a 59 year old male who presents for:    Chief Complaint   Patient presents with    Hematology     ADVT - New consult     Initial Vitals: /84 (BP Location: Right arm, Patient Position: Sitting, Cuff Size: Adult Regular)   Pulse 98   Temp 98.8  F (37.1  C) (Tympanic)   Resp 16   Ht 1.816 m (5' 11.5\")   Wt 87.1 kg (192 lb)   SpO2 97%   BMI 26.41 kg/m   Estimated body mass index is 26.41 kg/m  as calculated from the following:    Height as of this encounter: 1.816 m (5' 11.5\").    Weight as of this encounter: 87.1 kg (192 lb). Body surface area is 2.1 meters squared.  No Pain (0) Comment: Data Unavailable   No LMP for male patient.  Allergies reviewed: Yes  Medications reviewed: Yes    Medications: Medication refills not needed today.  Pharmacy name entered into AboutMy[facility]:    [facility] - [place], [place] - [address] [id number] - [place] [id number] [facility]  AT [facility]    Frailty Screening:   Is the patient here for a " new oncology consult visit in cancer care? 2. No      Clinical concerns:  New consult      Tori Mueller CMA                Again, thank you for allowing me to participate in the care of your patient.        Sincerely,        Cha Hussein MD    Electronically signed

## 2025-01-08 DIAGNOSIS — I82.412 ACUTE DEEP VEIN THROMBOSIS (DVT) OF FEMORAL VEIN OF LEFT LOWER EXTREMITY (H): ICD-10-CM

## 2025-01-21 DIAGNOSIS — M10.9 ACUTE GOUTY ARTHRITIS: ICD-10-CM

## 2025-01-21 RX ORDER — HYDROCODONE BITARTRATE AND ACETAMINOPHEN 5; 325 MG/1; MG/1
1 TABLET ORAL EVERY 6 HOURS PRN
Qty: 60 TABLET | Refills: 0 | Status: SHIPPED | OUTPATIENT
Start: 2025-01-21

## 2025-02-04 ENCOUNTER — PRE VISIT (OUTPATIENT)
Dept: GASTROENTEROLOGY | Facility: CLINIC | Age: 60
End: 2025-02-04

## 2025-02-04 ENCOUNTER — VIRTUAL VISIT (OUTPATIENT)
Dept: GASTROENTEROLOGY | Facility: CLINIC | Age: 60
End: 2025-02-04
Payer: COMMERCIAL

## 2025-02-04 DIAGNOSIS — K22.89 MUCOSAL ABNORMALITY OF ESOPHAGUS: Primary | ICD-10-CM

## 2025-02-04 DIAGNOSIS — Z72.0 CHEWING TOBACCO USE: ICD-10-CM

## 2025-02-04 PROCEDURE — 98002 SYNCH AUDIO-VIDEO NEW MOD 45: CPT | Performed by: INTERNAL MEDICINE

## 2025-02-04 NOTE — PATIENT INSTRUCTIONS
It was a pleasure taking care of you today.  I've included a brief summary of our discussion and care plan from today's visit below.  Please review this information with your primary care provider.        Please schedule an endoscopy at your convenience with me (Dr Nesbitt) to evaluate for eosinophilic esophagitis. You do not need to start the omeprazole at this time.   It is possible you have EoE or quite possibly reflux despite no symptoms of heartburn/regurgitation      If you feel you received exceptional care and are interested in supporting the clinical and research goals of Dr. Nesbitt or the Division of Gastroenterology, Hepatology, and Nutrition please contact him directly through [employer]  to discuss opportunities to donate.    Please call my nurse Nettie ([phone]) or Tori ([phone]) with any questions or concerns.     Sincerely,    Wilton Nesbitt DO   of Medicine  Director, Esophageal Disorders Program  Director of Endoscopy, [facility]  Division of Gastroenterology, Hepatology, and Nutrition  [facility]      Please see below for any additional questions and scheduling guidelines.    Sign up for [employer]: [employer] patient portal serves as a secure platform for accessing your medical records from the [facility]. Additionally, [employer] facilitates easy, timely, and secure messaging with your care team. If you have not signed up, you may do so by using the provided code or calling [phone].    Coordinating your care after your visit:  There are multiple options for scheduling your follow-up care based on your provider's recommendation.    How do I schedule a follow-up clinic appointment:   After your appointment, you may receive scheduling assistance with the Clinic Coordinators by having a seat in the waiting room and a Clinic Coordinator will call you up to schedule.  Virtual visits or after you leave the clinic:  Your provider  has placed a follow-up order in the [employer] portal for scheduling your return appointment. A member of the scheduling team will contact you to schedule.  [facility] Scheduling: Timely scheduling through [employer] is advised to ensure appointment availability.   Call to schedule: You may schedule your follow-up appointment(s) by calling [phone], option 1.    How do I schedule my endoscopy or colonoscopy procedure:  If a procedure, such as a colonoscopy or upper endoscopy was ordered by your provider, the scheduling team will contact you to schedule this procedure. Or you may choose to call to schedule at   [phone], option 2.  Please allow 20-30 minutes when scheduling a procedure.    How do I get my blood work done? To get your blood work done, you need to schedule a lab appointment at an [facility]. There are multiple ways to schedule:   At the clinic: The Clinic Coordinator you meet after your visit can help you schedule a lab appointment.   [facility] scheduling: [facility] offers online lab scheduling at all [facility] laboratory locations.   Call to schedule: You can call [phone] to schedule your lab appointment.    How do I schedule my imaging study: To schedule imaging studies, such as CT scans, ultrasounds, MRIs, or X-rays, contact Imaging Services at [phone].    How do I schedule a referral to another doctor: If your provider recommended a referral to another specialist(s), the referral order was placed by your provider. You will receive a phone call to schedule this referral, or you may choose to call the number attached to the referral to self-schedule.    For Post-Visit Question(s):  For any inquiries following today's visit:  Please utilize [employer] messaging and allow 48 hours for reply or contact the Call Center during normal business hours at [phone], option 3.  For Emergent After-hours questions, contact the On-Call GI Fellow through the [facility]   at [phone].  In addition, you may contact your Nurse directly using the provided contact information.    Test Results:  Test results will be accessible via [employer] in compliance with the 21st Century Cures Act. This means that your results will be available to you at the same time as your provider. Often you may see your results before your provider does. Results are reviewed by staff within two weeks with communication follow-up. Results may be released in the patient portal prior to your care team review.    Prescription Refill(s):  Medication prescribed by your provider will be addressed during your visit. For future refills, please coordinate with your pharmacy. If you have not had a recent clinic visit or routine labs, for your safety, your provider may not be able to refill your prescription.

## 2025-02-04 NOTE — NURSING NOTE
Current patient location: [address] [facility] [address]    Is the patient currently in the state of [place]? YES    Visit mode: VIDEO    If the visit is dropped, the patient can be reconnected by:VIDEO VISIT: Text to cell phone:   Telephone Information:   Mobile [phone]       Will anyone else be joining the visit? YES: How would they like to receive their invitation? Send to e-mail: Wife will be with  (If patient encounters technical issues they should call [phone])    Are changes needed to the allergy or medication list? No    Are refills needed on medications prescribed by this physician? NO    Rooming Documentation:  Questionnaire(s) completed    Reason for visit: Consult    Kamron RUFFF

## 2025-02-04 NOTE — LETTER
2/4/2025      Favio Jacinto  [address] [facility] [address]      Dear Colleague,    Thank you for referring your patient, Favio Jacinto, to the [facility]STRO[facility]OLOG[facility] [place]. Please see a copy of my visit note below.    Virtual Visit Details    Type of service:  Video Visit     Originating Location (pt. Location): Home    Distant Location (provider location):  On-site  Platform used for Video Visit: [facility]    Gastroenterology Visit for: Favio Jacinto 1965   MRN: [id number]     Reason for Visit:  chief complaint    Referred by: Myke  / [id number] [facility] / [facility] [id number]  Patient Care Team:  Leah Stringer MD as PCP - General  Leah Stringer MD as Assigned PCP  Ousmane Murray MD as MD (Dermatology)  Jony Lara MD as Assigned Surgical Provider  Leah Stringer MD as Assigned Pain Medication Provider  Wilton Nesbitt DO as Physician (Gastroenterology)  Cha Hussein MD as MD (Medical Oncology)  Cha Hussein MD as MD (Medical Oncology)    History of Present Illness:   Favio Jacinto is a 59 year old male with afib, DVT, aortic stenosis, chewing tobacco use who is presenting as a new patient in consultation at the request of Dr. Stringer with a chief complaint of eosinophils on endoscopy.  ---------------------------------------------------------------  2.4.25  Favio Jacinto states it has been three months since seeing his doctor. He uses chewing tobacco. Denies any symptoms of acid reflux. Not taking medication for reflux currently. Has been prescribed omeprazole and wasn't planning on taking the medication. No dysphagia to solids or liquids. Denies heartburn and regurgitation.     See updated BEDQ and Eckardt score below: These patient reported outcomes are pertinent to the HPI and have personally been reviewed.    ---------------------------------------------------------------     Wt Readings from Last 5  Encounters:   01/07/25 87.1 kg (192 lb)   10/02/24 87.1 kg (192 lb 2 oz)   09/12/24 83.9 kg (185 lb)   07/22/24 85.3 kg (188 lb)   06/21/24 85.3 kg (188 lb)        Esophageal Questionnaire(s)    BEDQ Questionnaire      2/3/2025     9:58 AM   BEDQ Questionnaire: How Often Have You Had the Following?   Trouble eating solid food (meat, bread, vegetables) 0   Trouble eating soft foods (yogurt, jello, pudding) 0   Trouble swallowing liquids 0   Pain while swallowing 0   Coughing or choking while swallowing foods or liquids 0   Total Score: 0        Patient-reported         2/3/2025     9:58 AM   BEDQ Questionnaire: Discomfort/Pain Ratings   Eating solid food (meat, bread, vegetables) 0   Eating soft foods (yogurt, jello, pudding) 0   Drinking liquid 0   Total Score: 0        Patient-reported       Eckardt Questionnaire      2/3/2025     9:59 AM   Eckardt Questionnaire   Dysphagia 0   Regurgitation 0   Retrosternal Pain 0   Weight Loss (kg) 0   Total Score:  0        Patient-reported       Promis 10 Questionnaire      2/3/2025    10:02 AM   PROMIS 10 FLOWSHEET DATA   In general, would you say your health is: 4   In general, would you say your quality of life is: 4   In general, how would you rate your physical health? 4   In general, how would you rate your mental health, including your mood and your ability to think? 4   In general, how would you rate your satisfaction with your social activities and relationships? 4   In general, please rate how well you carry out your usual social activities and roles. (This includes activities at home, at work and in your community, and responsibilities as a parent, child, spouse, employee, friend, etc.) 4   To what extent are you able to carry out your everyday physical activities such as walking, climbing stairs, carrying groceries, or moving a chair? 5   In the past 7 days, how often have you been bothered by emotional problems such as feeling anxious, depressed, or irritable? 2    In the past 7 days, how would you rate your fatigue on average? 1   In the past 7 days, how would you rate your pain on average, where 0 means no pain, and 10 means worst imaginable pain? 0   Mental health question re-calculation - no clinical value 4    Physical health question re-calculation - no clinical value 5    Pain question re-calculation - no clinical value 5    Global Mental Health Score 16    Global Physical Health Score 19    PROMIS TOTAL - SUBSCORES 35        Patient-reported           STUDIES & PROCEDURES:    EGD:   Date: 7/22/24  Impression:  The examined duodenum was normal.        The stomach was normal.        The gastroesophageal flap valve was visualized endoscopically and        classified as Hill Grade II (fold present, opens with respiration).        Mucosal changes including longitudinal furrows, small-caliber esophagus        and congestion (edema) were found in the lower third of the esophagus.        Esophageal findings were graded using the Eosinophilic Esophagitis        Endoscopic Reference Score (EoE-EREFS) as: Edema Grade 1 Present        (decreased clarity or absence of vascular markings), Rings Grade 1 Mild        (subtle circumferential ridges seen on esophageal distension), Exudates        Grade 1 Mild (scattered white lesions involving less than 10 percent of        the esophageal surface area), Furrows Grade 1 Mild (vertical lines        without visible depth) and Stricture none (no stricture found). Biopsies        were taken with a cold forceps for histology.        Biopsies were taken with a cold forceps at the gastroesophageal junction        for histology.        Patchy mild mucosal variance characterized by granularity was found at        the cricopharyngeus. Biopsies were taken with a cold forceps for        histology.                                                                                    Impression:            - Normal examined duodenum.                           - Normal stomach.                          - Gastroesophageal flap valve classified as Hill Grade                          II (fold present, opens with respiration).                          - Esophageal mucosal changes secondary to eosinophilic                          esophagitis. Biopsied.                          - Esophageal mucosal variant. Biopsied.                          - Biopsies were taken with a cold forceps for                          histology at the gastroesophageal junction.   Pathology Report:  A(1). Stomach, biopsy:  - Antral and oxyntic type gastric mucosa with no significant pathological changes.  - Negative for H. Pylori organisms on routine stains.  - Negative for intestinal metaplasia.   -Negative for dysplasia or malignancy.        B(2).  Gastroesophageal junction, biopsy:  -Squamous mucosa with prominent intraepithelial eosinophils and prominent reactive epithelial changes.  -Approximately 70 eosinophils per high-power field identified in maximal focus.  -Adjacent gastric type columnar mucosa with chronic inflammation and reactive epithelial changes  -Negative for intestinal metaplasia.  -Negative for dysplasia or malignancy        C(3).  Esophagus, distal, biopsy:  -Squamous mucosa with prominent intraepithelial eosinophils and prominent reactive epithelial changes.  -Approximately 70 eosinophils per high-power field identified in maximal focus.  -Negative for intestinal metaplasia.  -Negative for dysplasia or malignancy     D(4). Esophagus, proximal, biopsy:  -Oxyntic type gastric mucosa with chronic inflammation and adjacent squamous mucosa suggestive of inlet patch.  -Negative for intestinal metaplasia  -Negative for dysplasia or malignancy        EGD  6/30/22  The Z-line was irregular and was found 37 cm from the incisors. Biopsies        were taken with a cold forceps for histology. Verification of patient        identification for the specimen was done by the physician, nurse  and        technician using the patient's name, birth date and medical record        number. Estimated blood loss was minimal.        The entire examined stomach was normal.        The ampulla, duodenal bulb, first portion of the duodenum and second        portion of the duodenum were normal.                                                                                    Impression:            - Z-line irregular, 37 cm from the incisors. Biopsied.                          - Normal stomach.                          - Normal ampulla, duodenal bulb, first portion of the                          duodenum and second portion of the duodenum.   Path  Gastroesophageal junction: Biopsy:  - Squamous mucosa with basal cell hyperplasia and increased intraepithelial eosinophils, focally greater than 100 eosinophils/HPF, see comment  - No columnar epithelium seen      EGD  10/3/2019   The nasopharynx and oropharynx were normal.        Localized mild mucosal changes characterized by granularity were found        in the upper third of the esophagus. Biopsies were taken with a cold        forceps for histology.        The exam of the esophagus was otherwise normal.        The entire examined stomach was normal.        There is no endoscopic evidence of erythema or inflammatory changes        suggestive of gastritis or ulceration in the stomach.        The examined duodenum was normal.                                                                                    Impression:          - Normal nasopharynx and oropharynx.                        - Granular mucosa in the esophagus. Biopsied.                        - Normal stomach.                        - Normal examined duodenum.   Path  Esophageal biopsy, upper:   - Gastroesophageal junction with chronic esophagitis with eosinophilia   (see comment).   - Negative for Seaman's esophagus, dysplasia or malignancy.     Colonoscopy:  Date:  Impression:  Pathology  Report:     EndoFLIP directed at the UES or LES (8cm (EF-325) balloon length or 16cm (EF-322) balloon length):   Date:  8cm balloon  Balloon inflation Balloon pressure CSA (mm^2) DI (mm^2/mmHg) Dmin (mm) Compliance   20 (ladmark ID)        30        40        50           16cm balloon  Balloon inflation Balloon pressure CSA (mm^2) DI (mm^2/mmHg) Dmin (mm) Compliance   30 (ladmark ID)        40        50        60        70           High Resolution Manometry:  Date:  Impression:    PH/Impedance:  Date:  Impression:     Bravo:  48 or 96hr  Date:  Impression:    CT:  Date:  Impression:    Esophagram:  Date:  Impression:    Modified Barium Esophagram:  Date:  Impression:     Prior medical records were reviewed including, but not limited to, notes from referring providers, lab work, radiographic tests, and other diagnostic tests. Pertinent results were summarized above.     History     Past Medical History:   Diagnosis Date     Sinusitis        Past Surgical History:   Procedure Laterality Date     COLONOSCOPY N/A 5/16/2022    Procedure: COLONOSCOPY, WITH POLYPECTOMY AND BIOPSY;  Surgeon: Parker Washington MD;  Location: [facility]     ESOPHAGOSCOPY, GASTROSCOPY, DUODENOSCOPY (EGD), COMBINED N/A 10/3/2019    Procedure: ESOPHAGOGASTRODUODENOSCOPY, WITH BIOPSY;  Surgeon: Favio Devries MD;  Location: [facility]     ESOPHAGOSCOPY, GASTROSCOPY, DUODENOSCOPY (EGD), COMBINED N/A 6/30/2022    Procedure: ESOPHAGOGASTRODUODENOSCOPY, WITH BIOPSY;  Surgeon: Parker Washington MD;  Location: [facility]     ESOPHAGOSCOPY, GASTROSCOPY, DUODENOSCOPY (EGD), COMBINED N/A 7/22/2024    Procedure: ESOPHAGOGASTRODUODENOSCOPY, WITH BIOPSY;  Surgeon: Jhony Farrell DO;  Location: [facility]     HERNIA REPAIR, INGUINAL RT/LT       ZZHC ARTHROTOMY W/OPEN MENISCUS REPAIR         Social History     Socioeconomic History     Marital status:      Spouse name: Not on file     Number of children: Not on file     Years of education: Not on file     Highest  education level: Not on file   Occupational History     Not on file   Tobacco Use     Smoking status: Every Day     Types: Dip, chew, snus or snuff     Last attempt to quit: 2007     Years since quittin.1     Passive exposure: Never     Smokeless tobacco: Current     Types: Chew   Vaping Use     Vaping status: Never Used   Substance and Sexual Activity     Alcohol use: Yes     Comment: occasional     Drug use: No     Sexual activity: Yes     Partners: Female   Other Topics Concern     Parent/sibling w/ CABG, MI or angioplasty before 65F 55M? Not Asked   Social History Narrative     Not on file     Social Drivers of Health     Financial Resource Strain: Low Risk  (2024)    Financial Resource Strain      Within the past 12 months, have you or your family members you live with been unable to get utilities (heat, electricity) when it was really needed?: No   Food Insecurity: Low Risk  (2024)    Food Insecurity      Within the past 12 months, did you worry that your food would run out before you got money to buy more?: No      Within the past 12 months, did the food you bought just not last and you didn t have money to get more?: No   Transportation Needs: Low Risk  (2024)    Transportation Needs      Within the past 12 months, has lack of transportation kept you from medical appointments, getting your medicines, non-medical meetings or appointments, work, or from getting things that you need?: No   Physical Activity: Sufficiently Active (2024)    Exercise Vital Sign      Days of Exercise per Week: 3 days      Minutes of Exercise per Session: 60 min   Stress: No Stress Concern Present (2024)    [employer] - Occupational Stress Questionnaire      Feeling of Stress : Not at all   Social Connections: Unknown (2024)    Social Connection and Isolation Panel [NHANES]      Frequency of Communication with Friends and Family: Not on file      Frequency of Social  Gatherings with Friends and Family: Once a week      Attends [employer] Services: Not on file      Active Member of Clubs or Organizations: Not on file      Attends Club or Organization Meetings: Not on file      Marital Status: Not on file   Interpersonal Safety: Low Risk  (2024)    Interpersonal Safety      Do you feel physically and emotionally safe where you currently live?: Yes      Within the past 12 months, have you been hit, slapped, kicked or otherwise physically hurt by someone?: No      Within the past 12 months, have you been humiliated or emotionally abused in other ways by your partner or ex-partner?: No   Housing Stability: Low Risk  (2024)    Housing Stability      Do you have housing? : Yes      Are you worried about losing your housing?: No       Family History   Problem Relation Age of Onset     Cancer Father          at 60, smoker lung     Cancer Brother         testicular cancer, doing well. Skin cancer     Other Cancer Brother         Liver and lung     Respiratory Mother         asthma-uses Advair     Family history reviewed and edited as appropriate    Medications and Allergies:     Outpatient Encounter Medications as of 2025   Medication Sig Dispense Refill     allopurinol (ZYLOPRIM) 300 MG tablet Take 1 tablet (300 mg) by mouth daily To prevent gout. 90 tablet 3     apixaban ANTICOAGULANT (ELIQUIS) 5 MG tablet Take 1 tablet (5 mg) by mouth 2 times daily. 180 tablet 1     HYDROcodone-acetaminophen (NORCO) 5-325 MG tablet Take 1 tablet by mouth every 6 hours as needed for severe pain. 60 tablet 0     Facility-Administered Encounter Medications as of 2025   Medication Dose Route Frequency Provider Last Rate Last Admin     iopamidol (ISOVUE-370) 76% solution 5-200 mL  5-200 mL Intravenous Q5 Min PRN Leonardo Valentine MD   105 mL at 14 1005     lidocaine (cardiac) (XYLOCAINE) injection 100 mg  100 mg Intravenous Once PRN Leonardo Valentine MD         metoprolol  (LOPRESSOR) injection 5-15 mg  5-15 mg Intravenous Q3 Min PRN Leonardo Valentine MD   10 mg at 07/08/14 0949     metoprolol (LOPRESSOR) tablet  mg   mg Oral Once PRN Leonardo Valentine MD         nitroglycerin (NITROSTAT) SL tablet 0.4 mg  0.4 mg Sublingual Q15 Min PRN Leonardo Valentine MD   0.4 mg at 07/08/14 0950     sodium chloride (PF) 0.9% PF flush 5-10 mL  5-10 mL Intravenous Q5 Min PRN Leonardo Valentine MD         sodium chloride 0.9 % BOLUS 100 mL  100 mL Intravenous Q5 Min PRN Leonardo Valentine MD   100 mL at 07/08/14 1006     sodium chloride bacteriostatic 0.9 % flush 0-1 mL  0-1 mL Intradermal Once PRN Leonardo Valentine MD            Allergies   Allergen Reactions     Pcn [Penicillins]         Review of systems:  A full 10 point review of systems was obtained and was negative except for the pertinent positives and negatives stated within the HPI.    Objective Findings:   Physical Exam:    Constitutional: There were no vitals taken for this visit.  General: Alert, cooperative, no distress, well-appearing  Head: Atraumatic, normocephalic, no obvious abnormalities   Eyes: EOMI, Sclera anicteric, no obvious conjunctival hemorrhage   Nose: Nares normal, no obvious malformation, no obvious rhinorrhea   Respiratory: normal appearing no cough  Musculoskeletal: Range of motion intact, no obvious strength deficit  Skin: No jaundice, no obvious rash  Neurologic: AAOx3, no obvious neurologic abnormality  Psychiatric: Normal Affect, appropriate mood  Extremities: No obvious edema, no obvious malformation     Labs, Radiology, Pathology     Lab Results   Component Value Date    WBC 11.1 (H) 09/12/2024    WBC 6.9 09/25/2019    WBC 5.6 11/29/2011    HGB 14.6 09/12/2024    HGB 15.1 09/25/2019    HGB 15.8 08/12/2014     09/12/2024     09/25/2019     08/12/2014    CHOL 190 06/21/2024    CHOL 186 05/05/2023    CHOL 202 (H) 03/18/2022    TRIG 168 (H) 06/21/2024    TRIG 140  05/05/2023    TRIG 86 03/18/2022    HDL 53 06/21/2024    HDL 54 05/05/2023    HDL 58 03/18/2022    ALT 18 09/12/2024    ALT 27 06/21/2024    ALT 27 03/18/2022    AST 23 09/12/2024    AST 28 06/21/2024    AST 19 03/18/2022     09/12/2024     06/21/2024     03/18/2022    BUN 17.1 09/12/2024    BUN 13.6 06/21/2024    BUN 17 03/18/2022    CO2 28 09/12/2024    CO2 25 06/21/2024    CO2 25 03/18/2022    TSH 3.11 06/21/2024    TSH 3.38 05/05/2023    TSH 2.0 08/12/2014    INR 0.96 09/12/2024    INR 0.9 08/13/2014    INR 0.97 04/12/2010        Liver Function Studies -   Recent Labs   Lab Test 09/12/24  1403   PROTTOTAL 7.3   ALBUMIN 4.2   BILITOTAL 0.3   ALKPHOS 66   AST 23   ALT 18          Patient Active Problem List    Diagnosis Date Noted     Mucosal abnormality of esophagus 02/04/2025     Priority: Medium     Chewing tobacco use 02/04/2025     Priority: Medium     Atrial fibrillation (H) 10/31/2018     Priority: Medium     Polyp of colon, hyperplastic 08/20/2014     Priority: Medium     Gout, chronic 08/04/2011     Priority: Medium     February 24, 2013 no recent flairs, normal renal function, uric acid 8.3, not on prophylactic treatment at this time. Hydration, avoid high protein foods, Monitor.   July 31, 2013 recent flair. He does not seem to respond to NSAIDs but does respond well to a prednisone burst. Would recommend this first line with gouty flairs.        CARDIOVASCULAR SCREENING; LDL GOAL LESS THAN 160 10/31/2010     Priority: Medium     Ex-smoker 05/11/2010     Priority: Medium     Bicuspid aortic valve 04/29/2010     Priority: Medium     Echo 4/2010       Aortic stenosis 04/29/2010     Priority: Medium     February 15, 2013 Mild echo 4/2010, repeat echo next year.            GERD (gastroesophageal reflux disease) 04/29/2010     Priority: Medium     Gastroscopy 2004        Assessment and Plan   Assessment:    Favio Jacinto is a 59 year old male with afib, DVT, aortic stenosis, chewing  tobacco use who is presenting as a new patient in consultation at the request of Dr. Stringer with a chief complaint of eosinophils on endoscopy.    The patient has had several endoscopies with eosinophils at the EGJ and distal esophagus. There are also endoscopic changes that are consistent with eoe. That being said the patient has no clinical symptoms consistent with eoe and he does not currently have any reflux symptoms.     I discussed eoe in detail and the findings of his endoscopy and pathology. It is possible he has eoe and/or reflux. We will plan to pursue endoscopy at the patient's convenience with biopsy of the distal mid and proximal esophagus to help determine if he has underlying eoe.     Future therapy will be based on the upcoming endoscopy.     Discussed discontinuing chewing tobacco.    1. Mucosal abnormality of esophagus    2. Chewing tobacco use       Orders Placed This Encounter   Procedures     Adult GI  Referral - Procedure Only      Plan:    Please schedule an endoscopy at your convenience with me (Dr Nesbitt) to evaluate for eosinophilic esophagitis. You do not need to start the omeprazole at this time.   It is possible you have EoE or quite possibly reflux despite no symptoms of heartburn/regurgitation    Follow up plan:   Return to clinic 1 year and as needed.    The risks and benefits of my recommendations, as well as other treatment options were discussed with the patient and any available family today. All questions were answered.     Follow up: As planned above. Today, I personally spent 25 minutes in direct face to face time with the patient, of which greater than 50% of the time was spent in patient education and counseling as described above. Approximately 20 minutes were spent on indirect care associated with the patient's consultation including but not limited to review of: patient medical records to date, clinic visits, hospital records, lab results, imaging studies,  procedural documentation, and coordinating care with other providers. The findings from this review are summarized in the above note. All of the above accounted for a cumulative time of 45 minutes and was performed on the date of service.     The patient verbalized understanding of the plan and was appreciative for the time spent and information provided during the office visit.     Author:   Wilton Nesbitt DO   of Medicine  Director, Esophageal Disorders Program  Director of Endoscopy, [facility]  Division of Gastroenterology, Hepatology, and Nutrition  [facility]    Dr. Nesbitt speaks for [employer] regarding dupilumab and has participated in advisory boards for the medication. He receives income for these activities. When discussing the medication, patients were informed of Dr. Nesbitt's role and potential conflict of interest. All questions and/or concerns were answered during the encounter.    Dr. Nesbitt speaks for [employer] regarding vonoprazan. He receives income for these activities. When discussing the medication, patients were informed of Dr. Nesbitt's role and potential conflict of interest. All questions and/or concerns were answered during the encounter.     Documentation assisted by voice recognition and documentation system.      Again, thank you for allowing me to participate in the care of your patient.        Sincerely,        Wilton Nesbitt DO    Electronically signed

## 2025-05-22 ENCOUNTER — PATIENT OUTREACH (OUTPATIENT)
Dept: CARE COORDINATION | Facility: CLINIC | Age: 60
End: 2025-05-22
Payer: COMMERCIAL

## 2025-05-28 DIAGNOSIS — M10.9 ACUTE GOUTY ARTHRITIS: ICD-10-CM

## 2025-05-28 RX ORDER — HYDROCODONE BITARTRATE AND ACETAMINOPHEN 5; 325 MG/1; MG/1
1 TABLET ORAL EVERY 6 HOURS PRN
Qty: 30 TABLET | Refills: 0 | Status: SHIPPED | OUTPATIENT
Start: 2025-05-28

## 2025-05-28 NOTE — TELEPHONE ENCOUNTER
Pt calling requesting refill of hydrocodone.     Pt has appt set up for 7/29 with PCP.     Med and pharmacy pended for provider review.     Sri Pinto RN

## 2025-05-29 NOTE — TELEPHONE ENCOUNTER
Called [phone] (home)   Did they answer the phone: No, left a message on voicemail to return call to the [facility] at [phone], and to ask for any available triage nurse.  (RN did not leave specific details on voicemail for confidential reasons)      Nayely BARRY RN  Triage Nurse  [facility]

## 2025-06-16 ENCOUNTER — TELEPHONE (OUTPATIENT)
Dept: GASTROENTEROLOGY | Facility: CLINIC | Age: 60
End: 2025-06-16
Payer: COMMERCIAL

## 2025-06-16 NOTE — TELEPHONE ENCOUNTER
"Endoscopy Scheduling Screen    Caller: patient    Have you had any respiratory illness or flu-like symptoms in the last 10 days?  No    What is your communication preference for Instructions and/or Bowel Prep?   Siddharthat    What insurance is in the chart?  Other:  [facility]    Ordering/Referring Provider:   HAIDER PAREDES    (If ordering provider performs procedure, schedule with ordering provider unless otherwise instructed. )    BMI: Estimated body mass index is 26.41 kg/m  as calculated from the following:    Height as of 1/7/25: 1.816 m (5' 11.5\").    Weight as of 1/7/25: 87.1 kg (192 lb).     Sedation Ordered  MAC/deep sedation.   BMI<= 45 45 < BMI <= 48 48 < BMI < = 50  BMI > 50   No Restrictions No [facility]  No [facility]  [facility] with exceptions Hospital Only OR Only       Do you have a history of malignant hyperthermia?  No    (Females) Are you currently pregnant?   No     Have you been diagnosed or told you have pulmonary hypertension?   No    Do you have an LVAD?  No    Have you been told you have moderate to severe sleep apnea?  No.    Have you been told you have COPD, asthma, or any other lung disease?  No    Has your doctor ordered any cardiac tests like echo, angiogram, stress test, ablation, or EKG, that you have not completed yet?  No    Do you  have a history of any heart conditions?  Yes     Have you had any hospitalizations  in the last year for heart related issues, for example a stent placement, heart attack, or cardiomyopathy?  No    Do you have any implantable devices in your body (pacemaker, ICD)?  Other valve    Do you take nitroglycerine?  No    Have you ever had or are you waiting for an organ transplant?  No. Continue scheduling, no site restrictions.    Have you had a stroke or transient ischemic attack (TIA aka \"mini stroke\") in the last 2 years?   No.    Have you been diagnosed with or been told you have cirrhosis of the liver?   No.    Are you currently on dialysis?   No    Do you need " "assistance transferring?   No    BMI: Estimated body mass index is 26.41 kg/m  as calculated from the following:    Height as of 1/7/25: 1.816 m (5' 11.5\").    Weight as of 1/7/25: 87.1 kg (192 lb).     Is patients BMI > 40 and scheduling location [facility]?  No    Do you take an injectable or oral medication for weight loss or diabetes (excluding insulin)?  No    Do you take the medication Naltrexone?  No    Do you take blood thinners?  Yes, you must contact your prescribing provider for directions on holding or bridging with a different medication.       Prep   Are you currently on dialysis or do you have chronic kidney disease?  No    Do you have a diagnosis of diabetes?  No    Do you have a diagnosis of cystic fibrosis (CF)?  No    On a regular basis do you go 3 -5 days between bowel movements?  No    BMI > 40?  No    Preferred Pharmacy:    [facility] #[id number] - [place] [address]  [facility] & [address] [place] [address]  Phone: [phone] Fax: [phone]      Final Scheduling Details     Procedure scheduled  Upper endoscopy (EGD)    Surgeon:  Laz     Date of procedure:  9/3     Pre-OP / PAC:   No - Not required for this site.    Location  [facility] - ASC - Patient preference.    Sedation   MAC/Deep Sedation - Per order.      Patient Reminders:   You will receive a call from a Nurse to review instructions and health history.  This assessment must be completed prior to your procedure.  Failure to complete the Nurse assessment may result in the procedure being cancelled.      On the day of your procedure, please designate an adult(s) who can drive you home stay with you for the next 24 hours. The medicines used in the exam will make you sleepy. You will not be able to drive.      You cannot take public transportation, ride share services, or non-medical taxi service without a responsible caregiver.  Medical transport services are allowed with the " requirement that a responsible caregiver will receive you at your destination.  We require that drivers and caregivers are confirmed prior to your procedure.

## 2025-07-27 SDOH — HEALTH STABILITY: PHYSICAL HEALTH: ON AVERAGE, HOW MANY DAYS PER WEEK DO YOU ENGAGE IN MODERATE TO STRENUOUS EXERCISE (LIKE A BRISK WALK)?: 3 DAYS

## 2025-07-27 SDOH — HEALTH STABILITY: PHYSICAL HEALTH: ON AVERAGE, HOW MANY MINUTES DO YOU ENGAGE IN EXERCISE AT THIS LEVEL?: 60 MIN

## 2025-07-27 ASSESSMENT — SOCIAL DETERMINANTS OF HEALTH (SDOH): HOW OFTEN DO YOU GET TOGETHER WITH FRIENDS OR RELATIVES?: TWICE A WEEK

## 2025-07-29 ENCOUNTER — OFFICE VISIT (OUTPATIENT)
Dept: FAMILY MEDICINE | Facility: CLINIC | Age: 60
End: 2025-07-29
Payer: COMMERCIAL

## 2025-07-29 VITALS
DIASTOLIC BLOOD PRESSURE: 80 MMHG | WEIGHT: 187 LBS | HEIGHT: 72 IN | BODY MASS INDEX: 25.33 KG/M2 | TEMPERATURE: 97 F | OXYGEN SATURATION: 100 % | RESPIRATION RATE: 20 BRPM | SYSTOLIC BLOOD PRESSURE: 130 MMHG | HEART RATE: 68 BPM

## 2025-07-29 DIAGNOSIS — I82.412 ACUTE DEEP VEIN THROMBOSIS (DVT) OF FEMORAL VEIN OF LEFT LOWER EXTREMITY (H): ICD-10-CM

## 2025-07-29 DIAGNOSIS — R73.09 ABNORMAL GLUCOSE: ICD-10-CM

## 2025-07-29 DIAGNOSIS — Z13.6 CARDIOVASCULAR SCREENING; LDL GOAL LESS THAN 160: ICD-10-CM

## 2025-07-29 DIAGNOSIS — R39.9 URINARY SYMPTOM OR SIGN: ICD-10-CM

## 2025-07-29 DIAGNOSIS — M1A.9XX0 CHRONIC GOUT WITHOUT TOPHUS, UNSPECIFIED CAUSE, UNSPECIFIED SITE: ICD-10-CM

## 2025-07-29 DIAGNOSIS — I48.0 PAROXYSMAL ATRIAL FIBRILLATION (H): ICD-10-CM

## 2025-07-29 DIAGNOSIS — K21.9 GASTROESOPHAGEAL REFLUX DISEASE, UNSPECIFIED WHETHER ESOPHAGITIS PRESENT: ICD-10-CM

## 2025-07-29 DIAGNOSIS — Z00.00 ROUTINE HISTORY AND PHYSICAL EXAMINATION OF ADULT: Primary | ICD-10-CM

## 2025-07-29 DIAGNOSIS — Q23.81 BICUSPID AORTIC VALVE: ICD-10-CM

## 2025-07-29 DIAGNOSIS — Z12.5 SCREENING FOR PROSTATE CANCER: ICD-10-CM

## 2025-07-29 DIAGNOSIS — M10.9 ACUTE GOUTY ARTHRITIS: ICD-10-CM

## 2025-07-29 PROBLEM — F11.20 CONTINUOUS OPIOID DEPENDENCE (H): Status: ACTIVE | Noted: 2025-07-29

## 2025-07-29 LAB
ALBUMIN SERPL BCG-MCNC: 4.2 G/DL (ref 3.5–5.2)
ALBUMIN UR-MCNC: NEGATIVE MG/DL
ALP SERPL-CCNC: 54 U/L (ref 40–150)
ALT SERPL W P-5'-P-CCNC: 27 U/L (ref 0–70)
ANION GAP SERPL CALCULATED.3IONS-SCNC: 11 MMOL/L (ref 7–15)
APPEARANCE UR: CLEAR
AST SERPL W P-5'-P-CCNC: 34 U/L (ref 0–45)
BACTERIA #/AREA URNS HPF: ABNORMAL /HPF
BILIRUB SERPL-MCNC: 0.5 MG/DL
BILIRUB UR QL STRIP: NEGATIVE
BUN SERPL-MCNC: 16.9 MG/DL (ref 8–23)
CALCIUM SERPL-MCNC: 9.1 MG/DL (ref 8.8–10.4)
CHLORIDE SERPL-SCNC: 106 MMOL/L (ref 98–107)
CHOLEST SERPL-MCNC: 186 MG/DL
COLOR UR AUTO: YELLOW
CREAT SERPL-MCNC: 1 MG/DL (ref 0.67–1.17)
EGFRCR SERPLBLD CKD-EPI 2021: 86 ML/MIN/1.73M2
EST. AVERAGE GLUCOSE BLD GHB EST-MCNC: 100 MG/DL
FASTING STATUS PATIENT QL REPORTED: YES
FASTING STATUS PATIENT QL REPORTED: YES
GLUCOSE SERPL-MCNC: 94 MG/DL (ref 70–99)
GLUCOSE UR STRIP-MCNC: NEGATIVE MG/DL
HBA1C MFR BLD: 5.1 % (ref 0–5.6)
HCO3 SERPL-SCNC: 23 MMOL/L (ref 22–29)
HDLC SERPL-MCNC: 55 MG/DL
HGB UR QL STRIP: ABNORMAL
KETONES UR STRIP-MCNC: NEGATIVE MG/DL
LDLC SERPL CALC-MCNC: 111 MG/DL
LEUKOCYTE ESTERASE UR QL STRIP: NEGATIVE
NITRATE UR QL: NEGATIVE
NONHDLC SERPL-MCNC: 131 MG/DL
PH UR STRIP: 6 [PH] (ref 5–7)
POTASSIUM SERPL-SCNC: 4.4 MMOL/L (ref 3.4–5.3)
PROT SERPL-MCNC: 6.7 G/DL (ref 6.4–8.3)
PSA SERPL DL<=0.01 NG/ML-MCNC: 0.99 NG/ML (ref 0–4.5)
RBC #/AREA URNS AUTO: ABNORMAL /HPF
SODIUM SERPL-SCNC: 140 MMOL/L (ref 135–145)
SP GR UR STRIP: 1.02 (ref 1–1.03)
SQUAMOUS #/AREA URNS AUTO: ABNORMAL /LPF
TRIGL SERPL-MCNC: 101 MG/DL
URATE SERPL-MCNC: 4.8 MG/DL (ref 3.4–7)
UROBILINOGEN UR STRIP-ACNC: 0.2 E.U./DL
WBC #/AREA URNS AUTO: ABNORMAL /HPF

## 2025-07-29 PROCEDURE — 3049F LDL-C 100-129 MG/DL: CPT | Performed by: FAMILY MEDICINE

## 2025-07-29 PROCEDURE — 99396 PREV VISIT EST AGE 40-64: CPT | Performed by: FAMILY MEDICINE

## 2025-07-29 PROCEDURE — 99214 OFFICE O/P EST MOD 30 MIN: CPT | Mod: 25 | Performed by: FAMILY MEDICINE

## 2025-07-29 PROCEDURE — 81001 URINALYSIS AUTO W/SCOPE: CPT | Performed by: FAMILY MEDICINE

## 2025-07-29 PROCEDURE — G0103 PSA SCREENING: HCPCS | Performed by: FAMILY MEDICINE

## 2025-07-29 PROCEDURE — 3075F SYST BP GE 130 - 139MM HG: CPT | Performed by: FAMILY MEDICINE

## 2025-07-29 PROCEDURE — 3044F HG A1C LEVEL LT 7.0%: CPT | Performed by: FAMILY MEDICINE

## 2025-07-29 PROCEDURE — 84550 ASSAY OF BLOOD/URIC ACID: CPT | Performed by: FAMILY MEDICINE

## 2025-07-29 PROCEDURE — 83036 HEMOGLOBIN GLYCOSYLATED A1C: CPT | Performed by: FAMILY MEDICINE

## 2025-07-29 PROCEDURE — 80053 COMPREHEN METABOLIC PANEL: CPT | Performed by: FAMILY MEDICINE

## 2025-07-29 PROCEDURE — 36415 COLL VENOUS BLD VENIPUNCTURE: CPT | Performed by: FAMILY MEDICINE

## 2025-07-29 PROCEDURE — G2211 COMPLEX E/M VISIT ADD ON: HCPCS | Performed by: FAMILY MEDICINE

## 2025-07-29 PROCEDURE — 80061 LIPID PANEL: CPT | Performed by: FAMILY MEDICINE

## 2025-07-29 PROCEDURE — 1126F AMNT PAIN NOTED NONE PRSNT: CPT | Performed by: FAMILY MEDICINE

## 2025-07-29 PROCEDURE — 3079F DIAST BP 80-89 MM HG: CPT | Performed by: FAMILY MEDICINE

## 2025-07-29 RX ORDER — HYDROCODONE BITARTRATE AND ACETAMINOPHEN 5; 325 MG/1; MG/1
1 TABLET ORAL EVERY 6 HOURS PRN
Qty: 30 TABLET | Refills: 0 | Status: SHIPPED | OUTPATIENT
Start: 2025-07-29

## 2025-07-29 RX ORDER — ALLOPURINOL 300 MG/1
300 TABLET ORAL DAILY
Qty: 90 TABLET | Refills: 3 | Status: SHIPPED | OUTPATIENT
Start: 2025-07-29

## 2025-07-29 ASSESSMENT — PAIN SCALES - GENERAL: PAINLEVEL_OUTOF10: NO PAIN (0)

## 2025-07-29 NOTE — PROGRESS NOTES
Preventive Care Visit  [facility] HAILY Strigner MD, Family Medicine  Jul 29, 2025      Assessment & Plan     (Z00.00) Routine history and physical examination of adult  (primary encounter diagnosis)  Comment:  Reviewed the need for periodic healthcare exams and screenings.   Plan: REVIEW OF HEALTH MAINTENANCE PROTOCOL ORDERS        Advised yearly check ups.     (M1A.9XX0) Chronic gout without tophus, unspecified cause, unspecified site  Comment: Recent minor flare involving his toe.  With further history, he admits to taking just 1/2 tablet of his allopurinol.  Plan: Uric acid        Advised to maintain hydration, take a full tablet, 300 mg, of his allopurinol.    (I82.412) Acute deep vein thrombosis (DVT) of femoral vein of left lower extremity (H)  Comment: No reoccurrence, seen by hematology.  Advised lifelong anticoagulation.  Plan: apixaban ANTICOAGULANT (ELIQUIS) 5 MG tablet        Continue DOAC therapy.    (K21.9) Gastroesophageal reflux disease, unspecified whether esophagitis present  Comment: No increased symptoms, patient followed by GI.  Upcoming endoscopy in September. Normal liver and kidney function.   Plan: Comprehensive metabolic panel (BMP + Alb, Alk         Phos, ALT, AST, Total. Bili, TP)        Monitor.     (I48.0) Paroxysmal atrial fibrillation (H)  Comment: Clinically today appears to be in sinus rhythm.  Does note a history of irregular heartbeats, PVCs versus atrial fibrillation.  Today he is rate controlled, on appropriate anticoagulation.  Plan: Continue current management.    (R73.09) Abnormal glucose  Comment: A1c decreased.  No signs of insulin resistance.  Lab Results   Component Value Date    A1C 5.1 07/29/2025    A1C 5.3 06/21/2024    A1C 5.2 05/05/2023    A1C 5.3 03/18/2022    A1C 5.2 04/26/2017    A1C 5.3 08/12/2014      Plan: Hemoglobin A1c        Monitor.    (Z13.6) CARDIOVASCULAR SCREENING; LDL GOAL LESS THAN 160  Comment: acceptable lipid profile  without statin therapy.   Plan: Lipid panel reflex to direct LDL Fasting        Monitor.     (Z12.5) Screening for prostate cancer  Comment: normal.   Plan: PSA, screen        Repeat yearly until age 70    (R39.9) Urinary symptom or sign  Comment: UA normal, PSA normal. Etiology is unclear.   Plan: UA Macroscopic with reflex to Microscopic and         Culture - Lab Collect, UA Microscopic with         Reflex to Culture        Advised to monitor.     (Q23.81) Bicuspid aortic valve  Comment: No evidence of failure, will refer for repeat echocardiogram.  Plan: Echocardiogram Complete        Await test results     (M10.9) Acute gouty arthritis  Comment: Patient notes chronic bilateral foot pain, combination of decreased metatarsal fat pads plus gout.  Using some Norco.  Last prescription May 2025.  Plan: allopurinol (ZYLOPRIM) 300 MG tablet,         HYDROcodone-acetaminophen (NORCO) 5-325 MG         tablet        Refill given, advised that in the future, he may consider alternative therapy or limiting his usage.    Results for orders placed or performed in visit on 07/29/25   Uric acid     Status: Normal   Result Value Ref Range    Uric Acid 4.8 3.4 - 7.0 mg/dL   Lipid panel reflex to direct LDL Fasting     Status: Abnormal   Result Value Ref Range    Cholesterol 186 <200 mg/dL    Triglycerides 101 <150 mg/dL    Direct Measure HDL 55 >=40 mg/dL    LDL Cholesterol Calculated 111 (H) <100 mg/dL    Non HDL Cholesterol 131 (H) <130 mg/dL    Patient Fasting > 8hrs? Yes     Narrative    Cholesterol  Desirable: < 200 mg/dL  Borderline High: 200 - 239 mg/dL  High: >= 240 mg/dL    Triglycerides  Normal: < 150 mg/dL  Borderline High: 150 - 199 mg/dL  High: 200-499 mg/dL  Very High: >= 500 mg/dL    Direct Measure HDL  Female: >= 50 mg/dL   Male: >= 40 mg/dL    LDL Cholesterol  Desirable: < 100 mg/dL  Above Desirable: 100 - 129 mg/dL   Borderline High: 130 - 159 mg/dL   High:  160 - 189 mg/dL   Very High: >= 190 mg/dL    Non HDL  Cholesterol  Desirable: < 130 mg/dL  Above Desirable: 130 - 159 mg/dL  Borderline High: 160 - 189 mg/dL  High: 190 - 219 mg/dL  Very High: >= 220 mg/dL   Comprehensive metabolic panel (BMP + Alb, Alk Phos, ALT, AST, Total. Bili, TP)     Status: None   Result Value Ref Range    Sodium 140 135 - 145 mmol/L    Potassium 4.4 3.4 - 5.3 mmol/L    Carbon Dioxide (CO2) 23 22 - 29 mmol/L    Anion Gap 11 7 - 15 mmol/L    Urea Nitrogen 16.9 8.0 - 23.0 mg/dL    Creatinine 1.00 0.67 - 1.17 mg/dL    GFR Estimate 86 >60 mL/min/1.73m2    Calcium 9.1 8.8 - 10.4 mg/dL    Chloride 106 98 - 107 mmol/L    Glucose 94 70 - 99 mg/dL    Alkaline Phosphatase 54 40 - 150 U/L    AST 34 0 - 45 U/L    ALT 27 0 - 70 U/L    Protein Total 6.7 6.4 - 8.3 g/dL    Albumin 4.2 3.5 - 5.2 g/dL    Bilirubin Total 0.5 <=1.2 mg/dL    Patient Fasting > 8hrs? Yes    PSA, screen     Status: Normal   Result Value Ref Range    Prostate Specific Antigen Screen 0.99 0.00 - 4.50 ng/mL    Narrative    This result is obtained using the Roche Elecsys total PSA method on the tyrone e801 immunoassay analyzer, which is an ultrasensitive method. Results obtained with different assay methods or kits cannot be used interchangeably.  This test is intended for initial prostate cancer screening. PSA values exceeding the age-specific limits are suspicious for prostate disease, but additional testing, such as prostate biopsy, is needed to diagnose prostate pathology. The American Cancer Society recommends annual examination with digital rectal examination and serum PSA beginning at age 50 and for men with a life expectancy of at least 10 years after detection of prostate cancer. For men in high-risk groups, such as  Americans or men with a first-degree relative diagnosed at a younger age, testing should begin at a younger age. It is generally recommended that information be provided to patients about the benefits and limitations of testing and treatment so they can make  informed decisions.   UA Macroscopic with reflex to Microscopic and Culture - Lab Collect     Status: Abnormal    Specimen: Urine, Catheter   Result Value Ref Range    Color Urine Yellow Colorless, Straw, Light Yellow, Yellow    Appearance Urine Clear Clear    Glucose Urine Negative Negative mg/dL    Bilirubin Urine Negative Negative    Ketones Urine Negative Negative mg/dL    Specific Gravity Urine 1.025 1.003 - 1.035    Blood Urine Trace (A) Negative    pH Urine 6.0 5.0 - 7.0    Protein Albumin Urine Negative Negative mg/dL    Urobilinogen Urine 0.2 0.2, 1.0 E.U./dL    Nitrite Urine Negative Negative    Leukocyte Esterase Urine Negative Negative   Hemoglobin A1c     Status: Normal   Result Value Ref Range    Estimated Average Glucose 100 <117 mg/dL    Hemoglobin A1C 5.1 0.0 - 5.6 %   UA Microscopic with Reflex to Culture     Status: Abnormal   Result Value Ref Range    Bacteria Urine Few (A) None Seen /HPF    RBC Urine 2-5 (A) 0-2 /HPF /HPF    WBC Urine 0-5 0-5 /HPF /HPF    Squamous Epithelials Urine Few (A) None Seen /LPF    Narrative    Urine Culture not indicated        Patient Instructions   Work on quitting chewing.     Blood and urine tests today.     Call to schedule an echocardiogram: [phone].    Take a full tablet of the allopurinol.     I would recommend the covid and flu shots this fall.        Patient has been advised of split billing requirements and indicates understanding: Yes    BMI  Estimated body mass index is 25.36 kg/m  as calculated from the following:    Height as of this encounter: 1.829 m (6').    Weight as of this encounter: 84.8 kg (187 lb).       Counseling  Appropriate preventive services were addressed with this patient via screening, questionnaire, or discussion as appropriate for fall prevention, nutrition, physical activity, Tobacco-use cessation, social engagement, weight loss and cognition.  Checklist reviewing preventive services available has been given to the  patient.  Reviewed patient's diet, addressing concerns and/or questions.   He is at risk for lack of exercise and has been provided with information to increase physical activity for the benefit of his well-being.   The patient was instructed to see the dentist every 6 months.   The patient reports drinking more than 3 alcoholic drinks per day and/or more than 7 drhnks per week. The patient was counseled and given information about possible harmful effects of excessive alcohol intake.Reviewed preventive health counseling, as reflected in patient instructions       Regular exercise       Healthy diet/nutrition        Jessica Cuevas is a 59 year old, presenting for the following:  Physical        7/29/2025     6:51 AM   Additional Questions   Roomed by Jordyn Carvalho CMA   Accompanied by None         7/29/2025     6:51 AM   Patient Reported Additional Medications   Patient reports taking the following new medications None          HPI    Advance Care Planning    Discussed advance care planning with patient; however, patient declined at this time.        7/27/2025   General Health   How would you rate your overall physical health? Good   Feel stress (tense, anxious, or unable to sleep) Not at all         7/27/2025   Nutrition   Three or more servings of calcium each day? (!) NO   Diet: Regular (no restrictions)   How many servings of fruit and vegetables per day? (!) 0-1   How many sweetened beverages each day? 0-1         7/27/2025   Exercise   Days per week of moderate/strenous exercise 3 days   Average minutes spent exercising at this level 60 min         7/27/2025   Social Factors   Frequency of gathering with friends or relatives Twice a week   Worry food won't last until get money to buy more No   Food not last or not have enough money for food? No   Do you have housing? (Housing is defined as stable permanent housing and does not include staying outside in a car, in a tent, in an abandoned building, in an  overnight shelter, or couch-surfing.) Yes   Are you worried about losing your housing? No   Lack of transportation? No   Unable to get utilities (heat,electricity)? No         2025   Fall Risk   Fallen 2 or more times in the past year? No   Trouble with walking or balance? No          2025   Dental   Dentist two times every year? (!) NO           Today's PHQ-2 Score:       2/3/2025     9:54 AM   PHQ-2 (  [employer])   Q1: Little interest or pleasure in doing things 0   Q2: Feeling down, depressed or hopeless 0   PHQ-2 Score 0    Q1: Little interest or pleasure in doing things Not at all   Q2: Feeling down, depressed or hopeless Not at all   PHQ-2 Score 0       Patient-reported         2025   Substance Use   Alcohol more than 3/day or more than 7/wk Yes   How often do you have a drink containing alcohol 4 or more times a week   How many alcohol drinks on typical day 5 or 6   How often do you have 5+ drinks at one occasion Weekly   Audit 2/3 Score 5   How often not able to stop drinking once started Never   How often failed to do what normally expected Never   How often needed first drink in am after a heavy drinking session Never   How often feeling of guilt or remorse after drinking Less than monthly   How often unable to remember what happened the night before Never   Have you or someone else been injured because of your drinking No   Has anyone been concerned or suggested you cut down on drinking Yes, during the last year   TOTAL SCORE - AUDIT 14   Do you use any other substances recreationally? No     Social History     Tobacco Use    Smoking status: Every Day     Current packs/day: 0.00     Types: Other, Cigarettes     Last attempt to quit: 2007     Years since quittin.5     Passive exposure: Current    Smokeless tobacco: Current     Types: Chew   Vaping Use    Vaping status: Never Used   Substance Use Topics    Alcohol use: Yes     Comment: occasional    Drug use: No              7/27/2025   One time HIV Screening   Previous HIV test? No         7/27/2025   STI Screening   New sexual partner(s) since last STI/HIV test? No   Last PSA:   PSA   Date Value Ref Range Status   04/26/2017 0.77 0 - 4 ug/L Final     Comment:     Assay Method:  Chemiluminescence using Siemens Vista analyzer     Prostate Specific Antigen Screen   Date Value Ref Range Status   06/21/2024 1.09 0.00 - 4.50 ng/mL Final   03/18/2022 0.90 0.00 - 4.00 ug/L Final     ASCVD Risk   The 10-year ASCVD risk score (Connie VILLATORO, et al., 2019) is: 13.6%    Values used to calculate the score:      Age: 59 years      Sex: Male      Is Non- : No      Diabetic: No      Tobacco smoker: Yes      Systolic Blood Pressure: 130 mmHg      Is BP treated: No      HDL Cholesterol: 53 mg/dL      Total Cholesterol: 190 mg/dL           Reviewed and updated as needed this visit by Provider                    Lab work is in process  Labs reviewed in EPIC      Review of Systems  CONSTITUTIONAL: NEGATIVE for fever, chills, change in weight  ENT/MOUTH: NEGATIVE for ear, mouth and throat problems  RESP: NEGATIVE for significant cough or SOB  CV: NEGATIVE for chest pain, palpitations or peripheral edema  GI: NEGATIVE for nausea, abdominal pain, heartburn, or change in bowel habits  : Notes a left flank tingling or spasm.  MUSCULOSKELETAL: chronic foot pain.   PSYCHIATRIC: NEGATIVE for changes in mood or affect     Objective    Exam  /80   Pulse 68   Temp 97  F (36.1  C) (Temporal)   Resp 20   Ht 1.829 m (6')   Wt 84.8 kg (187 lb)   SpO2 100%   BMI 25.36 kg/m     Estimated body mass index is 25.36 kg/m  as calculated from the following:    Height as of this encounter: 1.829 m (6').    Weight as of this encounter: 84.8 kg (187 lb).    Physical Exam  GENERAL: Healthy, alert and no distress  EYES: Eyes grossly normal to inspection, conjunctivae and sclerae normal  RESP: Lungs clear to auscultation - no rales, rhonchi  or wheezes  CV: Regular rate and rhythm, normal S1 S2, no murmur  MS: No gross musculoskeletal defects noted, no edema  NEURO: Normal strength and tone, mentation intact and speech normal  PSYCH: Mentation appears normal, affect normal/bright     The longitudinal plan of care for the diagnosis(es)/condition(s) as documented were addressed during this visit. Due to the added complexity in care, I will continue to support Mason in the subsequent management and with ongoing continuity of care.         Signed Electronically by: Leah Stringer MD

## 2025-07-29 NOTE — PATIENT INSTRUCTIONS
Work on quitting chewing.     Blood and urine tests today.     Call to schedule an echocardiogram: [phone].    Take a full tablet of the allopurinol.     I would recommend the covid and flu shots this fall.

## 2025-08-14 ENCOUNTER — TELEPHONE (OUTPATIENT)
Dept: GASTROENTEROLOGY | Facility: CLINIC | Age: 60
End: 2025-08-14
Payer: COMMERCIAL

## 2025-09-02 ENCOUNTER — ANESTHESIA EVENT (OUTPATIENT)
Dept: SURGERY | Facility: AMBULATORY SURGERY CENTER | Age: 60
End: 2025-09-02
Payer: COMMERCIAL

## 2025-09-03 ENCOUNTER — ANESTHESIA (OUTPATIENT)
Dept: SURGERY | Facility: AMBULATORY SURGERY CENTER | Age: 60
End: 2025-09-03
Payer: COMMERCIAL

## 2025-09-03 ENCOUNTER — HOSPITAL ENCOUNTER (OUTPATIENT)
Facility: AMBULATORY SURGERY CENTER | Age: 60
Discharge: HOME | End: 2025-09-03
Attending: INTERNAL MEDICINE
Payer: COMMERCIAL

## 2025-09-03 VITALS
DIASTOLIC BLOOD PRESSURE: 65 MMHG | HEART RATE: 50 BPM | WEIGHT: 185 LBS | HEIGHT: 72 IN | TEMPERATURE: 97.3 F | SYSTOLIC BLOOD PRESSURE: 97 MMHG | BODY MASS INDEX: 25.06 KG/M2 | OXYGEN SATURATION: 99 % | RESPIRATION RATE: 18 BRPM

## 2025-09-03 VITALS — HEART RATE: 63 BPM

## 2025-09-03 DIAGNOSIS — K20.80 LOS ANGELES GRADE B ESOPHAGITIS: Primary | ICD-10-CM

## 2025-09-03 LAB — UPPER GI ENDOSCOPY: NORMAL

## 2025-09-03 PROCEDURE — 88305 TISSUE EXAM BY PATHOLOGIST: CPT | Mod: TC | Performed by: INTERNAL MEDICINE

## 2025-09-03 RX ORDER — PROPOFOL 10 MG/ML
INJECTION, EMULSION INTRAVENOUS PRN
Status: DISCONTINUED | OUTPATIENT
Start: 2025-09-03 | End: 2025-09-03

## 2025-09-03 RX ORDER — SODIUM CHLORIDE, SODIUM LACTATE, POTASSIUM CHLORIDE, CALCIUM CHLORIDE 600; 310; 30; 20 MG/100ML; MG/100ML; MG/100ML; MG/100ML
INJECTION, SOLUTION INTRAVENOUS CONTINUOUS
Status: DISCONTINUED | OUTPATIENT
Start: 2025-09-03 | End: 2025-09-03 | Stop reason: HOSPADM

## 2025-09-03 RX ORDER — ONDANSETRON 2 MG/ML
4 INJECTION INTRAMUSCULAR; INTRAVENOUS EVERY 6 HOURS PRN
Status: DISCONTINUED | OUTPATIENT
Start: 2025-09-03 | End: 2025-09-04 | Stop reason: HOSPADM

## 2025-09-03 RX ORDER — FLUMAZENIL 0.1 MG/ML
0.2 INJECTION, SOLUTION INTRAVENOUS
Status: ACTIVE | OUTPATIENT
Start: 2025-09-03 | End: 2025-09-03

## 2025-09-03 RX ORDER — PROCHLORPERAZINE MALEATE 10 MG
10 TABLET ORAL EVERY 6 HOURS PRN
Status: DISCONTINUED | OUTPATIENT
Start: 2025-09-03 | End: 2025-09-04 | Stop reason: HOSPADM

## 2025-09-03 RX ORDER — ONDANSETRON 4 MG/1
4 TABLET, ORALLY DISINTEGRATING ORAL EVERY 6 HOURS PRN
Status: DISCONTINUED | OUTPATIENT
Start: 2025-09-03 | End: 2025-09-04 | Stop reason: HOSPADM

## 2025-09-03 RX ORDER — NALOXONE HYDROCHLORIDE 0.4 MG/ML
0.4 INJECTION, SOLUTION INTRAMUSCULAR; INTRAVENOUS; SUBCUTANEOUS
Status: DISCONTINUED | OUTPATIENT
Start: 2025-09-03 | End: 2025-09-04 | Stop reason: HOSPADM

## 2025-09-03 RX ORDER — NALOXONE HYDROCHLORIDE 0.4 MG/ML
0.2 INJECTION, SOLUTION INTRAMUSCULAR; INTRAVENOUS; SUBCUTANEOUS
Status: DISCONTINUED | OUTPATIENT
Start: 2025-09-03 | End: 2025-09-04 | Stop reason: HOSPADM

## 2025-09-03 RX ORDER — LIDOCAINE HYDROCHLORIDE 20 MG/ML
INJECTION, SOLUTION INFILTRATION; PERINEURAL PRN
Status: DISCONTINUED | OUTPATIENT
Start: 2025-09-03 | End: 2025-09-03

## 2025-09-03 RX ORDER — SODIUM CHLORIDE, SODIUM LACTATE, POTASSIUM CHLORIDE, CALCIUM CHLORIDE 600; 310; 30; 20 MG/100ML; MG/100ML; MG/100ML; MG/100ML
INJECTION, SOLUTION INTRAVENOUS CONTINUOUS
Status: CANCELLED | OUTPATIENT
Start: 2025-09-03

## 2025-09-03 RX ORDER — ONDANSETRON 2 MG/ML
4 INJECTION INTRAMUSCULAR; INTRAVENOUS
Status: CANCELLED | OUTPATIENT
Start: 2025-09-03

## 2025-09-03 RX ORDER — GLYCOPYRROLATE 0.2 MG/ML
INJECTION, SOLUTION INTRAMUSCULAR; INTRAVENOUS PRN
Status: DISCONTINUED | OUTPATIENT
Start: 2025-09-03 | End: 2025-09-03

## 2025-09-03 RX ORDER — OMEPRAZOLE 40 MG/1
40 CAPSULE, DELAYED RELEASE ORAL DAILY
Qty: 90 CAPSULE | Refills: 3 | Status: SHIPPED | OUTPATIENT
Start: 2025-09-03 | End: 2026-09-03

## 2025-09-03 RX ORDER — PROPOFOL 10 MG/ML
INJECTION, EMULSION INTRAVENOUS CONTINUOUS PRN
Status: DISCONTINUED | OUTPATIENT
Start: 2025-09-03 | End: 2025-09-03

## 2025-09-03 RX ORDER — LIDOCAINE 40 MG/G
CREAM TOPICAL
Status: CANCELLED | OUTPATIENT
Start: 2025-09-03

## 2025-09-03 RX ADMIN — PROPOFOL 200 MCG/KG/MIN: 10 INJECTION, EMULSION INTRAVENOUS at 08:13

## 2025-09-03 RX ADMIN — LIDOCAINE HYDROCHLORIDE 40 MG: 20 INJECTION, SOLUTION INFILTRATION; PERINEURAL at 08:13

## 2025-09-03 RX ADMIN — SODIUM CHLORIDE, SODIUM LACTATE, POTASSIUM CHLORIDE, CALCIUM CHLORIDE: 600; 310; 30; 20 INJECTION, SOLUTION INTRAVENOUS at 07:25

## 2025-09-03 RX ADMIN — GLYCOPYRROLATE 0.1 MG: 0.2 INJECTION, SOLUTION INTRAMUSCULAR; INTRAVENOUS at 08:09

## 2025-09-03 RX ADMIN — PROPOFOL 70 MG: 10 INJECTION, EMULSION INTRAVENOUS at 08:12

## 2025-09-03 ASSESSMENT — ENCOUNTER SYMPTOMS: DYSRHYTHMIAS: 1

## 2025-09-03 ASSESSMENT — LIFESTYLE VARIABLES: TOBACCO_USE: 1

## 2025-09-04 LAB
PATH REPORT.COMMENTS IMP SPEC: NORMAL
PATH REPORT.FINAL DX SPEC: NORMAL
PATH REPORT.GROSS SPEC: NORMAL
PATH REPORT.MICROSCOPIC SPEC OTHER STN: NORMAL
PATH REPORT.RELEVANT HX SPEC: NORMAL
PHOTO IMAGE: NORMAL

## (undated) DEVICE — ENDO FORCEP ENDOJAW BIOPSY 2.8MMX230CM FB-220U

## (undated) DEVICE — GOWN ISOLATION YELLOW UNIV NOT STERILE 3110PG

## (undated) DEVICE — SPECIMEN CONTAINER 3OZ W/FORMALIN 59901

## (undated) DEVICE — ENDO FORCEP BX CAPTURA PRO SPIKE G50696

## (undated) DEVICE — TUBING SUCTION MEDI-VAC 1/4"X20' N620A

## (undated) DEVICE — KIT ENDO TURNOVER/PROCEDURE CARRY-ON 101822

## (undated) DEVICE — ENDO BITE BLOCK ADULT OMNI-BLOC

## (undated) DEVICE — GOWN IMPERVIOUS 2XL BLUE

## (undated) DEVICE — ENDO FORCEP ENDOJAW BIOPSY 3.7MMX230CM FB-222U

## (undated) DEVICE — SOL WATER IRRIG 500ML BOTTLE 2F7113

## (undated) DEVICE — SUCTION MANIFOLD NEPTUNE 2 SYS 1 PORT 702-025-000

## (undated) RX ORDER — PROPOFOL 10 MG/ML
INJECTION, EMULSION INTRAVENOUS
Status: DISPENSED
Start: 2024-07-22

## (undated) RX ORDER — SIMETHICONE 40MG/0.6ML
SUSPENSION, DROPS(FINAL DOSAGE FORM)(ML) ORAL
Status: DISPENSED
Start: 2024-07-22

## (undated) RX ORDER — LIDOCAINE HYDROCHLORIDE 10 MG/ML
INJECTION, SOLUTION EPIDURAL; INFILTRATION; INTRACAUDAL; PERINEURAL
Status: DISPENSED
Start: 2019-10-03

## (undated) RX ORDER — LIDOCAINE HYDROCHLORIDE 10 MG/ML
INJECTION, SOLUTION EPIDURAL; INFILTRATION; INTRACAUDAL; PERINEURAL
Status: DISPENSED
Start: 2022-06-30

## (undated) RX ORDER — PROPOFOL 10 MG/ML
INJECTION, EMULSION INTRAVENOUS
Status: DISPENSED
Start: 2022-05-16

## (undated) RX ORDER — PROPOFOL 10 MG/ML
INJECTION, EMULSION INTRAVENOUS
Status: DISPENSED
Start: 2019-10-03

## (undated) RX ORDER — LIDOCAINE HYDROCHLORIDE 10 MG/ML
INJECTION, SOLUTION EPIDURAL; INFILTRATION; INTRACAUDAL; PERINEURAL
Status: DISPENSED
Start: 2022-04-14